# Patient Record
Sex: MALE | Race: WHITE | NOT HISPANIC OR LATINO | Employment: OTHER | URBAN - NONMETROPOLITAN AREA
[De-identification: names, ages, dates, MRNs, and addresses within clinical notes are randomized per-mention and may not be internally consistent; named-entity substitution may affect disease eponyms.]

---

## 2017-01-25 ENCOUNTER — OFFICE VISIT (OUTPATIENT)
Dept: CARDIOLOGY | Facility: CLINIC | Age: 62
End: 2017-01-25

## 2017-01-25 VITALS
WEIGHT: 211.2 LBS | HEART RATE: 67 BPM | DIASTOLIC BLOOD PRESSURE: 76 MMHG | HEIGHT: 69 IN | BODY MASS INDEX: 31.28 KG/M2 | SYSTOLIC BLOOD PRESSURE: 115 MMHG | OXYGEN SATURATION: 97 %

## 2017-01-25 DIAGNOSIS — Z85.819 HISTORY OF THROAT CANCER: ICD-10-CM

## 2017-01-25 DIAGNOSIS — Z00.00 HEALTHCARE MAINTENANCE: ICD-10-CM

## 2017-01-25 DIAGNOSIS — I25.10 CORONARY ARTERY DISEASE INVOLVING NATIVE CORONARY ARTERY OF NATIVE HEART WITHOUT ANGINA PECTORIS: ICD-10-CM

## 2017-01-25 DIAGNOSIS — E78.5 DYSLIPIDEMIA: Chronic | ICD-10-CM

## 2017-01-25 DIAGNOSIS — F17.200 CURRENT EVERY DAY SMOKER: Chronic | ICD-10-CM

## 2017-01-25 DIAGNOSIS — I25.5 ISCHEMIC CARDIOMYOPATHY: Primary | Chronic | ICD-10-CM

## 2017-01-25 DIAGNOSIS — I10 ESSENTIAL HYPERTENSION: Chronic | ICD-10-CM

## 2017-01-25 DIAGNOSIS — R49.9 CHANGE IN VOICE: ICD-10-CM

## 2017-01-25 PROCEDURE — 99213 OFFICE O/P EST LOW 20 MIN: CPT | Performed by: NURSE PRACTITIONER

## 2017-01-25 NOTE — PROGRESS NOTES
Subjective   Darnell Abarca is a 61 y.o. male     Chief Complaint   Patient presents with   • Follow-up     presents as a follow up       HPI    Problem List:    1. CAD  1.1 CABG @ [facility] distant past  1.2 C 6/2015 - patient grafts with disease in the native vessels with medical management recommendation; EF 40%  2. Ischemic Cardiomyopathy   2.1 Echo 8/1/2014 - EF 50-55%; DD I; mild LVH; mild TR; trace NM; PA 30-35  3. CHF; class II-III symptoms   4. Dyslipidemia   5. Orthostatic hypotension  6. COPD  7. History of throat CA    Patient is a 61-year-old male who presents today for a follow-up with his girlfriend at his side.  He denies any chest pain, pressure, palpitations, fluttering, dizziness, presyncope, syncope, orthopnea, PND or edema. He says he only gets short of breath with activity at times.  He has not had a follow-up on his throat CA in some time.  He still smokes 1 1/2 PPD.      Current Outpatient Prescriptions   Medication Sig Dispense Refill   • albuterol (VENTOLIN HFA) 108 (90 BASE) MCG/ACT inhaler Inhale 1 puff every 4 (four) hours as needed (shortness of breath). 1 inhaler 6   • atorvastatin (LIPITOR) 80 MG tablet Take 1 tablet by mouth every night. 90 tablet 3   • clopidogrel (PLAVIX) 75 MG tablet Take 1 tablet by mouth daily. 90 tablet 3   • gabapentin (NEURONTIN) 100 MG capsule Take 1 capsule by mouth every night.     • lisinopril (PRINIVIL,ZESTRIL) 10 MG tablet Take 1 tablet by mouth daily. 90 tablet 3   • nitroglycerin (NITROSTAT) 0.4 MG SL tablet Place  under the tongue.     • omeprazole (PRILOSEC) 20 MG capsule Take 1 capsule by mouth daily.     • oxyCODONE-acetaminophen (PERCOCET)  MG per tablet Take  by mouth.       No current facility-administered medications for this visit.        ALLERGIES    Aspirin; Codeine; Ethanolamine; Nsaids; Other; Salicylates; Theophylline; and Triprolidine-pseudoephedrine    Past Medical History   Diagnosis Date   • CAD (coronary artery disease)    • COPD  (chronic obstructive pulmonary disease)    • Current every day smoker      1.5-2 PPD   • Dizziness    • Dyslipidemia    • Edema    • History of throat cancer    • Hyperlipidemia    • Hypertension    • Ischemic cardiomyopathy    • Lightheaded    • Myocardial infarction    • Orthostatic hypotension    • SOB (shortness of breath)    • Stroke        Social History     Social History   • Marital status:      Spouse name: N/A   • Number of children: N/A   • Years of education: N/A     Occupational History   • Not on file.     Social History Main Topics   • Smoking status: Current Every Day Smoker     Packs/day: 2.00   • Smokeless tobacco: Not on file      Comment: 1.5-2 PPD   • Alcohol use Yes      Comment: occasionally   • Drug use: No   • Sexual activity: Not on file     Other Topics Concern   • Not on file     Social History Narrative       Family History   Problem Relation Age of Onset   • Diabetes Mother    • Hypertension Mother    • Hypertension Father    • Hyperlipidemia Sister      Familial hypercholesterolemia   • Hypertension Sister    • Hyperlipidemia Brother      Familial hypercholesterolemia   • Heart attack Brother      Acute MI   • Stroke Brother      CVA   • Hypertension Brother    • Heart disease Brother      pacemaker placement   • Other Brother      Pacemaker placement       Review of Systems   Constitutional: Positive for fatigue.   HENT: Positive for voice change (2003 or 2004 throat CA surgery voice has never fully recovered. ). Negative for rhinorrhea and sneezing.    Eyes: Positive for visual disturbance (wears glasses ).   Respiratory: Positive for cough (recent cold; used white lightening to resolve it ) and shortness of breath (not too bad ). Negative for chest tightness.    Cardiovascular: Negative for chest pain and leg swelling (neurontin helped the swelling).   Gastrointestinal: Positive for nausea and vomiting.   Endocrine: Negative.    Genitourinary: Negative for difficulty  "urinating.   Musculoskeletal: Positive for arthralgias, back pain and neck pain.        Leg cramps some times   Skin: Negative.    Allergic/Immunologic: Negative.    Neurological: Negative for dizziness, syncope and light-headedness.   Hematological: Bruises/bleeds easily.   Psychiatric/Behavioral: The patient is nervous/anxious.        Objective   Visit Vitals   • /76 (BP Location: Left arm, Patient Position: Sitting)   • Pulse 67   • Ht 69\" (175.3 cm)   • Wt 211 lb 3.2 oz (95.8 kg)   • SpO2 97%   • BMI 31.19 kg/m2     Lab Results (most recent)     None        Physical Exam   Constitutional: He is oriented to person, place, and time. Vital signs are normal. He appears well-developed and well-nourished. He is active and cooperative.   HENT:   Head: Normocephalic.   Right Ear: Decreased hearing is noted.   Left Ear: Decreased hearing is noted.   Mouth/Throat: Abnormal dentition (missing teeth ).   Hoarse voice; post nasal drip.    Eyes: Lids are normal.   Wears glasses    Neck: Normal carotid pulses, no hepatojugular reflux and no JVD present. Carotid bruit is not present.   Cardiovascular: Normal rate, regular rhythm and normal heart sounds.    Pulses:       Radial pulses are 2+ on the right side, and 2+ on the left side.        Dorsalis pedis pulses are 2+ on the right side, and 2+ on the left side.        Posterior tibial pulses are 2+ on the right side, and 2+ on the left side.   No edema BLE.    Pulmonary/Chest: Effort normal. He has wheezes (expiratory ) in the right upper field, the right middle field, the right lower field, the left upper field, the left middle field and the left lower field.   Abdominal: Normal appearance.   Neurological: He is alert and oriented to person, place, and time.   Skin: Skin is warm, dry and intact. There is pallor (to yellowish color).   Psychiatric: He has a normal mood and affect. His speech is normal and behavior is normal. Judgment and thought content normal. Cognition " and memory are normal.       Procedure   Procedures         Assessment/Plan      Diagnosis Plan   1. Ischemic cardiomyopathy  TSH   2. Essential hypertension  Comprehensive Metabolic Panel    TSH   3. Dyslipidemia  Lipid Panel    TSH   4. Current every day smoker     5. Coronary artery disease involving native coronary artery of native heart without angina pectoris  TSH    CBC & Differential   6. History of throat cancer  Ambulatory Referral to ENT (Otolaryngology)   7. Change in voice  Ambulatory Referral to ENT (Otolaryngology)   8. Healthcare maintenance  Comprehensive Metabolic Panel    Lipid Panel    TSH    CBC & Differential       Return in about 3 months (around 4/25/2017).  Patient is stable from a cardiac standpoint.  He will continue his medication regimen.  He will be referred Dr. Narvaez.  He will get lipids, CMP, TSH and CBC.  He will follow-up in 3 mos or sooner if any changes.  He was encouraged on smoking cessation.

## 2017-01-25 NOTE — PATIENT INSTRUCTIONS
Heart Failure  Heart failure is a condition in which the heart has trouble pumping blood. This means your heart does not pump blood efficiently for your body to work well. In some cases of heart failure, fluid may back up into your lungs or you may have swelling (edema) in your lower legs. Heart failure is usually a long-term (chronic) condition. It is important for you to take good care of yourself and follow your health care provider's treatment plan.  CAUSES   Some health conditions can cause heart failure. Those health conditions include:  · High blood pressure (hypertension). Hypertension causes the heart muscle to work harder than normal. When pressure in the blood vessels is high, the heart needs to pump (contract) with more force in order to circulate blood throughout the body. High blood pressure eventually causes the heart to become stiff and weak.  · Coronary artery disease (CAD). CAD is the buildup of cholesterol and fat (plaque) in the arteries of the heart. The blockage in the arteries deprives the heart muscle of oxygen and blood. This can cause chest pain and may lead to a heart attack. High blood pressure can also contribute to CAD.  · Heart attack (myocardial infarction). A heart attack occurs when one or more arteries in the heart become blocked. The loss of oxygen damages the muscle tissue of the heart. When this happens, part of the heart muscle dies. The injured tissue does not contract as well and weakens the heart's ability to pump blood.  · Abnormal heart valves. When the heart valves do not open and close properly, it can cause heart failure. This makes the heart muscle pump harder to keep the blood flowing.  · Heart muscle disease (cardiomyopathy or myocarditis). Heart muscle disease is damage to the heart muscle from a variety of causes. These can include drug or alcohol abuse, infections, or unknown reasons. These can increase the risk of heart failure.  · Lung disease. Lung disease  makes the heart work harder because the lungs do not work properly. This can cause a strain on the heart, leading it to fail.  · Diabetes. Diabetes increases the risk of heart failure. High blood sugar contributes to high fat (lipid) levels in the blood. Diabetes can also cause slow damage to tiny blood vessels that carry important nutrients to the heart muscle. When the heart does not get enough oxygen and food, it can cause the heart to become weak and stiff. This leads to a heart that does not contract efficiently.  · Other conditions can contribute to heart failure. These include abnormal heart rhythms, thyroid problems, and low blood counts (anemia).  Certain unhealthy behaviors can increase the risk of heart failure, including:  · Being overweight.  · Smoking or chewing tobacco.  · Eating foods high in fat and cholesterol.  · Abusing illicit drugs or alcohol.  · Lacking physical activity.  SYMPTOMS   Heart failure symptoms may vary and can be hard to detect. Symptoms may include:  · Shortness of breath with activity, such as climbing stairs.  · Persistent cough.  · Swelling of the feet, ankles, legs, or abdomen.  · Unexplained weight gain.  · Difficulty breathing when lying flat (orthopnea).  · Waking from sleep because of the need to sit up and get more air.  · Rapid heartbeat.  · Fatigue and loss of energy.  · Feeling light-headed, dizzy, or close to fainting.  · Loss of appetite.  · Nausea.  · Increased urination during the night (nocturia).  DIAGNOSIS   A diagnosis of heart failure is based on your history, symptoms, physical examination, and diagnostic tests. Diagnostic tests for heart failure may include:  · Echocardiography.  · Electrocardiography.  · Chest X-ray.  · Blood tests.  · Exercise stress test.  · Cardiac angiography.  · Radionuclide scans.  TREATMENT   Treatment is aimed at managing the symptoms of heart failure. Medicines, behavioral changes, or surgical intervention may be necessary to  treat heart failure.  · Medicines to help treat heart failure may include:    Angiotensin-converting enzyme (ACE) inhibitors. This type of medicine blocks the effects of a blood protein called angiotensin-converting enzyme. ACE inhibitors relax (dilate) the blood vessels and help lower blood pressure.    Angiotensin receptor blockers (ARBs). This type of medicine blocks the actions of a blood protein called angiotensin. Angiotensin receptor blockers dilate the blood vessels and help lower blood pressure.    Water pills (diuretics). Diuretics cause the kidneys to remove salt and water from the blood. The extra fluid is removed through urination. This loss of extra fluid lowers the volume of blood the heart pumps.    Beta blockers. These prevent the heart from beating too fast and improve heart muscle strength.    Digitalis. This increases the force of the heartbeat.  · Healthy behavior changes include:    Obtaining and maintaining a healthy weight.    Stopping smoking or chewing tobacco.    Eating heart-healthy foods.    Limiting or avoiding alcohol.    Stopping illicit drug use.    Physical activity as directed by your health care provider.  · Surgical treatment for heart failure may include:    A procedure to open blocked arteries, repair damaged heart valves, or remove damaged heart muscle tissue.    A pacemaker to improve heart muscle function and control certain abnormal heart rhythms.    An internal cardioverter defibrillator to treat certain serious abnormal heart rhythms.    A left ventricular assist device (LVAD) to assist the pumping ability of the heart.  HOME CARE INSTRUCTIONS   · Take medicines only as directed by your health care provider. Medicines are important in reducing the workload of your heart, slowing the progression of heart failure, and improving your symptoms.    Do not stop taking your medicine unless directed by your health care provider.    Do not skip any dose of medicine.    Refill your  prescriptions before you run out of medicine. Your medicines are needed every day.  · Engage in moderate physical activity if directed by your health care provider. Moderate physical activity can benefit some people. The elderly and people with severe heart failure should consult with a health care provider for physical activity recommendations.  · Eat heart-healthy foods. Food choices should be free of trans fat and low in saturated fat, cholesterol, and salt (sodium). Healthy choices include fresh or frozen fruits and vegetables, fish, lean meats, legumes, fat-free or low-fat dairy products, and whole grain or high fiber foods. Talk to a dietitian to learn more about heart-healthy foods.  · Limit sodium if directed by your health care provider. Sodium restriction may reduce symptoms of heart failure in some people. Talk to a dietitian to learn more about heart-healthy seasonings.  · Use healthy cooking methods. Healthy cooking methods include roasting, grilling, broiling, baking, poaching, steaming, or stir-frying. Talk to a dietitian to learn more about healthy cooking methods.  · Limit fluids if directed by your health care provider. Fluid restriction may reduce symptoms of heart failure in some people.  · Weigh yourself every day. Daily weights are important in the early recognition of excess fluid. You should weigh yourself every morning after you urinate and before you eat breakfast. Wear the same amount of clothing each time you weigh yourself. Record your daily weight. Provide your health care provider with your weight record.  · Monitor and record your blood pressure if directed by your health care provider.  · Check your pulse if directed by your health care provider.  · Lose weight if directed by your health care provider. Weight loss may reduce symptoms of heart failure in some people.  · Stop smoking or chewing tobacco. Nicotine makes your heart work harder by causing your blood vessels to constrict.  Do not use nicotine gum or patches before talking to your health care provider.  · Keep all follow-up visits as directed by your health care provider. This is important.  · Limit alcohol intake to no more than 1 drink per day for nonpregnant women and 2 drinks per day for men. One drink equals 12 ounces of beer, 5 ounces of wine, or 1½ ounces of hard liquor. Drinking more than that is harmful to your heart. Tell your health care provider if you drink alcohol several times a week. Talk with your health care provider about whether alcohol is safe for you. If your heart has already been damaged by alcohol or you have severe heart failure, drinking alcohol should be stopped completely.  · Stop illicit drug use.  · Stay up-to-date with immunizations. It is especially important to prevent respiratory infections through current pneumococcal and influenza immunizations.  · Manage other health conditions such as hypertension, diabetes, thyroid disease, or abnormal heart rhythms as directed by your health care provider.  · Learn to manage stress.  · Plan rest periods when fatigued.  · Learn strategies to manage high temperatures. If the weather is extremely hot:    Avoid vigorous physical activity.    Use air conditioning or fans or seek a cooler location.    Avoid caffeine and alcohol.    Wear loose-fitting, lightweight, and light-colored clothing.  · Learn strategies to manage cold temperatures. If the weather is extremely cold:    Avoid vigorous physical activity.    Layer clothes.    Wear mittens or gloves, a hat, and a scarf when going outside.    Avoid alcohol.  · Obtain ongoing education and support as needed.  · Participate in or seek rehabilitation as needed to maintain or improve independence and quality of life.  SEEK MEDICAL CARE IF:   · You have a rapid weight gain.  · You have increasing shortness of breath that is unusual for you.  · You are unable to participate in your usual physical activities.  · You tire  easily.  · You cough more than normal, especially with physical activity.  · You have any or more swelling in areas such as your hands, feet, ankles, or abdomen.  · You are unable to sleep because it is hard to breathe.  · You feel like your heart is beating fast (palpitations).  · You become dizzy or light-headed upon standing up.  SEEK IMMEDIATE MEDICAL CARE IF:   · You have difficulty breathing.  · There is a change in mental status such as decreased alertness or difficulty with concentration.  · You have a pain or discomfort in your chest.  · You have an episode of fainting (syncope).  MAKE SURE YOU:   · Understand these instructions.  · Will watch your condition.  · Will get help right away if you are not doing well or get worse.     This information is not intended to replace advice given to you by your health care provider. Make sure you discuss any questions you have with your health care provider.     Document Released: 12/18/2006 Document Revised: 05/03/2016 Document Reviewed: 01/17/2014  [employer] Interactive Patient Education ©2016 [employer]  Coronary Artery Disease, Male  Coronary artery disease (CAD) is a process in which the blood vessels of the heart (coronary arteries) become narrow or blocked. The narrowing or blockage can lead to decreased blood flow to the heart muscle (angina). Prolonged reduced blood flow can cause a heart attack (myocardial infarction, MI). Because CAD is the leading cause of death in men, it is important to understand what causes this condition and how it is treated.  CAUSES  Atherosclerosis is the cause of CAD. This is the buildup of fat and cholesterol (plaque) on the inside of the arteries. Over time, the plaque may narrow or block the artery, and this will lessen blood flow to the heart. Plaque can also become weak and break off within a coronary artery to form a clot and cause a sudden blockage.  RISK FACTORS  Many risk factors increase your chances of getting CAD,  including:  · High cholesterol levels.  · High blood pressure (hypertension).  · Tobacco use.  · Diabetes.  · Age. Men over age 45 are at a greater risk of CAD.  · Gender. Men often develop CAD earlier in life than women.  · Family history of CAD.  · Obesity.  · Lack of exercise.  · A diet high in saturated fats.  SYMPTOMS   Many people do not experience any symptoms during the early stages of CAD. As the condition progresses, symptoms may include:   · Chest pain.  ¨ The pain can be described as a crushing or squeezing in the chest, or a tightness, pressure, fullness, or heaviness in the chest.  ¨ The pain can last more than a few minutes or can stop and recur.   · Pain in the arms, neck, jaw, or back.  · Unexplained heartburn or indigestion.  · Shortness of breath.  · Nausea.  · Sudden cold sweats.   Less common symptoms of CAD in men can include:  · Fatigue.  · Unexplained feelings of nervousness or anxiety.  · Weakness.  · Diarrhea.  · Sudden light-headedness.  DIAGNOSIS   Tests to diagnose CAD may include:  · ECG (electrocardiogram).  · Exercise stress test. This looks for signs of blockage when the heart is being exercised.  · Pharmacologic stress test. This test looks for signs of blockage when the heart is being stressed with a medicine.  · Blood tests.  · Coronary angiogram. This is a procedure to look at the coronary arteries to see if there is any blockage.  TREATMENT  The treatment of CAD may include the following:  · Healthy behavioral changes to reduce or control risk factors.  · Medicine.  · Coronary stenting. A stent helps to keep an artery open.  · Coronary angioplasty. This procedure widens a narrowed or blocked artery.  · Coronary artery bypass surgery. This will allow your blood to pass the blockage (bypass) to reach your heart.  HOME CARE INSTRUCTIONS  · Take medicines only as directed by your health care provider.  · Do not take the following medicines unless your health care provider  approves:    Nonsteroidal anti-inflammatory drugs (NSAIDs), such as ibuprofen, naproxen, or celecoxib.    Vitamin supplements that contain vitamin A, vitamin E, or both.  · Manage other health conditions such as hypertension and diabetes as directed by your health care provider.  · Follow a heart-healthy diet. A dietitian can help to educate you about healthy food options and changes.  · Use healthy cooking methods such as roasting, grilling, broiling, baking, poaching, steaming, or stir-frying. Talk to a dietitian to learn more about healthy cooking methods.  · Follow an exercise program approved by your health care provider.  · Maintain a healthy weight. Lose weight as approved by your health care provider.  · Plan rest periods when fatigued.  · Learn to manage stress.  · Do not use any tobacco products, including cigarettes, chewing tobacco, or electronic cigarettes. If you need help quitting, ask your health care provider.  · If you drink alcohol, and your health care provider approves, limit your alcohol intake to no more than 1 drink per day. One drink equals 12 ounces of beer, 5 ounces of wine, or 1½ ounces of hard liquor.  · Stop illegal drug use.  · Your health care provider may ask you to monitor your blood pressure. A blood pressure reading consists of a higher number over a lower number, such as 110 over 72, which is written as 110/72. Ideally, your blood pressure should be:    Below 140/90 if you have no other medical conditions.    Below 130/80 if you have diabetes or kidney disease.  · Keep all follow-up visits as directed by your health care provider. This is important.  SEEK IMMEDIATE MEDICAL CARE IF:  · You have pain in your chest, neck, arm, jaw, stomach, or back that lasts more than a few minutes, is recurring, or is unrelieved by taking medicine under your tongue (sublingual nitroglycerin).  · You have profuse sweating without cause.  · You have unexplained:    Heartburn or indigestion.    " Shortness of breath or difficulty breathing.    Nausea or vomiting.    Fatigue.    Feelings of nervousness or anxiety.    Weakness.    Diarrhea.  · You have sudden light-headedness or dizziness.  · You faint.  These symptoms may represent a serious problem that is an emergency. Do not wait to see if the symptoms will go away. Get medical help right away. Call your local emergency services (911 in the [place] Do not drive yourself to the hospital.     This information is not intended to replace advice given to you by your health care provider. Make sure you discuss any questions you have with your health care provider.     Document Released: 07/15/2015 Document Reviewed: 07/15/2015  [employer] Interactive Patient Education ©2016 [employer]  You Can Quit Smoking  If you are ready to quit smoking or are thinking about it, congratulations! You have chosen to help yourself be healthier and live longer! There are lots of different ways to quit smoking. Nicotine gum, nicotine patches, a nicotine inhaler, or nicotine nasal spray can help with physical craving. Hypnosis, support groups, and medicines help break the habit of smoking.  TIPS TO GET OFF AND STAY OFF CIGARETTES  · Learn to predict your moods. Do not let a bad situation be your excuse to have a cigarette. Some situations in your life might tempt you to have a cigarette.  · Ask friends and co-workers not to smoke around you.  · Make your home smoke-free.  · Never have \"just one\" cigarette. It leads to wanting another and another. Remind yourself of your decision to quit.  · On a card, make a list of your reasons for not smoking. Read it at least the same number of times a day as you have a cigarette. Tell yourself everyday, \"I do not want to smoke. I choose not to smoke.\"  · Ask someone at home or work to help you with your plan to quit smoking.  · Have something planned after you eat or have a cup of coffee. Take a walk or get other exercise to perk you up. This " "will help to keep you from overeating.  · Try a relaxation exercise to calm you down and decrease your stress. Remember, you may be tense and nervous the first two weeks after you quit. This will pass.  · Find new activities to keep your hands busy. Play with a pen, coin, or rubber band. Doodle or draw things on paper.  · Brush your teeth right after eating. This will help cut down the craving for the taste of tobacco after meals. You can try mouthwash too.  · Try gum, breath mints, or diet candy to keep something in your mouth.  IF YOU SMOKE AND WANT TO QUIT:  · Do not stock up on cigarettes. Never buy a carton. Wait until one pack is finished before you buy another.  · Never carry cigarettes with you at work or at home.  · Keep cigarettes as far away from you as possible. Leave them with someone else.  · Never carry matches or a lighter with you.  · Ask yourself, \"Do I need this cigarette or is this just a reflex?\"  · Bet with someone that you can quit. Put cigarette money in a [employer] bank every morning. If you smoke, you give up the money. If you do not smoke, by the end of the week, you keep the money.  · Keep trying. It takes 21 days to change a habit!  · Talk to your doctor about using medicines to help you quit. These include nicotine replacement gum, lozenges, or skin patches.     This information is not intended to replace advice given to you by your health care provider. Make sure you discuss any questions you have with your health care provider.     Document Released: 10/14/2010 Document Revised: 03/11/2013 Document Reviewed: 10/14/2010  [employer] Interactive Patient Education ©2016 [employer]    "

## 2017-01-25 NOTE — MR AVS SNAPSHOT
Darnell Abarca   1/25/2017 2:15 PM   Office Visit    Dept Phone:  [phone]   Encounter #:  [id number]    Provider:  EARLINE Armenta   Department:  [facility] CARDIOLOGY                Your Full Care Plan              Your Updated Medication List          This list is accurate as of: 1/25/17  3:08 PM.  Always use your most recent med list.                albuterol 108 (90 BASE) MCG/ACT inhaler   Commonly known as:  VENTOLIN HFA   Inhale 1 puff every 4 (four) hours as needed (shortness of breath).       atorvastatin 80 MG tablet   Commonly known as:  LIPITOR   Take 1 tablet by mouth every night.       clopidogrel 75 MG tablet   Commonly known as:  PLAVIX   Take 1 tablet by mouth daily.       gabapentin 100 MG capsule   Commonly known as:  NEURONTIN       lisinopril 10 MG tablet   Commonly known as:  PRINIVIL,ZESTRIL   Take 1 tablet by mouth daily.       NITROSTAT 0.4 MG SL tablet   Generic drug:  nitroglycerin       PERCOCET  MG per tablet   Generic drug:  oxyCODONE-acetaminophen       PRILOSEC 20 MG capsule   Generic drug:  omeprazole               We Performed the Following     Ambulatory Referral to ENT (Otolaryngology)       You Were Diagnosed With        Codes Comments    Ischemic cardiomyopathy    -  Primary ICD-10-CM: I25.5  ICD-9-CM: 414.8     Essential hypertension     ICD-10-CM: I10  ICD-9-CM: 401.9     Dyslipidemia     ICD-10-CM: E78.5  ICD-9-CM: 272.4     Current every day smoker     ICD-10-CM: F17.200  ICD-9-CM: 305.1     Coronary artery disease involving native coronary artery of native heart without angina pectoris     ICD-10-CM: I25.10  ICD-9-CM: 414.01     History of throat cancer     ICD-10-CM: Z85.819  ICD-9-CM: V10.02     Change in voice     ICD-10-CM: R49.9  ICD-9-CM: 784.49     Healthcare maintenance     ICD-10-CM: Z00.00  ICD-9-CM: V70.0       Instructions    Heart Failure  Heart failure is a condition in which the heart has trouble  pumping blood. This means your heart does not pump blood efficiently for your body to work well. In some cases of heart failure, fluid may back up into your lungs or you may have swelling (edema) in your lower legs. Heart failure is usually a long-term (chronic) condition. It is important for you to take good care of yourself and follow your health care provider's treatment plan.  CAUSES   Some health conditions can cause heart failure. Those health conditions include:  · High blood pressure (hypertension). Hypertension causes the heart muscle to work harder than normal. When pressure in the blood vessels is high, the heart needs to pump (contract) with more force in order to circulate blood throughout the body. High blood pressure eventually causes the heart to become stiff and weak.  · Coronary artery disease (CAD). CAD is the buildup of cholesterol and fat (plaque) in the arteries of the heart. The blockage in the arteries deprives the heart muscle of oxygen and blood. This can cause chest pain and may lead to a heart attack. High blood pressure can also contribute to CAD.  · Heart attack (myocardial infarction). A heart attack occurs when one or more arteries in the heart become blocked. The loss of oxygen damages the muscle tissue of the heart. When this happens, part of the heart muscle dies. The injured tissue does not contract as well and weakens the heart's ability to pump blood.  · Abnormal heart valves. When the heart valves do not open and close properly, it can cause heart failure. This makes the heart muscle pump harder to keep the blood flowing.  · Heart muscle disease (cardiomyopathy or myocarditis). Heart muscle disease is damage to the heart muscle from a variety of causes. These can include drug or alcohol abuse, infections, or unknown reasons. These can increase the risk of heart failure.  · Lung disease. Lung disease makes the heart work harder because the lungs do not work properly. This can  cause a strain on the heart, leading it to fail.  · Diabetes. Diabetes increases the risk of heart failure. High blood sugar contributes to high fat (lipid) levels in the blood. Diabetes can also cause slow damage to tiny blood vessels that carry important nutrients to the heart muscle. When the heart does not get enough oxygen and food, it can cause the heart to become weak and stiff. This leads to a heart that does not contract efficiently.  · Other conditions can contribute to heart failure. These include abnormal heart rhythms, thyroid problems, and low blood counts (anemia).  Certain unhealthy behaviors can increase the risk of heart failure, including:  · Being overweight.  · Smoking or chewing tobacco.  · Eating foods high in fat and cholesterol.  · Abusing illicit drugs or alcohol.  · Lacking physical activity.  SYMPTOMS   Heart failure symptoms may vary and can be hard to detect. Symptoms may include:  · Shortness of breath with activity, such as climbing stairs.  · Persistent cough.  · Swelling of the feet, ankles, legs, or abdomen.  · Unexplained weight gain.  · Difficulty breathing when lying flat (orthopnea).  · Waking from sleep because of the need to sit up and get more air.  · Rapid heartbeat.  · Fatigue and loss of energy.  · Feeling light-headed, dizzy, or close to fainting.  · Loss of appetite.  · Nausea.  · Increased urination during the night (nocturia).  DIAGNOSIS   A diagnosis of heart failure is based on your history, symptoms, physical examination, and diagnostic tests. Diagnostic tests for heart failure may include:  · Echocardiography.  · Electrocardiography.  · Chest X-ray.  · Blood tests.  · Exercise stress test.  · Cardiac angiography.  · Radionuclide scans.  TREATMENT   Treatment is aimed at managing the symptoms of heart failure. Medicines, behavioral changes, or surgical intervention may be necessary to treat heart failure.  · Medicines to help treat heart failure may include:     Angiotensin-converting enzyme (ACE) inhibitors. This type of medicine blocks the effects of a blood protein called angiotensin-converting enzyme. ACE inhibitors relax (dilate) the blood vessels and help lower blood pressure.    Angiotensin receptor blockers (ARBs). This type of medicine blocks the actions of a blood protein called angiotensin. Angiotensin receptor blockers dilate the blood vessels and help lower blood pressure.    Water pills (diuretics). Diuretics cause the kidneys to remove salt and water from the blood. The extra fluid is removed through urination. This loss of extra fluid lowers the volume of blood the heart pumps.    Beta blockers. These prevent the heart from beating too fast and improve heart muscle strength.    Digitalis. This increases the force of the heartbeat.  · Healthy behavior changes include:    Obtaining and maintaining a healthy weight.    Stopping smoking or chewing tobacco.    Eating heart-healthy foods.    Limiting or avoiding alcohol.    Stopping illicit drug use.    Physical activity as directed by your health care provider.  · Surgical treatment for heart failure may include:    A procedure to open blocked arteries, repair damaged heart valves, or remove damaged heart muscle tissue.    A pacemaker to improve heart muscle function and control certain abnormal heart rhythms.    An internal cardioverter defibrillator to treat certain serious abnormal heart rhythms.    A left ventricular assist device (LVAD) to assist the pumping ability of the heart.  HOME CARE INSTRUCTIONS   · Take medicines only as directed by your health care provider. Medicines are important in reducing the workload of your heart, slowing the progression of heart failure, and improving your symptoms.    Do not stop taking your medicine unless directed by your health care provider.    Do not skip any dose of medicine.    Refill your prescriptions before you run out of medicine. Your medicines are needed  every day.  · Engage in moderate physical activity if directed by your health care provider. Moderate physical activity can benefit some people. The elderly and people with severe heart failure should consult with a health care provider for physical activity recommendations.  · Eat heart-healthy foods. Food choices should be free of trans fat and low in saturated fat, cholesterol, and salt (sodium). Healthy choices include fresh or frozen fruits and vegetables, fish, lean meats, legumes, fat-free or low-fat dairy products, and whole grain or high fiber foods. Talk to a dietitian to learn more about heart-healthy foods.  · Limit sodium if directed by your health care provider. Sodium restriction may reduce symptoms of heart failure in some people. Talk to a dietitian to learn more about heart-healthy seasonings.  · Use healthy cooking methods. Healthy cooking methods include roasting, grilling, broiling, baking, poaching, steaming, or stir-frying. Talk to a dietitian to learn more about healthy cooking methods.  · Limit fluids if directed by your health care provider. Fluid restriction may reduce symptoms of heart failure in some people.  · Weigh yourself every day. Daily weights are important in the early recognition of excess fluid. You should weigh yourself every morning after you urinate and before you eat breakfast. Wear the same amount of clothing each time you weigh yourself. Record your daily weight. Provide your health care provider with your weight record.  · Monitor and record your blood pressure if directed by your health care provider.  · Check your pulse if directed by your health care provider.  · Lose weight if directed by your health care provider. Weight loss may reduce symptoms of heart failure in some people.  · Stop smoking or chewing tobacco. Nicotine makes your heart work harder by causing your blood vessels to constrict. Do not use nicotine gum or patches before talking to your health care  provider.  · Keep all follow-up visits as directed by your health care provider. This is important.  · Limit alcohol intake to no more than 1 drink per day for nonpregnant women and 2 drinks per day for men. One drink equals 12 ounces of beer, 5 ounces of wine, or 1½ ounces of hard liquor. Drinking more than that is harmful to your heart. Tell your health care provider if you drink alcohol several times a week. Talk with your health care provider about whether alcohol is safe for you. If your heart has already been damaged by alcohol or you have severe heart failure, drinking alcohol should be stopped completely.  · Stop illicit drug use.  · Stay up-to-date with immunizations. It is especially important to prevent respiratory infections through current pneumococcal and influenza immunizations.  · Manage other health conditions such as hypertension, diabetes, thyroid disease, or abnormal heart rhythms as directed by your health care provider.  · Learn to manage stress.  · Plan rest periods when fatigued.  · Learn strategies to manage high temperatures. If the weather is extremely hot:    Avoid vigorous physical activity.    Use air conditioning or fans or seek a cooler location.    Avoid caffeine and alcohol.    Wear loose-fitting, lightweight, and light-colored clothing.  · Learn strategies to manage cold temperatures. If the weather is extremely cold:    Avoid vigorous physical activity.    Layer clothes.    Wear mittens or gloves, a hat, and a scarf when going outside.    Avoid alcohol.  · Obtain ongoing education and support as needed.  · Participate in or seek rehabilitation as needed to maintain or improve independence and quality of life.  SEEK MEDICAL CARE IF:   · You have a rapid weight gain.  · You have increasing shortness of breath that is unusual for you.  · You are unable to participate in your usual physical activities.  · You tire easily.  · You cough more than normal, especially with physical  activity.  · You have any or more swelling in areas such as your hands, feet, ankles, or abdomen.  · You are unable to sleep because it is hard to breathe.  · You feel like your heart is beating fast (palpitations).  · You become dizzy or light-headed upon standing up.  SEEK IMMEDIATE MEDICAL CARE IF:   · You have difficulty breathing.  · There is a change in mental status such as decreased alertness or difficulty with concentration.  · You have a pain or discomfort in your chest.  · You have an episode of fainting (syncope).  MAKE SURE YOU:   · Understand these instructions.  · Will watch your condition.  · Will get help right away if you are not doing well or get worse.     This information is not intended to replace advice given to you by your health care provider. Make sure you discuss any questions you have with your health care provider.     Document Released: 12/18/2006 Document Revised: 05/03/2016 Document Reviewed: 01/17/2014  [employer] Interactive Patient Education ©2016 [employer] Inc.  Coronary Artery Disease, Male  Coronary artery disease (CAD) is a process in which the blood vessels of the heart (coronary arteries) become narrow or blocked. The narrowing or blockage can lead to decreased blood flow to the heart muscle (angina). Prolonged reduced blood flow can cause a heart attack (myocardial infarction, MI). Because CAD is the leading cause of death in men, it is important to understand what causes this condition and how it is treated.  CAUSES  Atherosclerosis is the cause of CAD. This is the buildup of fat and cholesterol (plaque) on the inside of the arteries. Over time, the plaque may narrow or block the artery, and this will lessen blood flow to the heart. Plaque can also become weak and break off within a coronary artery to form a clot and cause a sudden blockage.  RISK FACTORS  Many risk factors increase your chances of getting CAD, including:  · High cholesterol levels.  · High blood pressure  (hypertension).  · Tobacco use.  · Diabetes.  · Age. Men over age 45 are at a greater risk of CAD.  · Gender. Men often develop CAD earlier in life than women.  · Family history of CAD.  · Obesity.  · Lack of exercise.  · A diet high in saturated fats.  SYMPTOMS   Many people do not experience any symptoms during the early stages of CAD. As the condition progresses, symptoms may include:   · Chest pain.  ¨ The pain can be described as a crushing or squeezing in the chest, or a tightness, pressure, fullness, or heaviness in the chest.  ¨ The pain can last more than a few minutes or can stop and recur.   · Pain in the arms, neck, jaw, or back.  · Unexplained heartburn or indigestion.  · Shortness of breath.  · Nausea.  · Sudden cold sweats.   Less common symptoms of CAD in men can include:  · Fatigue.  · Unexplained feelings of nervousness or anxiety.  · Weakness.  · Diarrhea.  · Sudden light-headedness.  DIAGNOSIS   Tests to diagnose CAD may include:  · ECG (electrocardiogram).  · Exercise stress test. This looks for signs of blockage when the heart is being exercised.  · Pharmacologic stress test. This test looks for signs of blockage when the heart is being stressed with a medicine.  · Blood tests.  · Coronary angiogram. This is a procedure to look at the coronary arteries to see if there is any blockage.  TREATMENT  The treatment of CAD may include the following:  · Healthy behavioral changes to reduce or control risk factors.  · Medicine.  · Coronary stenting. A stent helps to keep an artery open.  · Coronary angioplasty. This procedure widens a narrowed or blocked artery.  · Coronary artery bypass surgery. This will allow your blood to pass the blockage (bypass) to reach your heart.  HOME CARE INSTRUCTIONS  · Take medicines only as directed by your health care provider.  · Do not take the following medicines unless your health care provider approves:    Nonsteroidal anti-inflammatory drugs (NSAIDs), such as  ibuprofen, naproxen, or celecoxib.    Vitamin supplements that contain vitamin A, vitamin E, or both.  · Manage other health conditions such as hypertension and diabetes as directed by your health care provider.  · Follow a heart-healthy diet. A dietitian can help to educate you about healthy food options and changes.  · Use healthy cooking methods such as roasting, grilling, broiling, baking, poaching, steaming, or stir-frying. Talk to a dietitian to learn more about healthy cooking methods.  · Follow an exercise program approved by your health care provider.  · Maintain a healthy weight. Lose weight as approved by your health care provider.  · Plan rest periods when fatigued.  · Learn to manage stress.  · Do not use any tobacco products, including cigarettes, chewing tobacco, or electronic cigarettes. If you need help quitting, ask your health care provider.  · If you drink alcohol, and your health care provider approves, limit your alcohol intake to no more than 1 drink per day. One drink equals 12 ounces of beer, 5 ounces of wine, or 1½ ounces of hard liquor.  · Stop illegal drug use.  · Your health care provider may ask you to monitor your blood pressure. A blood pressure reading consists of a higher number over a lower number, such as 110 over 72, which is written as 110/72. Ideally, your blood pressure should be:    Below 140/90 if you have no other medical conditions.    Below 130/80 if you have diabetes or kidney disease.  · Keep all follow-up visits as directed by your health care provider. This is important.  SEEK IMMEDIATE MEDICAL CARE IF:  · You have pain in your chest, neck, arm, jaw, stomach, or back that lasts more than a few minutes, is recurring, or is unrelieved by taking medicine under your tongue (sublingual nitroglycerin).  · You have profuse sweating without cause.  · You have unexplained:    Heartburn or indigestion.    Shortness of breath or difficulty breathing.    Nausea or vomiting.    " Fatigue.    Feelings of nervousness or anxiety.    Weakness.    Diarrhea.  · You have sudden light-headedness or dizziness.  · You faint.  These symptoms may represent a serious problem that is an emergency. Do not wait to see if the symptoms will go away. Get medical help right away. Call your local emergency services (911 in the [place] Do not drive yourself to the hospital.     This information is not intended to replace advice given to you by your health care provider. Make sure you discuss any questions you have with your health care provider.     Document Released: 07/15/2015 Document Reviewed: 07/15/2015  [employer] Interactive Patient Education ©2016 Elsevier Inc.  You Can Quit Smoking  If you are ready to quit smoking or are thinking about it, congratulations! You have chosen to help yourself be healthier and live longer! There are lots of different ways to quit smoking. Nicotine gum, nicotine patches, a nicotine inhaler, or nicotine nasal spray can help with physical craving. Hypnosis, support groups, and medicines help break the habit of smoking.  TIPS TO GET OFF AND STAY OFF CIGARETTES  · Learn to predict your moods. Do not let a bad situation be your excuse to have a cigarette. Some situations in your life might tempt you to have a cigarette.  · Ask friends and co-workers not to smoke around you.  · Make your home smoke-free.  · Never have \"just one\" cigarette. It leads to wanting another and another. Remind yourself of your decision to quit.  · On a card, make a list of your reasons for not smoking. Read it at least the same number of times a day as you have a cigarette. Tell yourself everyday, \"I do not want to smoke. I choose not to smoke.\"  · Ask someone at home or work to help you with your plan to quit smoking.  · Have something planned after you eat or have a cup of coffee. Take a walk or get other exercise to perk you up. This will help to keep you from overeating.  · Try a relaxation exercise to " "calm you down and decrease your stress. Remember, you may be tense and nervous the first two weeks after you quit. This will pass.  · Find new activities to keep your hands busy. Play with a pen, coin, or rubber band. Doodle or draw things on paper.  · Brush your teeth right after eating. This will help cut down the craving for the taste of tobacco after meals. You can try mouthwash too.  · Try gum, breath mints, or diet candy to keep something in your mouth.  IF YOU SMOKE AND WANT TO QUIT:  · Do not stock up on cigarettes. Never buy a carton. Wait until one pack is finished before you buy another.  · Never carry cigarettes with you at work or at home.  · Keep cigarettes as far away from you as possible. Leave them with someone else.  · Never carry matches or a lighter with you.  · Ask yourself, \"Do I need this cigarette or is this just a reflex?\"  · Bet with someone that you can quit. Put cigarette money in a [employer] bank every morning. If you smoke, you give up the money. If you do not smoke, by the end of the week, you keep the money.  · Keep trying. It takes 21 days to change a habit!  · Talk to your doctor about using medicines to help you quit. These include nicotine replacement gum, lozenges, or skin patches.     This information is not intended to replace advice given to you by your health care provider. Make sure you discuss any questions you have with your health care provider.     Document Released: 10/14/2010 Document Revised: 03/11/2013 Document Reviewed: 10/14/2010  [employer] Interactive Patient Education ©2016 [employer]       Patient Instructions History      Upcoming Appointments     Visit Type Date Time Department    FOLLOW UP 1/25/2017  2:15 PM [facility] JOHN Martínez Signup     Our records indicate that you have declined [place] signup. If you would like to sign up for [facility], please email [email or web address] or call [phone] to obtain an activation " "code.             Other Info from Your Visit           Allergies     Aspirin Allergy     TABS    Codeine Allergy     Derivatives    Ethanolamine Allergy     Ethamolin SOLN    Nsaids Allergy     Other Allergy     Antihistamine TABS  Xanthine Derivatives    Salicylates Allergy     Theophylline      Triprolidine-pseudoephedrine        Reason for Visit     Follow-up presents as a follow up      Vital Signs     Blood Pressure Pulse Height Weight Oxygen Saturation Body Mass Index    115/76 (BP Location: Left arm, Patient Position: Sitting) 67 69\" (175.3 cm) 211 lb 3.2 oz (95.8 kg) 97% 31.19 kg/m2    Smoking Status                   Current Every Day Smoker           Problems and Diagnoses Noted     Current every day smoker    Dyslipidemia    High blood pressure    Ischemic cardiomyopathy    Coronary artery disease involving native coronary artery of native heart without angina pectoris        History of throat cancer        Change in voice        Routine medical exam            "

## 2017-04-20 ENCOUNTER — TELEPHONE (OUTPATIENT)
Dept: CARDIOLOGY | Facility: CLINIC | Age: 62
End: 2017-04-20

## 2017-04-20 DIAGNOSIS — R06.02 SHORTNESS OF BREATH: ICD-10-CM

## 2017-04-20 DIAGNOSIS — I42.9 CARDIOMYOPATHY (HCC): Primary | ICD-10-CM

## 2017-04-20 NOTE — TELEPHONE ENCOUNTER
Instructions reviewed with patient re: holding Lisinopril for 3 days and will recheck BMP in 1 week. Also scheduled for Echo.

## 2017-04-21 ENCOUNTER — TELEPHONE (OUTPATIENT)
Dept: CARDIOLOGY | Facility: CLINIC | Age: 62
End: 2017-04-21

## 2017-04-21 NOTE — TELEPHONE ENCOUNTER
Female Neighbor calling for patient wanting clarification on call yest. About labs. She is aware he is to hold Lisinopril x3 days then have BMP in 1 week. PH,LPN

## 2017-04-21 NOTE — TELEPHONE ENCOUNTER
----- Message from Angeli Otoole sent at 4/21/2017  8:29 AM EDT -----  Darnell's wife is returning a call. She said a nurse called them yesterday about getting labs done and they need clarification on that. Her number is [phone].

## 2017-04-25 ENCOUNTER — OFFICE VISIT (OUTPATIENT)
Dept: CARDIOLOGY | Facility: CLINIC | Age: 62
End: 2017-04-25

## 2017-04-25 VITALS
OXYGEN SATURATION: 97 % | WEIGHT: 207.6 LBS | SYSTOLIC BLOOD PRESSURE: 126 MMHG | DIASTOLIC BLOOD PRESSURE: 76 MMHG | HEART RATE: 52 BPM | BODY MASS INDEX: 30.75 KG/M2 | HEIGHT: 69 IN

## 2017-04-25 DIAGNOSIS — F17.200 CURRENT EVERY DAY SMOKER: Chronic | ICD-10-CM

## 2017-04-25 DIAGNOSIS — R42 DIZZINESS: ICD-10-CM

## 2017-04-25 DIAGNOSIS — I95.1 ORTHOSTATIC HYPOTENSION: Chronic | ICD-10-CM

## 2017-04-25 DIAGNOSIS — E78.5 DYSLIPIDEMIA: Chronic | ICD-10-CM

## 2017-04-25 DIAGNOSIS — J44.9 CHRONIC OBSTRUCTIVE PULMONARY DISEASE, UNSPECIFIED COPD TYPE (HCC): ICD-10-CM

## 2017-04-25 DIAGNOSIS — R00.1 BRADYCARDIA: ICD-10-CM

## 2017-04-25 DIAGNOSIS — Z00.00 HEALTHCARE MAINTENANCE: ICD-10-CM

## 2017-04-25 DIAGNOSIS — I10 ESSENTIAL HYPERTENSION: Chronic | ICD-10-CM

## 2017-04-25 DIAGNOSIS — I25.5 ISCHEMIC CARDIOMYOPATHY: Primary | Chronic | ICD-10-CM

## 2017-04-25 DIAGNOSIS — I25.810 CORONARY ARTERY DISEASE INVOLVING CORONARY BYPASS GRAFT OF NATIVE HEART WITHOUT ANGINA PECTORIS: ICD-10-CM

## 2017-04-25 PROCEDURE — 99214 OFFICE O/P EST MOD 30 MIN: CPT | Performed by: NURSE PRACTITIONER

## 2017-04-25 PROCEDURE — 93000 ELECTROCARDIOGRAM COMPLETE: CPT | Performed by: NURSE PRACTITIONER

## 2017-04-25 NOTE — PATIENT INSTRUCTIONS
"You Can Quit Smoking  If you are ready to quit smoking or are thinking about it, congratulations! You have chosen to help yourself be healthier and live longer! There are lots of different ways to quit smoking. Nicotine gum, nicotine patches, a nicotine inhaler, or nicotine nasal spray can help with physical craving. Hypnosis, support groups, and medicines help break the habit of smoking.  TIPS TO GET OFF AND STAY OFF CIGARETTES  · Learn to predict your moods. Do not let a bad situation be your excuse to have a cigarette. Some situations in your life might tempt you to have a cigarette.  · Ask friends and co-workers not to smoke around you.  · Make your home smoke-free.  · Never have \"just one\" cigarette. It leads to wanting another and another. Remind yourself of your decision to quit.  · On a card, make a list of your reasons for not smoking. Read it at least the same number of times a day as you have a cigarette. Tell yourself everyday, \"I do not want to smoke. I choose not to smoke.\"  · Ask someone at home or work to help you with your plan to quit smoking.  · Have something planned after you eat or have a cup of coffee. Take a walk or get other exercise to perk you up. This will help to keep you from overeating.  · Try a relaxation exercise to calm you down and decrease your stress. Remember, you may be tense and nervous the first two weeks after you quit. This will pass.  · Find new activities to keep your hands busy. Play with a pen, coin, or rubber band. Doodle or draw things on paper.  · Brush your teeth right after eating. This will help cut down the craving for the taste of tobacco after meals. You can try mouthwash too.  · Try gum, breath mints, or diet candy to keep something in your mouth.  IF YOU SMOKE AND WANT TO QUIT:  · Do not stock up on cigarettes. Never buy a carton. Wait until one pack is finished before you buy another.  · Never carry cigarettes with you at work or at home.  · Keep cigarettes " "as far away from you as possible. Leave them with someone else.  · Never carry matches or a lighter with you.  · Ask yourself, \"Do I need this cigarette or is this just a reflex?\"  · Bet with someone that you can quit. Put cigarette money in a [employer] bank every morning. If you smoke, you give up the money. If you do not smoke, by the end of the week, you keep the money.  · Keep trying. It takes 21 days to change a habit!  · Talk to your doctor about using medicines to help you quit. These include nicotine replacement gum, lozenges, or skin patches.     This information is not intended to replace advice given to you by your health care provider. Make sure you discuss any questions you have with your health care provider.     Document Released: 10/14/2010 Document Revised: 03/11/2013 Document Reviewed: 05/03/2016  Elsevier Interactive Patient Education ©2016 [employer]    "

## 2017-04-25 NOTE — PROGRESS NOTES
Subjective   Darnell Abarca is a 62 y.o. male     Chief Complaint   Patient presents with   • Follow-up     presnts as a follow up       HPI    Problem List:    1. CAD  1.1 CABG @ [facility] distant past  1.2 C 6/2015 - patient grafts with disease in the native vessels with medical management recommendation; EF 40%  2. Ischemic Cardiomyopathy   2.1 Echo 8/1/2014 - EF 50-55%; DD I; mild LVH; mild TR; trace UT; PA 30-35  3. CHF; class II-III symptoms   4. Dyslipidemia   5. Orthostatic hypotension  6. COPD  7. History of throat CA    Patient is a 62-year-old male who presents today for a follow-up with his girlfriend at his side.  He denies any chest pain, pressure, palpitations or fluttering.  He will get dizzy at times when he moves his head or looks up, but denies any presyncope or syncope.  He denies any orthopnea, PND or edema.  He will get short of breath when he does more than normal.  He says he still smokes 1 1/2 PPD.  Kamlesh called to schedule his echo on their way out the door so she will call to set it up when she gets home.  We went over labs.  He did see ENT and they advised no signs of CA per patient.     Current Outpatient Prescriptions   Medication Sig Dispense Refill   • albuterol (VENTOLIN HFA) 108 (90 BASE) MCG/ACT inhaler Inhale 1 puff every 4 (four) hours as needed (shortness of breath). 1 inhaler 6   • atorvastatin (LIPITOR) 80 MG tablet Take 1 tablet by mouth every night. 90 tablet 3   • clopidogrel (PLAVIX) 75 MG tablet Take 1 tablet by mouth daily. 90 tablet 3   • gabapentin (NEURONTIN) 100 MG capsule Take 1 capsule by mouth every night.     • lisinopril (PRINIVIL,ZESTRIL) 10 MG tablet Take 1 tablet by mouth daily. 90 tablet 3   • nitroglycerin (NITROSTAT) 0.4 MG SL tablet Place  under the tongue.     • omeprazole (PRILOSEC) 20 MG capsule Take 1 capsule by mouth daily.     • oxyCODONE-acetaminophen (PERCOCET)  MG per tablet Take  by mouth.       No current facility-administered medications for  this visit.        ALLERGIES    Aspirin; Codeine; Ethanolamine; Nsaids; Other; Salicylates; Theophylline; and Triprolidine-pseudoephedrine    Past Medical History:   Diagnosis Date   • CAD (coronary artery disease)    • COPD (chronic obstructive pulmonary disease)    • Current every day smoker     1.5-2 PPD   • Dizziness    • Dyslipidemia    • Edema    • History of throat cancer    • Hyperlipidemia    • Hypertension    • Ischemic cardiomyopathy    • Lightheaded    • Myocardial infarction    • Orthostatic hypotension    • SOB (shortness of breath)    • Stroke        Social History     Social History   • Marital status:      Spouse name: N/A   • Number of children: N/A   • Years of education: N/A     Occupational History   • Not on file.     Social History Main Topics   • Smoking status: Current Every Day Smoker     Packs/day: 2.00   • Smokeless tobacco: Not on file      Comment: 1.5-2 PPD   • Alcohol use Yes      Comment: occasionally   • Drug use: No   • Sexual activity: Not on file     Other Topics Concern   • Not on file     Social History Narrative       Family History   Problem Relation Age of Onset   • Diabetes Mother    • Hypertension Mother    • Hypertension Father    • Hyperlipidemia Sister      Familial hypercholesterolemia   • Hypertension Sister    • Hyperlipidemia Brother      Familial hypercholesterolemia   • Heart attack Brother      Acute MI   • Stroke Brother      CVA   • Hypertension Brother    • Heart disease Brother      pacemaker placement   • Other Brother      Pacemaker placement       Review of Systems   Constitutional: Negative for diaphoresis and fatigue.   HENT: Positive for hearing loss, rhinorrhea, sneezing and voice change.    Eyes: Positive for visual disturbance (wears glasses ).   Respiratory: Positive for shortness of breath (sometimes when do more than normal ). Negative for chest tightness.    Cardiovascular: Negative for chest pain, palpitations and leg swelling.  "  Gastrointestinal: Negative for nausea and vomiting.   Endocrine: Negative.    Genitourinary: Negative for difficulty urinating.   Musculoskeletal: Positive for arthralgias and back pain (low back ). Negative for neck pain.   Skin: Negative.    Allergic/Immunologic: Positive for environmental allergies.   Neurological: Positive for dizziness (when he bends over or when he looks up). Negative for syncope and light-headedness.   Hematological: Bruises/bleeds easily.   Psychiatric/Behavioral: The patient is nervous/anxious.        Objective   /76 (BP Location: Left arm, Patient Position: Sitting)  Pulse 52  Ht 69\" (175.3 cm)  Wt 207 lb 9.6 oz (94.2 kg)  SpO2 97%  BMI 30.66 kg/m2  Lab Results (most recent)     None        Physical Exam   Constitutional: He is oriented to person, place, and time. Vital signs are normal. He appears well-developed and well-nourished. He is active and cooperative.   HENT:   Head: Normocephalic.   Right Ear: Decreased hearing is noted.   Left Ear: Decreased hearing is noted.   Mouth/Throat: Abnormal dentition (edentulous ).   Hoarseness    Eyes: Lids are normal.   Wears glasses    Neck: Normal carotid pulses, no hepatojugular reflux and no JVD present. Carotid bruit is not present.   Cardiovascular: Regular rhythm and normal heart sounds.  Bradycardia present.    Pulses:       Radial pulses are 2+ on the right side, and 2+ on the left side.        Dorsalis pedis pulses are 2+ on the right side, and 2+ on the left side.        Posterior tibial pulses are 2+ on the right side, and 2+ on the left side.   No edema BLE.    Pulmonary/Chest: He has decreased breath sounds in the right lower field and the left lower field.   Abdominal: Normal appearance.   Neurological: He is alert and oriented to person, place, and time.   Skin: Skin is warm, dry and intact.   Psychiatric: He has a normal mood and affect. His speech is normal and behavior is normal. Judgment and thought content normal. " Cognition and memory are normal.       Procedure     ECG 12 Lead  Date/Time: 4/25/2017 1:43 PM  Performed by: NATIVIDAD TREADWELL  Authorized by: NATIVIDAD TREADWELL   Comparison: compared with previous ECG from 4/19/2015  Similar to previous ECG  Rhythm: sinus bradycardia  Rate: bradycardic  BPM: 49  ST segment flattening noted on lead: non specific changes   QRS axis: left  Clinical impression: non-specific ECG                 Assessment/Plan      Diagnosis Plan   1. Ischemic cardiomyopathy  ECG 12 Lead   2. Orthostatic hypotension     3. Essential hypertension  Basic Metabolic Panel    ECG 12 Lead    Basic Metabolic Panel   4. Chronic obstructive pulmonary disease, unspecified COPD type     5. Dyslipidemia     6. Current every day smoker     7. Healthcare maintenance  Basic Metabolic Panel    Basic Metabolic Panel   8. Dizziness  Cardiac Event Monitor    US Carotid Bilateral    Cardiac Event Monitor    US Carotid Bilateral   9. Bradycardia  Cardiac Event Monitor    Cardiac Event Monitor   10. Coronary artery disease involving coronary bypass graft of native heart without angina pectoris  ECG 12 Lead       Return in about 6 weeks (around 6/6/2017).       Patient will get echo, which was already ordered and carotid U/S.  He will wear an event monitor for 2 weeks due to bradycardia.  He will get a repeat BMP.  He will continue his medication regimen.  He will follow-up in 6 weeks or sooner if any changes. He was encouraged on smoking cessation.

## 2017-05-04 ENCOUNTER — TELEPHONE (OUTPATIENT)
Dept: CARDIOLOGY | Facility: CLINIC | Age: 62
End: 2017-05-04

## 2017-05-04 DIAGNOSIS — I10 ESSENTIAL HYPERTENSION: Primary | ICD-10-CM

## 2017-05-08 ENCOUNTER — HOSPITAL ENCOUNTER (OUTPATIENT)
Dept: CARDIOLOGY | Facility: HOSPITAL | Age: 62
Discharge: HOME | End: 2017-05-08

## 2017-05-08 ENCOUNTER — HOSPITAL ENCOUNTER (OUTPATIENT)
Dept: CARDIOLOGY | Facility: HOSPITAL | Age: 62
Discharge: HOME | End: 2017-05-08
Admitting: NURSE PRACTITIONER

## 2017-05-08 ENCOUNTER — OUTSIDE FACILITY SERVICE (OUTPATIENT)
Dept: CARDIOLOGY | Facility: CLINIC | Age: 62
End: 2017-05-08

## 2017-05-08 DIAGNOSIS — I42.9 CARDIOMYOPATHY (HCC): ICD-10-CM

## 2017-05-08 DIAGNOSIS — R06.02 SHORTNESS OF BREATH: ICD-10-CM

## 2017-05-08 PROCEDURE — 93880 EXTRACRANIAL BILAT STUDY: CPT | Performed by: INTERNAL MEDICINE

## 2017-05-08 PROCEDURE — 93306 TTE W/DOPPLER COMPLETE: CPT | Performed by: INTERNAL MEDICINE

## 2017-05-08 PROCEDURE — 93306 TTE W/DOPPLER COMPLETE: CPT

## 2017-05-08 PROCEDURE — 93880 EXTRACRANIAL BILAT STUDY: CPT

## 2017-05-14 ENCOUNTER — OUTSIDE FACILITY SERVICE (OUTPATIENT)
Dept: CARDIOLOGY | Facility: CLINIC | Age: 62
End: 2017-05-14

## 2017-05-14 PROCEDURE — 93228 REMOTE 30 DAY ECG REV/REPORT: CPT | Performed by: INTERNAL MEDICINE

## 2017-05-15 ENCOUNTER — DOCUMENTATION (OUTPATIENT)
Dept: CARDIOLOGY | Facility: CLINIC | Age: 62
End: 2017-05-15

## 2017-05-17 ENCOUNTER — TELEPHONE (OUTPATIENT)
Dept: CARDIOLOGY | Facility: CLINIC | Age: 62
End: 2017-05-17

## 2017-05-17 DIAGNOSIS — I10 ESSENTIAL HYPERTENSION: Primary | ICD-10-CM

## 2017-05-17 RX ORDER — AMLODIPINE BESYLATE 5 MG/1
5 TABLET ORAL DAILY
Qty: 30 TABLET | Refills: 6 | Status: SHIPPED | OUTPATIENT
Start: 2017-05-17 | End: 2017-06-06 | Stop reason: ALTCHOICE

## 2017-06-06 ENCOUNTER — OFFICE VISIT (OUTPATIENT)
Dept: CARDIOLOGY | Facility: CLINIC | Age: 62
End: 2017-06-06

## 2017-06-06 VITALS
HEIGHT: 69 IN | BODY MASS INDEX: 30.75 KG/M2 | WEIGHT: 207.6 LBS | SYSTOLIC BLOOD PRESSURE: 126 MMHG | HEART RATE: 50 BPM | DIASTOLIC BLOOD PRESSURE: 77 MMHG | OXYGEN SATURATION: 96 %

## 2017-06-06 DIAGNOSIS — I25.810 CORONARY ARTERY DISEASE INVOLVING CORONARY BYPASS GRAFT OF NATIVE HEART WITHOUT ANGINA PECTORIS: Primary | ICD-10-CM

## 2017-06-06 DIAGNOSIS — F17.200 CURRENT EVERY DAY SMOKER: Chronic | ICD-10-CM

## 2017-06-06 DIAGNOSIS — E78.5 HYPERLIPIDEMIA, UNSPECIFIED HYPERLIPIDEMIA TYPE: ICD-10-CM

## 2017-06-06 DIAGNOSIS — I10 ESSENTIAL HYPERTENSION: Chronic | ICD-10-CM

## 2017-06-06 DIAGNOSIS — I77.9 BILATERAL CAROTID ARTERY DISEASE (HCC): ICD-10-CM

## 2017-06-06 PROCEDURE — 99213 OFFICE O/P EST LOW 20 MIN: CPT | Performed by: NURSE PRACTITIONER

## 2017-06-06 RX ORDER — ATORVASTATIN CALCIUM 80 MG/1
80 TABLET, FILM COATED ORAL NIGHTLY
Qty: 30 TABLET | Refills: 11 | Status: SHIPPED | OUTPATIENT
Start: 2017-06-06 | End: 2018-03-06 | Stop reason: SDUPTHER

## 2017-06-06 NOTE — PROGRESS NOTES
Subjective   Darnell Abarca is a 62 y.o. male     Chief Complaint   Patient presents with   • Follow-up     presents as a follow up on testing       HPI    Problem List:    1. CAD  1.1 CABG @ [facility] distant past  1.2 C 6/2015 - patient grafts with disease in the native vessels with medical management recommendation; EF 40%  2. Ischemic Cardiomyopathy   2.1 Echo 8/1/2014 - EF 50-55%; DD I; mild LVH; mild TR; trace MO; PA 30-35  3. CHF; class II-III symptoms   4. Dyslipidemia   5. Orthostatic hypotension  6. COPD  7. History of throat CA  8. Event Monitor 5/1-5/14/17 - SB - NSR with PVC  9. Carotid Artery Disease   9.1 Carotid Artery US 5/8/17 - AMALIA 16-49%; 30th 50%; = or < 50% left common carotid artery; antegrade flow both vertebral arteries     Patient is a 62-year-old male who presents today for a follow-up.  He denies any chest pain, pressure, palpitations, fluttering, dizziness, presyncope, syncope, orthopnea, PND or edema. He says sometimes he will get short of breath, but nothing consistent.  He still smokes 1 1/2 - 2 PPD.      We went over carotid US and event monitor.     Current Outpatient Prescriptions   Medication Sig Dispense Refill   • albuterol (VENTOLIN HFA) 108 (90 BASE) MCG/ACT inhaler Inhale 1 puff every 4 (four) hours as needed (shortness of breath). 1 inhaler 6   • atorvastatin (LIPITOR) 80 MG tablet Take 1 tablet by mouth Every Night. 30 tablet 11   • clopidogrel (PLAVIX) 75 MG tablet Take 1 tablet by mouth daily. 90 tablet 3   • gabapentin (NEURONTIN) 100 MG capsule Take 1 capsule by mouth every night.     • nitroglycerin (NITROSTAT) 0.4 MG SL tablet Place  under the tongue.     • omeprazole (PRILOSEC) 20 MG capsule Take 1 capsule by mouth daily.     • oxyCODONE-acetaminophen (PERCOCET)  MG per tablet Take  by mouth.       No current facility-administered medications for this visit.        ALLERGIES    Aspirin; Codeine; Ethanolamine; Nsaids; Other; Salicylates; Theophylline; and  Triprolidine-pseudoephedrine    Past Medical History:   Diagnosis Date   • CAD (coronary artery disease)    • COPD (chronic obstructive pulmonary disease)    • Current every day smoker     1.5-2 PPD   • Dizziness    • Dyslipidemia    • Edema    • History of throat cancer    • Hyperlipidemia    • Hypertension    • Ischemic cardiomyopathy    • Lightheaded    • Myocardial infarction    • Orthostatic hypotension    • SOB (shortness of breath)    • Stroke        Social History     Social History   • Marital status:      Spouse name: N/A   • Number of children: N/A   • Years of education: N/A     Occupational History   • Not on file.     Social History Main Topics   • Smoking status: Current Every Day Smoker     Packs/day: 2.00   • Smokeless tobacco: Not on file      Comment: 1.5-2 PPD   • Alcohol use Yes      Comment: occasionally   • Drug use: No   • Sexual activity: Not on file     Other Topics Concern   • Not on file     Social History Narrative       Family History   Problem Relation Age of Onset   • Diabetes Mother    • Hypertension Mother    • Hypertension Father    • Hyperlipidemia Sister      Familial hypercholesterolemia   • Hypertension Sister    • Hyperlipidemia Brother      Familial hypercholesterolemia   • Heart attack Brother      Acute MI   • Stroke Brother      CVA   • Hypertension Brother    • Heart disease Brother      pacemaker placement   • Other Brother      Pacemaker placement       Review of Systems   Constitutional: Negative for diaphoresis and fatigue.   HENT: Positive for rhinorrhea, sneezing and voice change.    Eyes: Positive for visual disturbance (wears glasses ).   Respiratory: Positive for shortness of breath (sometimes ). Negative for chest tightness.    Cardiovascular: Negative for chest pain, palpitations and leg swelling.   Gastrointestinal: Negative for nausea and vomiting.   Endocrine: Negative.    Genitourinary: Negative for difficulty urinating.   Musculoskeletal: Positive  "for arthralgias, back pain and neck pain.   Skin: Negative.    Allergic/Immunologic: Positive for environmental allergies.   Neurological: Positive for headaches (after eating sweets). Negative for dizziness, syncope and light-headedness.   Hematological: Bruises/bleeds easily.   Psychiatric/Behavioral: The patient is nervous/anxious.        Objective   /77 (BP Location: Left arm, Patient Position: Sitting)  Pulse 50  Ht 69\" (175.3 cm)  Wt 207 lb 9.6 oz (94.2 kg)  SpO2 96%  BMI 30.66 kg/m2  Lab Results (most recent)     None        Physical Exam   Constitutional: He is oriented to person, place, and time. Vital signs are normal. He appears well-developed and well-nourished. He is active and cooperative.   HENT:   Head: Normocephalic.   Right Ear: Decreased hearing is noted.   Left Ear: Decreased hearing is noted.   Mouth/Throat: Abnormal dentition (edentulous ).   hoarseness   Eyes: Lids are normal.   Wears glasses    Neck: Normal carotid pulses, no hepatojugular reflux and no JVD present. Carotid bruit is not present.   Cardiovascular: Regular rhythm and normal heart sounds.  Bradycardia present.    Pulses:       Radial pulses are 2+ on the right side, and 2+ on the left side.        Dorsalis pedis pulses are 2+ on the right side, and 2+ on the left side.        Posterior tibial pulses are 2+ on the right side, and 2+ on the left side.   No edema BLE.    Pulmonary/Chest: Effort normal and breath sounds normal.   Abdominal: Normal appearance.   Neurological: He is alert and oriented to person, place, and time.   Skin: Skin is warm, dry and intact.   Psychiatric: He has a normal mood and affect. His speech is normal and behavior is normal. Judgment and thought content normal. Cognition and memory are normal.       Procedure   Procedures         Assessment/Plan      Diagnosis Plan   1. Coronary artery disease involving coronary bypass graft of native heart without angina pectoris     2. Essential " hypertension     3. Hyperlipidemia, unspecified hyperlipidemia type  atorvastatin (LIPITOR) 80 MG tablet   4. Bilateral carotid artery disease     5. Current every day smoker         Return in about 6 months (around 12/6/2017).         CAD - Doing well, Plavix and statin.  Hypertension - managed now without medication.  Dyslipidemia - on lipitor.  Pablo SUSANNA - doing well.  Smoker - encouraged on smoking cessation.     Patient is doing well from a cardiac standpoint.  He will continue his medication regimen.  He will follow-up in 6 mos or sooner if any changes.

## 2018-03-06 ENCOUNTER — OFFICE VISIT (OUTPATIENT)
Dept: CARDIOLOGY | Facility: CLINIC | Age: 63
End: 2018-03-06

## 2018-03-06 VITALS
WEIGHT: 206.8 LBS | SYSTOLIC BLOOD PRESSURE: 108 MMHG | DIASTOLIC BLOOD PRESSURE: 68 MMHG | BODY MASS INDEX: 30.63 KG/M2 | HEART RATE: 62 BPM | HEIGHT: 69 IN | OXYGEN SATURATION: 95 %

## 2018-03-06 DIAGNOSIS — Z00.00 HEALTHCARE MAINTENANCE: ICD-10-CM

## 2018-03-06 DIAGNOSIS — E78.5 HYPERLIPIDEMIA, UNSPECIFIED HYPERLIPIDEMIA TYPE: ICD-10-CM

## 2018-03-06 DIAGNOSIS — J44.9 CHRONIC OBSTRUCTIVE PULMONARY DISEASE, UNSPECIFIED COPD TYPE (HCC): ICD-10-CM

## 2018-03-06 DIAGNOSIS — I25.810 CORONARY ARTERY DISEASE INVOLVING CORONARY BYPASS GRAFT OF NATIVE HEART WITHOUT ANGINA PECTORIS: Primary | ICD-10-CM

## 2018-03-06 DIAGNOSIS — H91.91 HEARING LOSS OF RIGHT EAR, UNSPECIFIED HEARING LOSS TYPE: ICD-10-CM

## 2018-03-06 DIAGNOSIS — I77.9 BILATERAL CAROTID ARTERY DISEASE (HCC): ICD-10-CM

## 2018-03-06 DIAGNOSIS — R06.02 SHORTNESS OF BREATH: ICD-10-CM

## 2018-03-06 DIAGNOSIS — I10 ESSENTIAL HYPERTENSION: Chronic | ICD-10-CM

## 2018-03-06 DIAGNOSIS — E78.5 DYSLIPIDEMIA: Chronic | ICD-10-CM

## 2018-03-06 DIAGNOSIS — I95.1 ORTHOSTATIC HYPOTENSION: Chronic | ICD-10-CM

## 2018-03-06 DIAGNOSIS — L23.7 POISON IVY: ICD-10-CM

## 2018-03-06 DIAGNOSIS — I25.5 ISCHEMIC CARDIOMYOPATHY: Chronic | ICD-10-CM

## 2018-03-06 DIAGNOSIS — H92.01 RIGHT EAR PAIN: ICD-10-CM

## 2018-03-06 PROCEDURE — 99214 OFFICE O/P EST MOD 30 MIN: CPT | Performed by: NURSE PRACTITIONER

## 2018-03-06 PROCEDURE — 93000 ELECTROCARDIOGRAM COMPLETE: CPT | Performed by: NURSE PRACTITIONER

## 2018-03-06 RX ORDER — METHYLPREDNISOLONE 4 MG/1
TABLET ORAL
Qty: 21 EACH | Refills: 0 | Status: SHIPPED | OUTPATIENT
Start: 2018-03-06 | End: 2018-06-19

## 2018-03-06 RX ORDER — ALBUTEROL SULFATE 90 UG/1
1 AEROSOL, METERED RESPIRATORY (INHALATION) EVERY 4 HOURS PRN
Qty: 1 INHALER | Refills: 6 | Status: SHIPPED | OUTPATIENT
Start: 2018-03-06 | End: 2018-09-19 | Stop reason: SINTOL

## 2018-03-06 RX ORDER — CLOPIDOGREL BISULFATE 75 MG/1
75 TABLET ORAL DAILY
Qty: 30 TABLET | Refills: 11 | Status: SHIPPED | OUTPATIENT
Start: 2018-03-06 | End: 2019-03-19 | Stop reason: SDUPTHER

## 2018-03-06 RX ORDER — ATORVASTATIN CALCIUM 80 MG/1
80 TABLET, FILM COATED ORAL NIGHTLY
Qty: 30 TABLET | Refills: 11 | Status: SHIPPED | OUTPATIENT
Start: 2018-03-06 | End: 2019-03-19 | Stop reason: SDUPTHER

## 2018-03-06 NOTE — PATIENT INSTRUCTIONS
Obesity, Adult  Obesity is the condition of having too much total body fat. Being overweight or obese means that your weight is greater than what is considered healthy for your body size. Obesity is determined by a measurement called BMI. BMI is an estimate of body fat and is calculated from height and weight. For adults, a BMI of 30 or higher is considered obese.  Obesity can eventually lead to other health concerns and major illnesses, including:  · Stroke.  · Coronary artery disease (CAD).  · Type 2 diabetes.  · Some types of cancer, including cancers of the colon, breast, uterus, and gallbladder.  · Osteoarthritis.  · High blood pressure (hypertension).  · High cholesterol.  · Sleep apnea.  · Gallbladder stones.  · Infertility problems.  What are the causes?  The main cause of obesity is taking in (consuming) more calories than your body uses for energy. Other factors that contribute to this condition may include:  · Being born with genes that make you more likely to become obese.  · Having a medical condition that causes obesity. These conditions include:  ¨ Hypothyroidism.  ¨ Polycystic ovarian syndrome (PCOS).  ¨ Binge-eating disorder.  ¨ Cushing syndrome.  · Taking certain medicines, such as steroids, antidepressants, and seizure medicines.  · Not being physically active (sedentary lifestyle).  · Living where there are limited places to exercise safely or buy healthy foods.  · Not getting enough sleep.  What increases the risk?  The following factors may increase your risk of this condition:  · Having a family history of obesity.  · Being a woman of -American descent.  · Being a man of  descent.  What are the signs or symptoms?  Having excessive body fat is the main symptom of this condition.  How is this diagnosed?  This condition may be diagnosed based on:  · Your symptoms.  · Your medical history.  · A physical exam. Your health care provider may measure:  ¨ Your BMI. If you are an adult  with a BMI between 25 and less than 30, you are considered overweight. If you are an adult with a BMI of 30 or higher, you are considered obese.  ¨ The distances around your hips and your waist (circumferences). These may be compared to each other to help diagnose your condition.  ¨ Your skinfold thickness. Your health care provider may gently pinch a fold of your skin and measure it.  How is this treated?  Treatment for this condition often includes changing your lifestyle. Treatment may include some or all of the following:  · Dietary changes. Work with your health care provider and a dietitian to set a weight-loss goal that is healthy and reasonable for you. Dietary changes may include eating:  ¨ Smaller portions. A portion size is the amount of a particular food that is healthy for you to eat at one time. This varies from person to person.  ¨ Low-calorie or low-fat options.  ¨ More whole grains, fruits, and vegetables.  · Regular physical activity. This may include aerobic activity (cardio) and strength training.  · Medicine to help you lose weight. Your health care provider may prescribe medicine if you are unable to lose 1 pound a week after 6 weeks of eating more healthily and doing more physical activity.  · Surgery. Surgical options may include gastric banding and gastric bypass. Surgery may be done if:  ¨ Other treatments have not helped to improve your condition.  ¨ You have a BMI of 40 or higher.  ¨ You have life-threatening health problems related to obesity.  Follow these instructions at home:     Eating and drinking     · Follow recommendations from your health care provider about what you eat and drink. Your health care provider may advise you to:  ¨ Limit fast foods, sweets, and processed snack foods.  ¨ Choose low-fat options, such as low-fat milk instead of whole milk.  ¨ Eat 5 or more servings of fruits or vegetables every day.  ¨ Eat at home more often. This gives you more control over what you  eat.  ¨ Choose healthy foods when you eat out.  ¨ Learn what a healthy portion size is.  ¨ Keep low-fat snacks on hand.  ¨ Avoid sugary drinks, such as soda, fruit juice, iced tea sweetened with sugar, and flavored milk.  ¨ Eat a healthy breakfast.  · Drink enough water to keep your urine clear or pale yellow.  · Do not go without eating for long periods of time (do not fast) or follow a fad diet. Fasting and fad diets can be unhealthy and even dangerous.  Physical Activity   · Exercise regularly, as told by your health care provider. Ask your health care provider what types of exercise are safe for you and how often you should exercise.  · Warm up and stretch before being active.  · Cool down and stretch after being active.  · Rest between periods of activity.  Lifestyle   · Limit the time that you spend in front of your TV, computer, or video game system.  · Find ways to reward yourself that do not involve food.  · Limit alcohol intake to no more than 1 drink a day for nonpregnant women and 2 drinks a day for men. One drink equals 12 oz of beer, 5 oz of wine, or 1½ oz of hard liquor.  General instructions   · Keep a weight loss journal to keep track of the food you eat and how much you exercise you get.  · Take over-the-counter and prescription medicines only as told by your health care provider.  · Take vitamins and supplements only as told by your health care provider.  · Consider joining a support group. Your health care provider may be able to recommend a support group.  · Keep all follow-up visits as told by your health care provider. This is important.  Contact a health care provider if:  · You are unable to meet your weight loss goal after 6 weeks of dietary and lifestyle changes.  This information is not intended to replace advice given to you by your health care provider. Make sure you discuss any questions you have with your health care provider.  Document Released: 01/25/2006 Document Revised:  05/22/2017 Document Reviewed: 10/05/2016  [employer] Interactive Patient Education © 2017 [employer]  MyPlate from [employer]  The general, healthful diet is based on the 2010 Dietary Guidelines for Americans. The amount of food you need to eat from each food group depends on your age, sex, and level of physical activity and can be individualized by a dietitian. Go to ChooseMyPlate.gov for more information.  What do I need to know about the MyPlate plan?  · Enjoy your food, but eat less.  · Avoid oversized portions.  ¨ ½ of your plate should include fruits and vegetables.  ¨ ¼ of your plate should be grains.  ¨ ¼ of your plate should be protein.  Grains   · Make at least half of your grains whole grains.  · For a 2,000 calorie daily food plan, eat 6 oz every day.  · 1 oz is about 1 slice bread, 1 cup cereal, or ½ cup cooked rice, cereal, or pasta.  Vegetables   · Make half your plate fruits and vegetables.  · For a 2,000 calorie daily food plan, eat 2½ cups every day.  · 1 cup is about 1 cup raw or cooked vegetables or vegetable juice or 2 cups raw leafy greens.  Fruits   · Make half your plate fruits and vegetables.  · For a 2,000 calorie daily food plan, eat 2 cups every day.  · 1 cup is about 1 cup fruit or 100% fruit juice or ½ cup dried fruit.  Protein   · For a 2,000 calorie daily food plan, eat 5½ oz every day.  · 1 oz is about 1 oz meat, poultry, or fish, ¼ cup cooked beans, 1 egg, 1 Tbsp peanut butter, or ½ oz nuts or seeds.  Dairy   · Switch to fat-free or low-fat (1%) milk.  · For a 2,000 calorie daily food plan, eat 3 cups every day.  · 1 cup is about 1 cup milk or yogurt or soy milk (soy beverage), 1½ oz natural cheese, or 2 oz processed cheese.  Fats, Oils, and Empty Calories   · Only small amounts of oils are recommended.  · Empty calories are calories from solid fats or added sugars.  · Compare sodium in foods like soup, bread, and frozen meals. Choose the foods with lower numbers.  · Drink water instead  of sugary drinks.  What foods can I eat?  Grains   Whole grains such as whole wheat, quinoa, millet, and bulgur. Bread, rolls, and pasta made from whole grains. Brown or wild rice. Hot or cold cereals made from whole grains and without added sugar.  Vegetables   All fresh vegetables, especially fresh red, dark green, or orange vegetables. Peas and beans. Low-sodium frozen or canned vegetables prepared without added salt. Low-sodium vegetable juices.  Fruits   All fresh, frozen, and dried fruits. Canned fruit packed in water or fruit juice without added sugar. Fruit juices without added sugar.  Meats and Other Protein Sources   Boiled, baked, or grilled lean meat trimmed of fat. Skinless poultry. Fresh seafood and shellfish. Canned seafood packed in water. Unsalted nuts and unsalted nut butters. Tofu. Dried beans and pea. Eggs.  Dairy   Low-fat or fat-free milk, yogurt, and cheeses.  Sweets and Desserts   Frozen desserts made from low-fat milk.  Fats and Oils   Olive, peanut, and canola oils and margarine. Salad dressing and mayonnaise made from these oils.  Other   Soups and casseroles made from allowed ingredients and without added fat or salt.  The items listed above may not be a complete list of recommended foods or beverages. Contact your dietitian for more options.   What foods are not recommended?  Grains   Sweetened, low-fiber cereals. Packaged baked goods. Snack crackers and chips. Cheese crackers, butter crackers, and biscuits. Frozen waffles, sweet breads, doughnuts, pastries, packaged baking mixes, pancakes, cakes, and cookies.  Vegetables   Regular canned or frozen vegetables or vegetables prepared with salt. Canned tomatoes. Canned tomato sauce. Fried vegetables. Vegetables in cream sauce or cheese sauce.  Fruits   Fruits packed in syrup or made with added sugar.  Meats and Other Protein Sources   Marbled or fatty meats such as ribs. Poultry with skin. Fried meats, poultry, eggs, or fish. Sausages, hot  dogs, and deli meats such as pastrami, bologna, or salami.  Dairy   Whole milk, cream, cheeses made from whole milk, sour cream. Ice cream or yogurt made from whole milk or with added sugar.  Beverages   For adults, no more than one alcoholic drink per day. Regular soft drinks or other sugary beverages. Juice drinks.  Sweets and Desserts   Sugary or fatty desserts, candy, and other sweets.  Fats and Oils   Solid shortening or partially hydrogenated oils. Solid margarine. Margarine that contains trans fats. Butter.  The items listed above may not be a complete list of foods and beverages to avoid. Contact your dietitian for more information.   This information is not intended to replace advice given to you by your health care provider. Make sure you discuss any questions you have with your health care provider.  Document Released: 01/06/2009 Document Revised: 05/25/2017 Document Reviewed: 11/26/2014  [employer] Interactive Patient Education © 2017 [employer].  For more information:    Quit Now [place]  1-800-QUIT-NOW  https://[place].quitlogix.org/en-[place]/  Steps to Quit Smoking  Smoking tobacco can be harmful to your health and can affect almost every organ in your body. Smoking puts you, and those around you, at risk for developing many serious chronic diseases. Quitting smoking is difficult, but it is one of the best things that you can do for your health. It is never too late to quit.  What are the benefits of quitting smoking?  When you quit smoking, you lower your risk of developing serious diseases and conditions, such as:  · Lung cancer or lung disease, such as COPD.  · Heart disease.  · Stroke.  · Heart attack.  · Infertility.  · Osteoporosis and bone fractures.  Additionally, symptoms such as coughing, wheezing, and shortness of breath may get better when you quit. You may also find that you get sick less often because your body is stronger at fighting off colds and infections. If you are pregnant, quitting  smoking can help to reduce your chances of having a baby of low birth weight.  How do I get ready to quit?  When you decide to quit smoking, create a plan to make sure that you are successful. Before you quit:  · Pick a date to quit. Set a date within the next two weeks to give you time to prepare.  · Write down the reasons why you are quitting. Keep this list in places where you will see it often, such as on your bathroom mirror or in your car or wallet.  · Identify the people, places, things, and activities that make you want to smoke (triggers) and avoid them. Make sure to take these actions:  ¨ Throw away all cigarettes at home, at work, and in your car.  ¨ Throw away smoking accessories, such as ashtrays and lighters.  ¨ Clean your car and make sure to empty the ashtray.  ¨ Clean your home, including curtains and carpets.  · Tell your family, friends, and coworkers that you are quitting. Support from your loved ones can make quitting easier.  · Talk with your health care provider about your options for quitting smoking.  · Find out what treatment options are covered by your health insurance.  What strategies can I use to quit smoking?  Talk with your healthcare provider about different strategies to quit smoking. Some strategies include:  · Quitting smoking altogether instead of gradually lessening how much you smoke over a period of time. Research shows that quitting “cold turkey” is more successful than gradually quitting.  · Attending in-person counseling to help you build problem-solving skills. You are more likely to have success in quitting if you attend several counseling sessions. Even short sessions of 10 minutes can be effective.  · Finding resources and support systems that can help you to quit smoking and remain smoke-free after you quit. These resources are most helpful when you use them often. They can include:  ¨ Online chats with a counselor.  ¨ Telephone quitlines.  ¨ Printed self-help  materials.  ¨ Support groups or group counseling.  ¨ Text messaging programs.  ¨ Mobile phone applications.  · Taking medicines to help you quit smoking. (If you are pregnant or breastfeeding, talk with your health care provider first.) Some medicines contain nicotine and some do not. Both types of medicines help with cravings, but the medicines that include nicotine help to relieve withdrawal symptoms. Your health care provider may recommend:  ¨ Nicotine patches, gum, or lozenges.  ¨ Nicotine inhalers or sprays.  ¨ Non-nicotine medicine that is taken by mouth.  Talk with your health care provider about combining strategies, such as taking medicines while you are also receiving in-person counseling. Using these two strategies together makes you more likely to succeed in quitting than if you used either strategy on its own.  If you are pregnant or breastfeeding, talk with your health care provider about finding counseling or other support strategies to quit smoking. Do not take medicine to help you quit smoking unless told to do so by your health care provider.  What things can I do to make it easier to quit?  Quitting smoking might feel overwhelming at first, but there is a lot that you can do to make it easier. Take these important actions:  · Reach out to your family and friends and ask that they support and encourage you during this time. Call telephone quitlines, reach out to support groups, or work with a counselor for support.  · Ask people who smoke to avoid smoking around you.  · Avoid places that trigger you to smoke, such as bars, parties, or smoke-break areas at work.  · Spend time around people who do not smoke.  · Lessen stress in your life, because stress can be a smoking trigger for some people. To lessen stress, try:  ¨ Exercising regularly.  ¨ Deep-breathing exercises.  ¨ Yoga.  ¨ Meditating.  ¨ Performing a body scan. This involves closing your eyes, scanning your body from head to toe, and  noticing which parts of your body are particularly tense. Purposefully relax the muscles in those areas.  · Download or purchase mobile phone or tablet apps (applications) that can help you stick to your quit plan by providing reminders, tips, and encouragement. There are many free apps, such as QuitGuide from the CDC (Centers for Disease Control and Prevention). You can find other support for quitting smoking (smoking cessation) through smokefree.gov and other websites.  How will I feel when I quit smoking?  Within the first 24 hours of quitting smoking, you may start to feel some withdrawal symptoms. These symptoms are usually most noticeable 2-3 days after quitting, but they usually do not last beyond 2-3 weeks. Changes or symptoms that you might experience include:  · Mood swings.  · Restlessness, anxiety, or irritation.  · Difficulty concentrating.  · Dizziness.  · Strong cravings for sugary foods in addition to nicotine.  · Mild weight gain.  · Constipation.  · Nausea.  · Coughing or a sore throat.  · Changes in how your medicines work in your body.  · A depressed mood.  · Difficulty sleeping (insomnia).  After the first 2-3 weeks of quitting, you may start to notice more positive results, such as:  · Improved sense of smell and taste.  · Decreased coughing and sore throat.  · Slower heart rate.  · Lower blood pressure.  · Clearer skin.  · The ability to breathe more easily.  · Fewer sick days.  Quitting smoking is very challenging for most people. Do not get discouraged if you are not successful the first time. Some people need to make many attempts to quit before they achieve long-term success. Do your best to stick to your quit plan, and talk with your health care provider if you have any questions or concerns.  This information is not intended to replace advice given to you by your health care provider. Make sure you discuss any questions you have with your health care provider.  Document Released:  12/12/2002 Document Revised: 08/15/2017 Document Reviewed: 05/03/2016  [employer] Interactive Patient Education © 2017 [employer]    Coronary Artery Disease, Male  Coronary artery disease (CAD) is a condition in which the arteries that lead to the heart (coronary arteries) become narrow or blocked. The narrowing or blockage can lead to decreased blood flow to the heart. Prolonged reduced blood flow can cause a heart attack (myocardial infarction or MI). This condition may also be called coronary heart disease.  Because CAD is the leading cause of death in men, it is important to understand what causes this condition and how it is treated.  What are the causes?  CAD is most often caused by atherosclerosis. This is the buildup of fat and cholesterol (plaque) on the inside of the arteries. Over time, the plaque may narrow or block the artery, reducing blood flow to the heart. Plaque can also become weak and break off within a coronary artery and cause a sudden blockage. Other less common causes of CAD include:  · An embolism or blood clot in a coronary artery.  · A tearing of the artery (spontaneous coronary artery dissection).  · An aneurysm.  · Inflammation (vasculitis) in the artery wall.  What increases the risk?  The following factors may make you more likely to develop this condition:  · Age. Men over age 45 are at a greater risk of CAD.  · Family history of CAD.  · Gender. Men often develop CAD earlier in life than women.  · High blood pressure (hypertension).  · Diabetes.  · High cholesterol levels.  · Tobacco use.  · Excessive alcohol use.  · Lack of exercise.  · A diet high in saturated and trans fats, such as fried food and processed meat.  Other possible risk factors include:  · High stress levels.  · Depression.  · Obesity.  · Sleep apnea.  What are the signs or symptoms?  Many people do not have any symptoms during the early stages of CAD. As the condition progresses, symptoms may include:  · Chest pain  (angina). The pain can:  ¨ Feel like a crushing or squeezing, or a tightness, pressure, fullness, or heaviness in the chest.  ¨ Last more than a few minutes or can stop and recur. The pain tends to get worse with exercise or stress and to fade with rest.  · Pain in the arms, neck, jaw, or back.  · Unexplained heartburn or indigestion.  · Shortness of breath.  · Nausea or vomiting.  · Sudden light-headedness.  · Sudden cold sweats.  · Fluttering or fast heartbeat (palpitations).  How is this diagnosed?  This condition is diagnosed based on:  · Your family and medical history.  · A physical exam.  · Tests, including:  ¨ A test to check the electrical signals in your heart (electrocardiogram).  ¨ Exercise stress test. This looks for signs of blockage when the heart is stressed with exercise, such as running on a treadmill.  ¨ Pharmacologic stress test. This test looks for signs of blockage when the heart is being stressed with a medicine.  ¨ Blood tests.  ¨ Coronary angiogram. This is a procedure to look at the coronary arteries to see if there is any blockage. During this test, a dye is injected into your arteries so they appear on an X-ray.  ¨ A test that uses sound waves to take a picture of your heart (echocardiogram).  ¨ Chest X-ray.  How is this treated?  This condition may be treated by:  · Healthy lifestyle changes to reduce risk factors.  · Medicines such as:  ¨ Antiplatelet medicines and blood-thinning medicines, such as aspirin. These help to prevent blood clots.  ¨ Nitroglycerin.  ¨ Blood pressure medicines.  ¨ Cholesterol-lowering medicine.  · Coronary angioplasty and stenting. During this procedure, a thin, flexible tube is inserted through a blood vessel and into a blocked artery. A balloon or similar device on the end of the tube is inflated to open up the artery. In some cases, a small, mesh tube (stent) is inserted into the artery to keep it open.  · Coronary artery bypass surgery. During this  surgery, veins or arteries from other parts of the body are used to create a bypass around the blockage and allow blood to reach your heart.  Follow these instructions at home:  Medicines   · Take over-the-counter and prescription medicines only as told by your health care provider.  · Do not take the following medicines unless your health care provider approves:  ¨ NSAIDs, such as ibuprofen, naproxen, or celecoxib.  ¨ Vitamin supplements that contain vitamin A, vitamin E, or both.  Lifestyle   · Follow an exercise program approved by your health care provider. Aim for 150 minutes of moderate exercise or 75 minutes of vigorous exercise each week.  · Maintain a healthy weight or lose weight as approved by your health care provider.  · Rest when you are tired.  · Learn to manage stress or try to limit your stress. Ask your health care provider for suggestions if you need help.  · Get screened for depression and seek treatment, if needed.  · Do not use any products that contain nicotine or tobacco, such as cigarettes and e-cigarettes. If you need help quitting, ask your health care provider.  · Do not use illegal drugs.  Eating and drinking   · Follow a heart-healthy diet. A dietitian can help educate you about healthy food options and changes. In general, eat plenty of fruits and vegetables, lean meats, and whole grains.  · Avoid foods high in:  ¨ Sugar.  ¨ Salt (sodium).  ¨ Saturated fat, such as processed or fatty meat.  ¨ Trans fat, such as fried foods.  · Use healthy cooking methods such as roasting, grilling, broiling, baking, poaching, steaming, or stir-frying.  · If you drink alcohol, and your health care provider approves, limit your alcohol intake to no more than 2 drinks per day. One drink equals 12 ounces of beer, 5 ounces of wine, or 1½ ounces of hard liquor.  General instructions   · Manage any other health conditions, such as hypertension and diabetes. These conditions affect your heart.  · Your health  "care provider may ask you to monitor your blood pressure. Ideally, your blood pressure should be below 130/80.  · Keep all follow-up visits as told by your health care provider. This is important.  Get help right away if:  · You have pain in your chest, neck, arm, jaw, stomach, or back that:  ¨ Lasts more than a few minutes.  ¨ Is recurring.  ¨ Is not relieved by taking medicine under your tongue (sublingualnitroglycerin).  · You have too much (profuse) sweating without cause.  · You have unexplained:  ¨ Heartburn or indigestion.  ¨ Shortness of breath or difficulty breathing.  ¨ Fluttering or fast heartbeat (palpitations).  ¨ Nausea or vomiting.  ¨ Fatigue.  ¨ Feelings of nervousness or anxiety.  ¨ Weakness.  ¨ Diarrhea.  · You have sudden light-headedness or dizziness.  · You faint.  · You feel like hurting yourself or think about taking your own life.  These symptoms may represent a serious problem that is an emergency. Do not wait to see if the symptoms will go away. Get medical help right away. Call your local emergency services (911 in the [place] Do not drive yourself to the hospital.  Summary  · Coronary artery disease (CAD) is a process in which the arteries that lead to the heart (coronary arteries) become narrow or blocked. The narrowing or blockage can lead to a heart attack.  · Many people do not have any symptoms during the early stages of CAD. This is called \"silent CAD.\"  · CAD can be treated with lifestyle changes, medicines, surgery, or a combination of these treatments.  This information is not intended to replace advice given to you by your health care provider. Make sure you discuss any questions you have with your health care provider.  Document Released: 07/15/2015 Document Revised: 12/08/2017 Document Reviewed: 12/08/2017  "[employer] Interactive Patient Education © 2017 [employer]    "

## 2018-03-06 NOTE — PROGRESS NOTES
Subjective   Darnell Abarca is a 62 y.o. male     Chief Complaint   Patient presents with   • Coronary Artery Disease     presents as a follow up       HPI    Problem List:    1. CAD  1.1 CABG @ [facility] distant past  1.2 [facility] 6/2015 - patient grafts with disease in the native vessels with medical management recommendation; EF 40%  2. Ischemic Cardiomyopathy   2.1 Echo 8/1/2014 - EF 50-55%; DD I; mild LVH; mild TR; trace [place]; PA 30-35  2.2 Echo 4/20/17-mild LVH, EF 45-50%, diastolic dysfunction 1, mild MR, mild TR, PA 30-35, dilation of aortic root  3. CHF; class II-III symptoms   4. Dyslipidemia   5. Orthostatic hypotension  6. COPD  7. History of throat CA  8. Event Monitor 5/1-5/14/17 - SB - NSR with PVC  9. Carotid Artery Disease   9.1 Carotid Artery US 5/8/17 - AMALIA 16-49%; 30th 50%; = or < 50% left common carotid artery; antegrade flow both vertebral arteries     Patient is a 62-year-old male who presents today for follow-up with family side.  He says he had some chest pain whenever he took antibiotic percent as he stopped anabolic and went away.  He thinks he had some kind of allergic reaction to it.  He had no other episodes.  He denies any palpitations or fluttering.  He says he will get dizzy if he gets up too quick but that's been getting better since he's been eating bananas he said.  He denies any presyncope, syncope, orthopnea or PND.  He only in the left knee due to arthritis.  He says that he will get short of breath and fatigued a little more here lately.  He said he used to be at work for 4 hours with no problems and now he can only do about 2-1/2 hours with a work.  Patient is still smoking 1-1/2 packs a day.    Current Outpatient Prescriptions   Medication Sig Dispense Refill   • albuterol (VENTOLIN HFA) 108 (90 Base) MCG/ACT inhaler Inhale 1 puff Every 4 (Four) Hours As Needed (shortness of breath). 1 inhaler 6   • atorvastatin (LIPITOR) 80 MG tablet Take 1 tablet by mouth Every Night. 30 tablet 11   •  clopidogrel (PLAVIX) 75 MG tablet Take 1 tablet by mouth Daily. 30 tablet 11   • gabapentin (NEURONTIN) 100 MG capsule Take 1 capsule by mouth every night.     • nitroglycerin (NITROSTAT) 0.4 MG SL tablet Place  under the tongue.     • omeprazole (PRILOSEC) 20 MG capsule Take 1 capsule by mouth daily.     • oxyCODONE-acetaminophen (PERCOCET)  MG per tablet Take  by mouth.     • Fluticasone Furoate-Vilanterol 100-25 MCG/INH aerosol powder  Inhale 1 puff Daily. 28 each 6   • MethylPREDNISolone (MEDROL, SILVESTRE,) 4 MG tablet Take as directed on package instructions. 21 each 0     No current facility-administered medications for this visit.        ALLERGIES    Aspirin; Codeine; Ethanolamine; Nsaids; Other; Salicylates; Theophylline; and Triprolidine-pseudoephedrine    Past Medical History:   Diagnosis Date   • CAD (coronary artery disease)    • COPD (chronic obstructive pulmonary disease)    • Current every day smoker     1.5-2 PPD   • Dizziness    • Dyslipidemia    • Edema    • History of throat cancer    • Hyperlipidemia    • Hypertension    • Ischemic cardiomyopathy    • Lightheaded    • Myocardial infarction    • Orthostatic hypotension    • SOB (shortness of breath)    • Stroke        Social History     Social History   • Marital status:      Spouse name: N/A   • Number of children: N/A   • Years of education: N/A     Occupational History   • Not on file.     Social History Main Topics   • Smoking status: Current Every Day Smoker     Packs/day: 2.00   • Smokeless tobacco: Never Used      Comment: 1.5-2 PPD   • Alcohol use Yes      Comment: occasionally   • Drug use: No   • Sexual activity: Not on file     Other Topics Concern   • Not on file     Social History Narrative       Family History   Problem Relation Age of Onset   • Diabetes Mother    • Hypertension Mother    • Hypertension Father    • Hyperlipidemia Sister      Familial hypercholesterolemia   • Hypertension Sister    • Hyperlipidemia Brother   "    Familial hypercholesterolemia   • Heart attack Brother      Acute MI   • Stroke Brother      CVA   • Hypertension Brother    • Heart disease Brother      pacemaker placement   • Other Brother      Pacemaker placement       Review of Systems   Constitutional: Positive for fatigue. Negative for diaphoresis.   HENT: Positive for rhinorrhea, sinus pressure, sneezing and voice change.    Eyes: Positive for visual disturbance ( wears glasses).   Respiratory: Positive for shortness of breath (use to be able to work 4 hr day now can only work 2 1/2 days ). Negative for chest tightness.    Cardiovascular: Positive for chest pain (when took an antibiotic as soon as he stopped it went away. ) and leg swelling (left knee due to arthritis). Negative for palpitations.   Gastrointestinal: Negative for constipation, diarrhea, nausea and vomiting.   Endocrine: Negative.    Genitourinary: Negative for difficulty urinating.   Musculoskeletal: Positive for arthralgias and back pain (low back ). Negative for myalgias and neck pain.   Skin: Negative.    Allergic/Immunologic: Positive for environmental allergies.   Neurological: Positive for dizziness (if he gets up too quick, but getting better ). Negative for syncope, light-headedness and headaches.   Hematological: Bruises/bleeds easily (bruises easily ).   Psychiatric/Behavioral: The patient is nervous/anxious.        Objective   /68 (BP Location: Left arm, Patient Position: Sitting)  Pulse 62  Ht 175.3 cm (69\")  Wt 93.8 kg (206 lb 12.8 oz)  SpO2 95%  BMI 30.54 kg/m2  Vitals:    03/06/18 1339   BP: 108/68   BP Location: Left arm   Patient Position: Sitting   Pulse: 62   SpO2: 95%   Weight: 93.8 kg (206 lb 12.8 oz)   Height: 175.3 cm (69\")      Lab Results (most recent)     None        Physical Exam   Constitutional: He is oriented to person, place, and time. Vital signs are normal. He appears well-developed and well-nourished. He is active and cooperative.   HENT: "   Head: Normocephalic.   Right Ear: Decreased hearing is noted.   Left Ear: Decreased hearing is noted.   Mouth/Throat: Abnormal dentition (edentulous ).   Eyes: Lids are normal.   Wears glasses    Neck: Normal carotid pulses, no hepatojugular reflux and no JVD present. Carotid bruit is not present.   Cardiovascular: Normal rate, regular rhythm and normal heart sounds.    Pulses:       Radial pulses are 2+ on the right side, and 2+ on the left side.        Dorsalis pedis pulses are 2+ on the right side, and 2+ on the left side.        Posterior tibial pulses are 2+ on the right side, and 2+ on the left side.   No edema BLE.    Pulmonary/Chest: Effort normal. He has decreased breath sounds in the right lower field and the left lower field. He has wheezes (expiration ) in the right upper field, the right middle field, the left upper field and the left middle field.   Abdominal: Normal appearance and bowel sounds are normal.   Musculoskeletal:        Left knee: He exhibits decreased range of motion and swelling. Tenderness found.   Neurological: He is alert and oriented to person, place, and time.   Skin: Skin is warm and dry. Rash noted. Rash is urticarial (both arms and hands).   Psychiatric: He has a normal mood and affect. His speech is normal and behavior is normal. Judgment and thought content normal. Cognition and memory are normal.       Procedure     ECG 12 Lead  Date/Time: 3/6/2018 3:59 PM  Performed by: NATIVIDAD TREADWELL  Authorized by: NATIVIDAD TREADWELL   Comparison: compared with previous ECG from 4/25/2017  Rhythm: sinus rhythm  Rate: bradycardic  BPM: 57  ST Depression: V6, V5, aVL, II and aVF  QRS axis: normal  Clinical impression: non-specific ECG                 Assessment/Plan      Diagnosis Plan   1. Coronary artery disease involving coronary bypass graft of native heart without angina pectoris  clopidogrel (PLAVIX) 75 MG tablet    Lipid Panel    Lipid Panel   2. Ischemic cardiomyopathy     3.  Orthostatic hypotension     4. Dyslipidemia  Lipid Panel    Lipid Panel   5. Essential hypertension  Comprehensive Metabolic Panel    TSH    Comprehensive Metabolic Panel    TSH   6. Bilateral carotid artery disease  Lipid Panel    Lipid Panel   7. Hyperlipidemia, unspecified hyperlipidemia type  atorvastatin (LIPITOR) 80 MG tablet   8. Poison ivy  MethylPREDNISolone (MEDROL, SILVESTRE,) 4 MG tablet   9. Shortness of breath  albuterol (VENTOLIN HFA) 108 (90 Base) MCG/ACT inhaler   10. Chronic obstructive pulmonary disease, unspecified COPD type  Fluticasone Furoate-Vilanterol 100-25 MCG/INH aerosol powder    11. Healthcare maintenance  Comprehensive Metabolic Panel    Lipid Panel    TSH    PSA Diagnostic    Comprehensive Metabolic Panel    Lipid Panel    TSH    PSA Diagnostic   12. Hearing loss of right ear, unspecified hearing loss type  Ambulatory Referral to ENT (Otolaryngology)   13. Right ear pain  Ambulatory Referral to ENT (Otolaryngology)       Return in about 3 months (around 6/6/2018).    CAD-patient is on Plavix and statin.  Ischemic cardiomyopathy-patient is not on any medications for this at this time.  Orthostatic hypotension-doing well.  Dyslipidemia-patient is on Lipitor monitor by PCP.  Hypertension-patient doing well.  Bilateral carotid artery disease-patient is on aspirin and statin.  Shortness of breath-patient is using inhalers.  I will also send in a daily inhaler for him.  Hearing loss of right ear/right ear pain-patient will be referred to ENT.  Smoking-patient encouraged on smoking cessation.  Patient will continue his medication regimen.  Patient will get lipids, CMP and PSA and TSH.  Patient will follow-up in 3 months or sooner if any changes.    Discussed with patient having a stress test due to increase in fatigue and shortness of breath however he feels like he's doing well and does not want one at this time.       I advised the patient of the risks in continuing to use tobacco, and I  provided this patient with smoking cessation educational materials.    During this visit, I spent < 3 minutes counseling the patient regarding smoking cessation.    Patient's BMI is above normal parameters. Follow-up plan includes:  educational material.      Electronically signed by:

## 2018-06-19 ENCOUNTER — OFFICE VISIT (OUTPATIENT)
Dept: CARDIOLOGY | Facility: CLINIC | Age: 63
End: 2018-06-19

## 2018-06-19 VITALS
BODY MASS INDEX: 29.33 KG/M2 | HEART RATE: 58 BPM | HEIGHT: 69 IN | WEIGHT: 198 LBS | OXYGEN SATURATION: 98 % | SYSTOLIC BLOOD PRESSURE: 121 MMHG | DIASTOLIC BLOOD PRESSURE: 74 MMHG

## 2018-06-19 DIAGNOSIS — I25.10 CHRONIC CORONARY ARTERY DISEASE: Primary | Chronic | ICD-10-CM

## 2018-06-19 DIAGNOSIS — I25.5 ISCHEMIC CARDIOMYOPATHY: Chronic | ICD-10-CM

## 2018-06-19 DIAGNOSIS — E78.5 DYSLIPIDEMIA: Chronic | ICD-10-CM

## 2018-06-19 DIAGNOSIS — F17.200 CURRENT EVERY DAY SMOKER: Chronic | ICD-10-CM

## 2018-06-19 DIAGNOSIS — I10 ESSENTIAL HYPERTENSION: Chronic | ICD-10-CM

## 2018-06-19 DIAGNOSIS — J06.9 UPPER RESPIRATORY TRACT INFECTION, UNSPECIFIED TYPE: ICD-10-CM

## 2018-06-19 DIAGNOSIS — R06.02 SHORTNESS OF BREATH: ICD-10-CM

## 2018-06-19 PROCEDURE — 99214 OFFICE O/P EST MOD 30 MIN: CPT | Performed by: NURSE PRACTITIONER

## 2018-06-19 RX ORDER — AMOXICILLIN AND CLAVULANATE POTASSIUM 875; 125 MG/1; MG/1
1 TABLET, FILM COATED ORAL EVERY 12 HOURS SCHEDULED
Qty: 20 TABLET | Refills: 0 | Status: SHIPPED | OUTPATIENT
Start: 2018-06-19 | End: 2018-09-19

## 2018-06-19 NOTE — PROGRESS NOTES
Subjective   Darnell Abarca is a 63 y.o. male     Chief Complaint   Patient presents with   • Coronary Artery Disease     Presents for 3 month follow up.    • Cardiomyopathy   • Shortness of Breath   • Hypertension       HPI    Problem List:    1. CAD  1.1 CABG @ [facility] distant past  1.2 [facility] 6/2015 - patient grafts with disease in the native vessels with medical management recommendation; EF 40%  2. Ischemic Cardiomyopathy   2.1 Echo 8/1/2014 - EF 50-55%; DD I; mild LVH; mild TR; trace [place]; PA 30-35  2.2 Echo 4/20/17-mild LVH, EF 45-50%, diastolic dysfunction 1, mild MR, mild TR, PA 30-35, dilation of aortic root  3. CHF; class II-III symptoms   4. Dyslipidemia   5. Orthostatic hypotension  6. COPD  7. History of throat CA  8. Event Monitor 5/1-5/14/17 - SB - NSR with PVC  9. Carotid Artery Disease   9.1 Carotid Artery US 5/8/17 - AMALIA 16-49%; 30th 50%; = or < 50% left common carotid artery; antegrade flow both vertebral arteries     Patient is a 63-year-old male who presents today for a follow-up with his family and his side.  He denies any chest pain, pressure, palpitations, fluttering, dizziness, presyncope, syncope, orthopnea, PND or edema.  He says he will get lightheaded if he stands for long period of time.  He did miss the appointment with the ENT as he did not get the appointment letter until after the appointment.  They're to call and reschedule.  Patient does have shortness of breath with minimal exertion is been sick over 2 weeks still he has a productive cough he says the steroids made him jittery systolic taken this.  He also has sinus pressure and pain.  I will try to start him on Augmentin and see how he does.  He still smokes on occasion.    Current Outpatient Prescriptions   Medication Sig Dispense Refill   • albuterol (VENTOLIN HFA) 108 (90 Base) MCG/ACT inhaler Inhale 1 puff Every 4 (Four) Hours As Needed (shortness of breath). 1 inhaler 6   • atorvastatin (LIPITOR) 80 MG tablet Take 1 tablet by mouth  Every Night. 30 tablet 11   • clopidogrel (PLAVIX) 75 MG tablet Take 1 tablet by mouth Daily. 30 tablet 11   • Fluticasone Furoate-Vilanterol 100-25 MCG/INH aerosol powder  Inhale 1 puff Daily. 28 each 6   • gabapentin (NEURONTIN) 100 MG capsule Take 1 capsule by mouth every night.     • nitroglycerin (NITROSTAT) 0.4 MG SL tablet Place  under the tongue.     • omeprazole (PRILOSEC) 20 MG capsule Take 1 capsule by mouth daily.     • oxyCODONE-acetaminophen (PERCOCET)  MG per tablet Take 1 tablet by mouth Every 8 (Eight) Hours As Needed.     • amoxicillin-clavulanate (AUGMENTIN) 875-125 MG per tablet Take 1 tablet by mouth Every 12 (Twelve) Hours. 20 tablet 0     No current facility-administered medications for this visit.        ALLERGIES    Aspirin; Codeine; Ethanolamine; Nsaids; Other; Salicylates; Theophylline; and Triprolidine-pseudoephedrine    Past Medical History:   Diagnosis Date   • CAD (coronary artery disease)    • COPD (chronic obstructive pulmonary disease)    • Current every day smoker     1.5-2 PPD   • Dizziness    • Dyslipidemia    • Edema    • History of throat cancer    • Hyperlipidemia    • Hypertension    • Ischemic cardiomyopathy    • Lightheaded    • Myocardial infarction    • Orthostatic hypotension    • SOB (shortness of breath)    • Stroke        Social History     Social History   • Marital status:      Spouse name: N/A   • Number of children: N/A   • Years of education: N/A     Occupational History   • Not on file.     Social History Main Topics   • Smoking status: Current Every Day Smoker     Packs/day: 2.00     Types: Cigarettes   • Smokeless tobacco: Never Used   • Alcohol use Yes      Comment: occasionally   • Drug use: No   • Sexual activity: Defer     Other Topics Concern   • Not on file     Social History Narrative   • No narrative on file       Family History   Problem Relation Age of Onset   • Diabetes Mother    • Hypertension Mother    • Hypertension Father    •  "Hyperlipidemia Sister         Familial hypercholesterolemia   • Hypertension Sister    • Hyperlipidemia Brother         Familial hypercholesterolemia   • Heart attack Brother         Acute MI   • Stroke Brother         CVA   • Hypertension Brother    • Heart disease Brother         pacemaker placement   • Other Brother         Pacemaker placement       Review of Systems   Constitutional: Positive for fatigue and unexpected weight change (Has lost 12 lbs in last 3 months. No appetite.). Negative for chills, diaphoresis and fever.   HENT: Positive for congestion, hearing loss (Patient is [place]), sinus pressure and sneezing. Negative for rhinorrhea.    Eyes: Positive for visual disturbance (Wears glasses).   Respiratory: Positive for cough (Productive purulent sputum), chest tightness, shortness of breath (With minimal exertion; been sick for over 2 weeks ) and wheezing.    Cardiovascular: Negative for chest pain, palpitations and leg swelling.   Gastrointestinal: Positive for abdominal pain (For last 2 weeks). Negative for blood in stool, constipation, diarrhea, nausea and vomiting.   Endocrine: Negative.    Genitourinary: Positive for hematuria (Chronic.). Negative for difficulty urinating.   Musculoskeletal: Positive for arthralgias (Chronic), back pain (Chronic) and myalgias (To BLE). Negative for neck pain.   Skin: Negative.    Allergic/Immunologic: Positive for environmental allergies.   Neurological: Positive for weakness (Generalized) and light-headedness (Still reports light-headedness if stands in one place for too long.). Negative for dizziness, syncope and headaches.   Hematological: Bruises/bleeds easily (Both).   Psychiatric/Behavioral: Positive for sleep disturbance (Wakes sometimes throughout the night. Denies waking at night SOA.).       Objective   /74 (BP Location: Left arm, Patient Position: Sitting)   Pulse 58   Ht 175.3 cm (69\")   Wt 89.8 kg (198 lb)   SpO2 98%   BMI 29.24 kg/m²   Vitals: " "   06/19/18 1337   BP: 121/74   BP Location: Left arm   Patient Position: Sitting   Pulse: 58   SpO2: 98%   Weight: 89.8 kg (198 lb)   Height: 175.3 cm (69\")      Lab Results (most recent)     None        Physical Exam   Constitutional: He is oriented to person, place, and time. Vital signs are normal. He appears well-developed and well-nourished. He is active and cooperative.   HENT:   Head: Normocephalic.   Right Ear: Decreased hearing is noted.   Left Ear: Decreased hearing is noted.   Mouth/Throat: Abnormal dentition (edentulous ).   Eyes: Lids are normal.   Wears glasses    Neck: Normal carotid pulses, no hepatojugular reflux and no JVD present. Carotid bruit is not present.   Cardiovascular: Regular rhythm and normal heart sounds.  Bradycardia present.    Pulses:       Radial pulses are 2+ on the right side, and 2+ on the left side.        Dorsalis pedis pulses are 2+ on the right side, and 2+ on the left side.        Posterior tibial pulses are 2+ on the right side, and 2+ on the left side.   No edema BLE.    Pulmonary/Chest: Effort normal. He has wheezes (anterior ) in the right upper field and the left upper field.   Abdominal: Normal appearance and bowel sounds are normal.   Neurological: He is alert and oriented to person, place, and time.   Skin: Skin is warm, dry and intact.   Psychiatric: He has a normal mood and affect. His speech is normal and behavior is normal. Judgment and thought content normal. Cognition and memory are normal.       Procedure   Procedures         Assessment/Plan      Diagnosis Plan   1. Chronic coronary artery disease     2. Essential hypertension     3. Ischemic cardiomyopathy     4. Dyslipidemia     5. Current every day smoker     6. Shortness of breath         Return in about 3 months (around 9/19/2018).    CAD-patient is on Plavix and statin.  Hypertension-patient doing well.  Ischemic cardiomyopathy-patient is stable.  Dyslipidemia-patient is on Lipitor and monitor by PCP.  " Smoking-patient encouraged on cessation.  Shortness of breath-stable.  Patient will continue his medication regimen.  He will follow up in 3 months or sooner if any changes.  URI-patient sent to an antibiotic.  If patient continues to have upper respiratory symptoms he is to follow-up with his PCP.       I advised Darnell of the risks of continuing to use tobacco, and I provided him with tobacco cessation educational materials in the After Visit Summary.     During this visit, I spent less than 3 minutes counseling the patient regarding tobacco cessation.    Patient's Body mass index is 29.24 kg/m². BMI is above normal parameters. Recommendations include: educational material.      Electronically signed by:

## 2018-06-19 NOTE — PATIENT INSTRUCTIONS
Steps to Quit Smoking  Smoking tobacco can be bad for your health. It can also affect almost every organ in your body. Smoking puts you and people around you at risk for many serious long-lasting (chronic) diseases. Quitting smoking is hard, but it is one of the best things that you can do for your health. It is never too late to quit.  What are the benefits of quitting smoking?  When you quit smoking, you lower your risk for getting serious diseases and conditions. They can include:  · Lung cancer or lung disease.  · Heart disease.  · Stroke.  · Heart attack.  · Not being able to have children (infertility).  · Weak bones (osteoporosis) and broken bones (fractures).    If you have coughing, wheezing, and shortness of breath, those symptoms may get better when you quit. You may also get sick less often. If you are pregnant, quitting smoking can help to lower your chances of having a baby of low birth weight.  What can I do to help me quit smoking?  Talk with your doctor about what can help you quit smoking. Some things you can do (strategies) include:  · Quitting smoking totally, instead of slowly cutting back how much you smoke over a period of time.  · Going to in-person counseling. You are more likely to quit if you go to many counseling sessions.  · Using resources and support systems, such as:  ? Online chats with a counselor.  ? Phone quitlines.  ? Printed self-help materials.  ? Support groups or group counseling.  ? Text messaging programs.  ? Mobile phone apps or applications.  · Taking medicines. Some of these medicines may have nicotine in them. If you are pregnant or breastfeeding, do not take any medicines to quit smoking unless your doctor says it is okay. Talk with your doctor about counseling or other things that can help you.    Talk with your doctor about using more than one strategy at the same time, such as taking medicines while you are also going to in-person counseling. This can help make  quitting easier.  What things can I do to make it easier to quit?  Quitting smoking might feel very hard at first, but there is a lot that you can do to make it easier. Take these steps:  · Talk to your family and friends. Ask them to support and encourage you.  · Call phone quitlines, reach out to support groups, or work with a counselor.  · Ask people who smoke to not smoke around you.  · Avoid places that make you want (trigger) to smoke, such as:  ? Bars.  ? Parties.  ? Smoke-break areas at work.  · Spend time with people who do not smoke.  · Lower the stress in your life. Stress can make you want to smoke. Try these things to help your stress:  ? Getting regular exercise.  ? Deep-breathing exercises.  ? Yoga.  ? Meditating.  ? Doing a body scan. To do this, close your eyes, focus on one area of your body at a time from head to toe, and notice which parts of your body are tense. Try to relax the muscles in those areas.  · Download or buy apps on your mobile phone or tablet that can help you stick to your quit plan. There are many free apps, such as QuitGuide from the CDC (Centers for Disease Control and Prevention). You can find more support from smokefree.gov and other websites.    This information is not intended to replace advice given to you by your health care provider. Make sure you discuss any questions you have with your health care provider.  Document Released: 10/14/2010 Document Revised: 08/15/2017 Document Reviewed: 05/03/2016  [employer] Interactive Patient Education © 2018 [employer]    MyPlate from [employer]  The general, healthful diet is based on the 2010 Dietary Guidelines for Americans. The amount of food you need to eat from each food group depends on your age, sex, and level of physical activity and can be individualized by a dietitian. Go to ChooseMyPlate.gov for more information.  What do I need to know about the MyPlate plan?  · Enjoy your food, but eat less.  · Avoid oversized portions.  ? ½  of your plate should include fruits and vegetables.  ? ¼ of your plate should be grains.  ? ¼ of your plate should be protein.  Grains  · Make at least half of your grains whole grains.  · For a 2,000 calorie daily food plan, eat 6 oz every day.  · 1 oz is about 1 slice bread, 1 cup cereal, or ½ cup cooked rice, cereal, or pasta.  Vegetables  · Make half your plate fruits and vegetables.  · For a 2,000 calorie daily food plan, eat 2½ cups every day.  · 1 cup is about 1 cup raw or cooked vegetables or vegetable juice or 2 cups raw leafy greens.  Fruits  · Make half your plate fruits and vegetables.  · For a 2,000 calorie daily food plan, eat 2 cups every day.  · 1 cup is about 1 cup fruit or 100% fruit juice or ½ cup dried fruit.  Protein  · For a 2,000 calorie daily food plan, eat 5½ oz every day.  · 1 oz is about 1 oz meat, poultry, or fish, ¼ cup cooked beans, 1 egg, 1 Tbsp peanut butter, or ½ oz nuts or seeds.  Dairy  · Switch to fat-free or low-fat (1%) milk.  · For a 2,000 calorie daily food plan, eat 3 cups every day.  · 1 cup is about 1 cup milk or yogurt or soy milk (soy beverage), 1½ oz natural cheese, or 2 oz processed cheese.  Fats, Oils, and Empty Calories  · Only small amounts of oils are recommended.  · Empty calories are calories from solid fats or added sugars.  · Compare sodium in foods like soup, bread, and frozen meals. Choose the foods with lower numbers.  · Drink water instead of sugary drinks.  What foods can I eat?  Grains  Whole grains such as whole wheat, quinoa, millet, and bulgur. Bread, rolls, and pasta made from whole grains. Brown or wild rice. Hot or cold cereals made from whole grains and without added sugar.  Vegetables  All fresh vegetables, especially fresh red, dark green, or orange vegetables. Peas and beans. Low-sodium frozen or canned vegetables prepared without added salt. Low-sodium vegetable juices.  Fruits  All fresh, frozen, and dried fruits. Canned fruit packed in water  or fruit juice without added sugar. Fruit juices without added sugar.  Meats and Other Protein Sources  Boiled, baked, or grilled lean meat trimmed of fat. Skinless poultry. Fresh seafood and shellfish. Canned seafood packed in water. Unsalted nuts and unsalted nut butters. Tofu. Dried beans and pea. Eggs.  Dairy  Low-fat or fat-free milk, yogurt, and cheeses.  Sweets and Desserts  Frozen desserts made from low-fat milk.  Fats and Oils  Olive, peanut, and canola oils and margarine. Salad dressing and mayonnaise made from these oils.  Other  Soups and casseroles made from allowed ingredients and without added fat or salt.  The items listed above may not be a complete list of recommended foods or beverages. Contact your dietitian for more options.  What foods are not recommended?  Grains  Sweetened, low-fiber cereals. Packaged baked goods. Snack crackers and chips. Cheese crackers, butter crackers, and biscuits. Frozen waffles, sweet breads, doughnuts, pastries, packaged baking mixes, pancakes, cakes, and cookies.  Vegetables  Regular canned or frozen vegetables or vegetables prepared with salt. Canned tomatoes. Canned tomato sauce. Fried vegetables. Vegetables in cream sauce or cheese sauce.  Fruits  Fruits packed in syrup or made with added sugar.  Meats and Other Protein Sources  Marbled or fatty meats such as ribs. Poultry with skin. Fried meats, poultry, eggs, or fish. Sausages, hot dogs, and deli meats such as pastrami, bologna, or salami.  Dairy  Whole milk, cream, cheeses made from whole milk, sour cream. Ice cream or yogurt made from whole milk or with added sugar.  Beverages  For adults, no more than one alcoholic drink per day. Regular soft drinks or other sugary beverages. Juice drinks.  Sweets and Desserts  Sugary or fatty desserts, candy, and other sweets.  Fats and Oils  Solid shortening or partially hydrogenated oils. Solid margarine. Margarine that contains trans fats. Butter.  The items listed above  may not be a complete list of foods and beverages to avoid. Contact your dietitian for more information.  This information is not intended to replace advice given to you by your health care provider. Make sure you discuss any questions you have with your health care provider.  Document Released: 01/06/2009 Document Revised: 05/25/2017 Document Reviewed: 11/26/2014  [employer] Interactive Patient Education © 2018 [employer]

## 2018-09-19 ENCOUNTER — OFFICE VISIT (OUTPATIENT)
Dept: CARDIOLOGY | Facility: CLINIC | Age: 63
End: 2018-09-19

## 2018-09-19 ENCOUNTER — LAB (OUTPATIENT)
Dept: LAB | Facility: HOSPITAL | Age: 63
End: 2018-09-19

## 2018-09-19 VITALS
SYSTOLIC BLOOD PRESSURE: 81 MMHG | HEART RATE: 61 BPM | BODY MASS INDEX: 28.97 KG/M2 | WEIGHT: 195.6 LBS | OXYGEN SATURATION: 99 % | HEIGHT: 69 IN | DIASTOLIC BLOOD PRESSURE: 58 MMHG

## 2018-09-19 DIAGNOSIS — I95.1 ORTHOSTATIC HYPOTENSION: ICD-10-CM

## 2018-09-19 DIAGNOSIS — R06.02 SHORTNESS OF BREATH: ICD-10-CM

## 2018-09-19 DIAGNOSIS — R07.2 PRECORDIAL PAIN: Primary | ICD-10-CM

## 2018-09-19 DIAGNOSIS — I25.5 ISCHEMIC CARDIOMYOPATHY: ICD-10-CM

## 2018-09-19 DIAGNOSIS — E78.5 DYSLIPIDEMIA: ICD-10-CM

## 2018-09-19 DIAGNOSIS — R42 DIZZINESS: ICD-10-CM

## 2018-09-19 DIAGNOSIS — F17.200 CURRENT EVERY DAY SMOKER: Chronic | ICD-10-CM

## 2018-09-19 DIAGNOSIS — I25.709 CORONARY ARTERY DISEASE INVOLVING CORONARY BYPASS GRAFT OF NATIVE HEART WITH ANGINA PECTORIS (HCC): ICD-10-CM

## 2018-09-19 DIAGNOSIS — E61.2 MAGNESIUM DEFICIENCY: ICD-10-CM

## 2018-09-19 DIAGNOSIS — E87.6 HYPOKALEMIA: ICD-10-CM

## 2018-09-19 DIAGNOSIS — I10 ESSENTIAL HYPERTENSION: ICD-10-CM

## 2018-09-19 DIAGNOSIS — Z00.00 HEALTHCARE MAINTENANCE: ICD-10-CM

## 2018-09-19 PROCEDURE — 99214 OFFICE O/P EST MOD 30 MIN: CPT | Performed by: NURSE PRACTITIONER

## 2018-09-19 PROCEDURE — 80048 BASIC METABOLIC PNL TOTAL CA: CPT | Performed by: NURSE PRACTITIONER

## 2018-09-19 PROCEDURE — 36415 COLL VENOUS BLD VENIPUNCTURE: CPT

## 2018-09-19 PROCEDURE — 83735 ASSAY OF MAGNESIUM: CPT | Performed by: NURSE PRACTITIONER

## 2018-09-19 RX ORDER — POTASSIUM CHLORIDE 20 MEQ/1
20 TABLET, EXTENDED RELEASE ORAL 2 TIMES DAILY
Qty: 60 TABLET | Refills: 3 | Status: SHIPPED | OUTPATIENT
Start: 2018-09-19 | End: 2019-12-16 | Stop reason: SDUPTHER

## 2018-09-19 RX ORDER — POTASSIUM CHLORIDE 20 MEQ/1
20 TABLET, EXTENDED RELEASE ORAL 2 TIMES DAILY
COMMUNITY
End: 2018-09-19 | Stop reason: SDUPTHER

## 2018-09-19 RX ORDER — FLUDROCORTISONE ACETATE 0.1 MG/1
0.1 TABLET ORAL DAILY
Qty: 12 TABLET | Refills: 6 | Status: SHIPPED | OUTPATIENT
Start: 2018-09-19 | End: 2018-09-27 | Stop reason: SDUPTHER

## 2018-09-19 NOTE — PROGRESS NOTES
Subjective   Darnell Abarca is a 63 y.o. male     Chief Complaint   Patient presents with   • Follow-up     Presents for follow up.    • Coronary Artery Disease   • Chest Pain     Was recently in ER with CP.        HPI    Problem List:    1. CAD  1.1 CABG @ [facility] distant past  1.2 [facility] 6/2015 - patient grafts with disease in the native vessels with medical management recommendation; EF 40%  2. Ischemic Cardiomyopathy   2.1 Echo 8/1/2014 - EF 50-55%; DD I; mild LVH; mild TR; trace WY; PA 30-35  2.2 Echo 4/20/17-mild LVH, EF 45-50%, diastolic dysfunction 1, mild MR, mild TR, PA 30-35, dilation of aortic root  3. CHF; class II-III symptoms   4. Dyslipidemia   5. Orthostatic hypotension  6. COPD  7. History of throat CA  8. Event Monitor 5/1-5/14/17 - SB - NSR with PVC  9. Carotid Artery Disease   9.1 Carotid Artery US 5/8/17 - AMALIA 16-49%; 30th 50%; = or < 50% left common carotid artery; antegrade flow both vertebral arteries   10. Smoking Habituation     Patient is a 63-year-old male who presents today for a follow-up with family member at his side.  Patient has been to the ER 3 times.  He had one episode of chest pain about a week ago and was given a nitroglycerin and he says it resolved.  At first he denied and then said he did but didn't give me any other information regarding a.  He denies any palpitations, fluttering, syncope, orthopnea, PND or edema.  Patient says he has been having cramping in his legs however his potassium and magnesium were both low so he was started on his medications.  He is been having these episodes were he's had bilateral lower extreme weakness, nausea, shortness of breath, fatigue and dizziness.  He says he just feel like he's going to pass out so he basically just days laying down all the time he said.  He says he is short of breath with very minimal activity.  He says all of his stents he's ever had an open-heart he says he's never had the same symptoms.  Patient still smokes a half a pack  a day.    Current Outpatient Prescriptions   Medication Sig Dispense Refill   • atorvastatin (LIPITOR) 80 MG tablet Take 1 tablet by mouth Every Night. 30 tablet 11   • clopidogrel (PLAVIX) 75 MG tablet Take 1 tablet by mouth Daily. 30 tablet 11   • gabapentin (NEURONTIN) 100 MG capsule Take 1 capsule by mouth 3 (Three) Times a Day.     • magnesium oxide (MAGOX) 400 (241.3 Mg) MG tablet tablet Take 1 tablet by mouth 2 (Two) Times a Day. 60 each 3   • nitroglycerin (NITROSTAT) 0.4 MG SL tablet Place  under the tongue.     • omeprazole (PRILOSEC) 20 MG capsule Take 1 capsule by mouth daily.     • oxyCODONE-acetaminophen (PERCOCET)  MG per tablet Take 1 tablet by mouth Every 8 (Eight) Hours As Needed.     • potassium chloride (K-DUR,KLOR-CON) 20 MEQ CR tablet Take 1 tablet by mouth 2 (Two) Times a Day. 60 tablet 3   • fludrocortisone 0.1 MG tablet Take 1 tablet by mouth Daily. 1 tab by mouth on Monday,Wednesday, and Friday 12 tablet 6     No current facility-administered medications for this visit.        ALLERGIES    Aspirin; Codeine; Ethanolamine; Nsaids; Other; Salicylates; Theophylline; and Triprolidine-pseudoephedrine    Past Medical History:   Diagnosis Date   • CAD (coronary artery disease)    • COPD (chronic obstructive pulmonary disease) (CMS/[facility])    • Current every day smoker     1.5-2 PPD   • Dizziness    • Dyslipidemia    • Edema    • History of throat cancer    • Hyperlipidemia    • Hypertension    • Ischemic cardiomyopathy    • Lightheaded    • Myocardial infarction    • Orthostatic hypotension    • SOB (shortness of breath)    • Stroke (CMS/[facility])        Social History     Social History   • Marital status:      Spouse name: N/A   • Number of children: N/A   • Years of education: N/A     Occupational History   • Not on file.     Social History Main Topics   • Smoking status: Current Every Day Smoker     Packs/day: 2.00     Types: Cigarettes   • Smokeless tobacco: Never Used   • Alcohol use  "Yes      Comment: occasionally   • Drug use: No   • Sexual activity: Defer     Other Topics Concern   • Not on file     Social History Narrative   • No narrative on file       Family History   Problem Relation Age of Onset   • Diabetes Mother    • Hypertension Mother    • Hypertension Father    • Hyperlipidemia Sister         Familial hypercholesterolemia   • Hypertension Sister    • Hyperlipidemia Brother         Familial hypercholesterolemia   • Heart attack Brother         Acute MI   • Stroke Brother         CVA   • Hypertension Brother    • Heart disease Brother         pacemaker placement   • Other Brother         Pacemaker placement       Review of Systems   Constitutional: Positive for diaphoresis (\"when my legs start tingling and i start cramping all over\") and fatigue. Negative for chills and fever.   HENT: Negative for congestion, rhinorrhea, sneezing and sore throat.    Eyes: Positive for visual disturbance (Wears glasses).   Respiratory: Positive for cough (Productive cough) and shortness of breath (With minimal exertion). Negative for chest tightness.    Cardiovascular: Positive for chest pain (Was seen in ER 1 week ago for CP. Was evaluated and released. None since.  ). Negative for palpitations and leg swelling.   Gastrointestinal: Positive for diarrhea (Had diarrhea for 4 days prior to Potassium and Magnesium being low). Negative for abdominal pain, blood in stool, constipation, nausea and vomiting.   Endocrine: Positive for heat intolerance (Get SOA). Negative for cold intolerance.   Genitourinary: Negative for hematuria.   Musculoskeletal: Positive for arthralgias, back pain, myalgias (To BLE: Potassium and Magnesium levels were low in the ER) and neck pain.   Skin: Negative.    Allergic/Immunologic: Negative for environmental allergies.   Neurological: Positive for dizziness, weakness (BLE ), light-headedness (Vertigo with position change and \"in hot weather\") and numbness (and tingling in feet ). " "Negative for syncope and headaches.   Hematological: Bruises/bleeds easily (Both).   Psychiatric/Behavioral: Negative for sleep disturbance (Denies waking at night SOA).       Objective   BP (!) 81/58 (BP Location: Left arm, Patient Position: Standing)   Pulse 61   Ht 175.3 cm (69\")   Wt 88.7 kg (195 lb 9.6 oz)   SpO2 99%   BMI 28.89 kg/m²   Vitals:    09/19/18 1356 09/19/18 1437 09/19/18 1438 09/19/18 1439   BP: 128/82 110/70 121/80 (!) 81/58   BP Location: Left arm Left arm Left arm Left arm   Patient Position: Sitting Lying Sitting Standing   Pulse: 60 56 57 61   SpO2: 99%      Weight: 88.7 kg (195 lb 9.6 oz)      Height: 175.3 cm (69\")         Lab Results (most recent)     None        Physical Exam   Constitutional: He is oriented to person, place, and time. Vital signs are normal. He appears well-developed and well-nourished. He is active and cooperative.   HENT:   Head: Normocephalic.   Right Ear: Decreased hearing is noted.   Left Ear: Decreased hearing is noted.   Mouth/Throat: Abnormal dentition (edentulous ).   Eyes: Lids are normal.   Wears glasses    Neck: Normal carotid pulses, no hepatojugular reflux and no JVD present. Carotid bruit is not present.   Cardiovascular: Normal rate, regular rhythm and normal heart sounds.    Pulses:       Radial pulses are 2+ on the right side, and 2+ on the left side.        Dorsalis pedis pulses are 2+ on the right side, and 2+ on the left side.        Posterior tibial pulses are 2+ on the right side, and 2+ on the left side.   No edema BLE.   Pulmonary/Chest: Effort normal and breath sounds normal.   Abdominal: Normal appearance and bowel sounds are normal.   Neurological: He is alert and oriented to person, place, and time.   Skin: Skin is warm, dry and intact.   Psychiatric: He has a normal mood and affect. His speech is normal and behavior is normal. Judgment and thought content normal. Cognition and memory are normal.       Procedure   Procedures   "       Assessment/Plan      Diagnosis Plan   1. Precordial pain  Adult Transthoracic Echo Complete W/ Cont if Necessary Per Protocol    Adult Transthoracic Echo Complete W/ Cont if Necessary Per Protocol   2. Essential hypertension  Basic Metabolic Panel    Stress Test With Myocardial Perfusion One Day    Adult Transthoracic Echo Complete W/ Cont if Necessary Per Protocol    Stress Test With Myocardial Perfusion One Day    Adult Transthoracic Echo Complete W/ Cont if Necessary Per Protocol   3. Ischemic cardiomyopathy  Stress Test With Myocardial Perfusion One Day    Adult Transthoracic Echo Complete W/ Cont if Necessary Per Protocol    Stress Test With Myocardial Perfusion One Day    Adult Transthoracic Echo Complete W/ Cont if Necessary Per Protocol   4. Orthostatic hypotension  Stress Test With Myocardial Perfusion One Day    Adult Transthoracic Echo Complete W/ Cont if Necessary Per Protocol    fludrocortisone 0.1 MG tablet    Stress Test With Myocardial Perfusion One Day    Adult Transthoracic Echo Complete W/ Cont if Necessary Per Protocol   5. Shortness of breath  Stress Test With Myocardial Perfusion One Day    Adult Transthoracic Echo Complete W/ Cont if Necessary Per Protocol    Stress Test With Myocardial Perfusion One Day    Adult Transthoracic Echo Complete W/ Cont if Necessary Per Protocol   6. Dyslipidemia  Stress Test With Myocardial Perfusion One Day    Adult Transthoracic Echo Complete W/ Cont if Necessary Per Protocol    Stress Test With Myocardial Perfusion One Day    Adult Transthoracic Echo Complete W/ Cont if Necessary Per Protocol   7. Coronary artery disease involving coronary bypass graft of native heart with angina pectoris (CMS/HCC)  Stress Test With Myocardial Perfusion One Day    Adult Transthoracic Echo Complete W/ Cont if Necessary Per Protocol    Stress Test With Myocardial Perfusion One Day    Adult Transthoracic Echo Complete W/ Cont if Necessary Per Protocol   8. Hypokalemia   potassium chloride (K-DUR,KLOR-CON) 20 MEQ CR tablet    Stress Test With Myocardial Perfusion One Day    Adult Transthoracic Echo Complete W/ Cont if Necessary Per Protocol    Stress Test With Myocardial Perfusion One Day    Adult Transthoracic Echo Complete W/ Cont if Necessary Per Protocol   9. Magnesium deficiency  magnesium oxide (MAGOX) 400 (241.3 Mg) MG tablet tablet    Magnesium    Stress Test With Myocardial Perfusion One Day    Adult Transthoracic Echo Complete W/ Cont if Necessary Per Protocol    Stress Test With Myocardial Perfusion One Day    Adult Transthoracic Echo Complete W/ Cont if Necessary Per Protocol   10. Healthcare maintenance  Basic Metabolic Panel    Magnesium    Stress Test With Myocardial Perfusion One Day    Adult Transthoracic Echo Complete W/ Cont if Necessary Per Protocol    Stress Test With Myocardial Perfusion One Day    Adult Transthoracic Echo Complete W/ Cont if Necessary Per Protocol   11. Current every day smoker     12. Dizziness         Return in about 9 weeks (around 11/21/2018).  Chest pain/hypertension/ischemic cardiopathy/shortness of breath/dyslipidemia/CAD-patient will have ischemia workup, stress and echo.  He will use nitroglycerin when necessary for chest pain no resolution he will go to the ER.  Patient will get a BMP and magnesium today.  I did refill his medications for now we will make adjustments accordingly.  Orthostatic hypotension-Patient was very orthostatic today almost a 40 mmHg drop.  I will start him on Florinef 0.1 milligram Monday Wednesday and Friday.  We can make adjustments to this as needed.  Patient was provided information on ways to avoid orthostasis as well including wearing compression socks and eating sodium.  He will continue his medication regimen for now.  He will follow-up in 9 weeks or sooner if any changes.         I advised Darnell of the risks of continuing to use tobacco, and I provided him with tobacco cessation educational materials  in the After Visit Summary.     During this visit, I spent less than 3 minutes counseling the patient regarding tobacco cessation.    Patient's Body mass index is 28.89 kg/m². BMI is above normal parameters. Recommendations include: educational material.      Electronically signed by:

## 2018-09-19 NOTE — PATIENT INSTRUCTIONS
Steps to Quit Smoking  Smoking tobacco can be bad for your health. It can also affect almost every organ in your body. Smoking puts you and people around you at risk for many serious long-lasting (chronic) diseases. Quitting smoking is hard, but it is one of the best things that you can do for your health. It is never too late to quit.  What are the benefits of quitting smoking?  When you quit smoking, you lower your risk for getting serious diseases and conditions. They can include:  · Lung cancer or lung disease.  · Heart disease.  · Stroke.  · Heart attack.  · Not being able to have children (infertility).  · Weak bones (osteoporosis) and broken bones (fractures).    If you have coughing, wheezing, and shortness of breath, those symptoms may get better when you quit. You may also get sick less often. If you are pregnant, quitting smoking can help to lower your chances of having a baby of low birth weight.  What can I do to help me quit smoking?  Talk with your doctor about what can help you quit smoking. Some things you can do (strategies) include:  · Quitting smoking totally, instead of slowly cutting back how much you smoke over a period of time.  · Going to in-person counseling. You are more likely to quit if you go to many counseling sessions.  · Using resources and support systems, such as:  ? Online chats with a counselor.  ? Phone quitlines.  ? Printed self-help materials.  ? Support groups or group counseling.  ? Text messaging programs.  ? Mobile phone apps or applications.  · Taking medicines. Some of these medicines may have nicotine in them. If you are pregnant or breastfeeding, do not take any medicines to quit smoking unless your doctor says it is okay. Talk with your doctor about counseling or other things that can help you.    Talk with your doctor about using more than one strategy at the same time, such as taking medicines while you are also going to in-person counseling. This can help make  quitting easier.  What things can I do to make it easier to quit?  Quitting smoking might feel very hard at first, but there is a lot that you can do to make it easier. Take these steps:  · Talk to your family and friends. Ask them to support and encourage you.  · Call phone quitlines, reach out to support groups, or work with a counselor.  · Ask people who smoke to not smoke around you.  · Avoid places that make you want (trigger) to smoke, such as:  ? Bars.  ? Parties.  ? Smoke-break areas at work.  · Spend time with people who do not smoke.  · Lower the stress in your life. Stress can make you want to smoke. Try these things to help your stress:  ? Getting regular exercise.  ? Deep-breathing exercises.  ? Yoga.  ? Meditating.  ? Doing a body scan. To do this, close your eyes, focus on one area of your body at a time from head to toe, and notice which parts of your body are tense. Try to relax the muscles in those areas.  · Download or buy apps on your mobile phone or tablet that can help you stick to your quit plan. There are many free apps, such as QuitGuide from the CDC (Centers for Disease Control and Prevention). You can find more support from smokefree.gov and other websites.    This information is not intended to replace advice given to you by your health care provider. Make sure you discuss any questions you have with your health care provider.  Document Released: 10/14/2010 Document Revised: 08/15/2017 Document Reviewed: 05/03/2016  [employer] Interactive Patient Education © 2018 [employer]  Fat and Cholesterol Restricted Diet  Getting too much fat and cholesterol in your diet may cause health problems. Following this diet helps keep your fat and cholesterol at normal levels. This can keep you from getting sick.  What types of fat should I choose?  · Choose monosaturated and polyunsaturated fats. These are found in foods such as olive oil, canola oil, flaxseeds, walnuts, almonds, and seeds.  · Eat more  "omega-3 fats. Good choices include salmon, mackerel, sardines, tuna, flaxseed oil, and ground flaxseeds.  · Limit saturated fats. These are in animal products such as meats, butter, and cream. They can also be in plant products such as palm oil, palm kernel oil, and coconut oil.  · Avoid foods with partially hydrogenated oils in them. These contain trans fats. Examples of foods that have trans fats are stick margarine, some tub margarines, cookies, crackers, and other baked goods.  What general guidelines do I need to follow?  · Check food labels. Look for the words \"trans fat\" and \"saturated fat.\"  · When preparing a meal:  ? Fill half of your plate with vegetables and green salads.  ? Fill one fourth of your plate with whole grains. Look for the word \"whole\" as the first word in the ingredient list.  ? Fill one fourth of your plate with lean protein foods.  · Eat more foods that have fiber, like apples, carrots, beans, peas, and barley.  · Eat more home-cooked foods. Eat less at restaurants and buffets.  · Limit or avoid alcohol.  · Limit foods high in starch and sugar.  · Limit fried foods.  · Cook foods without frying them. Baking, boiling, grilling, and broiling are all great options.  · Lose weight if you are overweight. Losing even a small amount of weight can help your overall health. It can also help prevent diseases such as diabetes and heart disease.  What foods can I eat?  Grains  Whole grains, such as whole wheat or whole grain breads, crackers, cereals, and pasta. Unsweetened oatmeal, bulgur, barley, quinoa, or brown rice. Corn or whole wheat flour tortillas.  Vegetables  Fresh or frozen vegetables (raw, steamed, roasted, or grilled). Green salads.  Fruits  All fresh, canned (in natural juice), or frozen fruits.  Meat and Other Protein Products  Ground beef (85% or leaner), grass-fed beef, or beef trimmed of fat. Skinless chicken or turkey. Ground chicken or turkey. Pork trimmed of fat. All fish and " seafood. Eggs. Dried beans, peas, or lentils. Unsalted nuts or seeds. Unsalted canned or dry beans.  Dairy  Low-fat dairy products, such as skim or 1% milk, 2% or reduced-fat cheeses, low-fat ricotta or cottage cheese, or plain low-fat yogurt.  Fats and Oils  Tub margarines without trans fats. Light or reduced-fat mayonnaise and salad dressings. Avocado. Olive, canola, sesame, or safflower oils. Natural peanut or almond butter (choose ones without added sugar and oil).  The items listed above may not be a complete list of recommended foods or beverages. Contact your dietitian for more options.  What foods are not recommended?  Grains  White bread. White pasta. White rice. Cornbread. Bagels, pastries, and croissants. Crackers that contain trans fat.  Vegetables  White potatoes. Corn. Creamed or fried vegetables. Vegetables in a cheese sauce.  Fruits  Dried fruits. Canned fruit in light or heavy syrup. Fruit juice.  Meat and Other Protein Products  Fatty cuts of meat. Ribs, chicken wings, ruelas, sausage, bologna, salami, chitterlings, fatback, hot dogs, bratwurst, and packaged luncheon meats. Liver and organ meats.  Dairy  Whole or 2% milk, cream, half-and-half, and cream cheese. Whole milk cheeses. Whole-fat or sweetened yogurt. Full-fat cheeses. Nondairy creamers and whipped toppings. Processed cheese, cheese spreads, or cheese curds.  Sweets and Desserts  Corn syrup, sugars, honey, and molasses. Candy. Jam and jelly. Syrup. Sweetened cereals. Cookies, pies, cakes, donuts, muffins, and ice cream.  Fats and Oils  Butter, stick margarine, lard, shortening, ghee, or ruelas fat. Coconut, palm kernel, or palm oils.  Beverages  Alcohol. Sweetened drinks (such as sodas, lemonade, and fruit drinks or punches).  The items listed above may not be a complete list of foods and beverages to avoid. Contact your dietitian for more information.  This information is not intended to replace advice given to you by your health care  provider. Make sure you discuss any questions you have with your health care provider.  Document Released: 06/18/2013 Document Revised: 08/24/2017 Document Reviewed: 03/19/2015  [employer] Interactive Patient Education © 2018 [employer]  BMI for Adults  Body mass index (BMI) is a number that is calculated from a person's weight and height. In most adults, the number is used to find how much of an adult's weight is made up of fat. BMI is not as accurate as a direct measure of body fat.  How is BMI calculated?  BMI is calculated by dividing weight in kilograms by height in meters squared. It can also be calculated by dividing weight in pounds by height in inches squared, then multiplying the resulting number by 703. Charts are available to help you find your BMI quickly and easily without doing this calculation.  How is BMI interpreted?  Health care professionals use BMI charts to identify whether an adult is underweight, at a normal weight, or overweight based on the following guidelines:  · Underweight: BMI less than 18.5.  · Normal weight: BMI between 18.5 and 24.9.  · Overweight: BMI between 25 and 29.9.  · Obese: BMI of 30 and above.    BMI is usually interpreted the same for males and females.  Weight includes both fat and muscle, so someone with a muscular build, such as an athlete, may have a BMI that is higher than 24.9. In cases like these, BMI may not accurately depict body fat. To determine if excess body fat is the cause of a BMI of 25 or higher, further assessments may need to be done by a health care provider.  Why is BMI a useful tool?  BMI is used to identify a possible weight problem that may be related to a medical problem or may increase the risk for medical problems. BMI can also be used to promote changes to reach a healthy weight.  This information is not intended to replace advice given to you by your health care provider. Make sure you discuss any questions you have with your health care  provider.  Document Released: 08/29/2005 Document Revised: 04/27/2017 Document Reviewed: 05/15/2015  [employer] Interactive Patient Education © 2018 [employer]    Orthostatic Hypotension  Orthostatic hypotension is a sudden drop in blood pressure that happens when you quickly change positions, such as when you get up from a seated or lying position. Blood pressure is a measurement of how strongly, or weakly, your blood is pressing against the walls of your arteries. Arteries are blood vessels that carry blood from your heart throughout your body. When blood pressure is too low, you may not get enough blood to your brain or to the rest of your organs. This can cause weakness, light-headedness, rapid heartbeat, and fainting. This can last for just a few seconds or for up to a few minutes.  Orthostatic hypotension is usually not a serious problem. However, if it happens frequently or gets worse, it may be a sign of something more serious.  What are the causes?  This condition may be caused by:  · Sudden changes in posture, such as standing up quickly after you have been sitting or lying down.  · Blood loss.  · Loss of body fluids (dehydration).  · Heart problems.  · Hormone (endocrine) problems.  · Pregnancy.  · Severe infection.  · Lack of certain nutrients.  · Severe allergic reactions (anaphylaxis).  · Certain medicines, such as blood pressure medicine or medicines that make the body lose excess fluids (diuretics). Sometimes, this condition can be caused by not taking medicine as directed, such as taking too much of a certain medicine.    What increases the risk?  Certain factors can make you more likely to develop orthostatic hypotension, including:  · Age. Risk increases as you get older.  · Conditions that affect the heart or the central nervous system.  · Taking certain medicines, such as blood pressure medicine or diuretics.  · Being pregnant.    What are the signs or symptoms?  Symptoms of this condition may  "include:  · Weakness.  · Light-headedness.  · Dizziness.  · Blurred vision.  · Fatigue.  · Rapid heartbeat.  · Fainting, in severe cases.    How is this diagnosed?  This condition is diagnosed based on:  · Your medical history.  · Your symptoms.  · Your blood pressure measurement. Your health care provider will check your blood pressure when you are:  ? Lying down.  ? Sitting.  ? Standing.    A blood pressure reading is recorded as two numbers, such as \"120 over 80\" (or 120/80). The first (\"top\") number is called the systolic pressure. It is a measure of the pressure in your arteries as your heart beats. The second (\"bottom\") number is called the diastolic pressure. It is a measure of the pressure in your arteries when your heart relaxes between beats. Blood pressure is measured in a unit called mm Hg. Healthy blood pressure for adults is 120/80. If your blood pressure is below 90/60, you may be diagnosed with hypotension.  Other information or tests that may be used to diagnose orthostatic hypotension include:  · Your other vital signs, such as your heart rate and temperature.  · Blood tests.  · Tilt table test. For this test, you will be safely secured to a table that moves you from a lying position to an upright position. Your heart rhythm and blood pressure will be monitored during the test.    How is this treated?  Treatment for this condition may include:  · Changing your diet. This may involve eating more salt (sodium) or drinking more water.  · Taking medicines to raise your blood pressure.  · Changing the dosage of certain medicines you are taking that might be lowering your blood pressure.  · Wearing compression stockings. These stockings help to prevent blood clots and reduce swelling in your legs.    In some cases, you may need to go to the hospital for:  · Fluid replacement. This means you will receive fluids through an IV tube.  · Blood replacement. This means you will receive donated blood through an " IV tube (transfusion).  · Treating an infection or heart problems, if this applies.  · Monitoring. You may need to be monitored while medicines that you are taking wear off.    Follow these instructions at home:  Eating and drinking    · Drink enough fluid to keep your urine clear or pale yellow.  · Eat a healthy diet and follow instructions from your health care provider about eating or drinking restrictions. A healthy diet includes:  ? Fresh fruits and vegetables.  ? Whole grains.  ? Lean meats.  ? Low-fat dairy products.  · Eat extra salt only as directed. Do not add extra salt to your diet unless your health care provider told you to do that.  · Eat frequent, small meals.  · Avoid standing up suddenly after eating.  Medicines  · Take over-the-counter and prescription medicines only as told by your health care provider.  ? Follow instructions from your health care provider about changing the dosage of your current medicines, if this applies.  ? Do not stop or adjust any of your medicines on your own.  General instructions  · Wear compression stockings as told by your health care provider.  · Get up slowly from lying down or sitting positions. This gives your blood pressure a chance to adjust.  · Avoid hot showers and excessive heat as directed by your health care provider.  · Return to your normal activities as told by your health care provider. Ask your health care provider what activities are safe for you.  · Do not use any products that contain nicotine or tobacco, such as cigarettes and e-cigarettes. If you need help quitting, ask your health care provider.  · Keep all follow-up visits as told by your health care provider. This is important.  Contact a health care provider if:  · You vomit.  · You have diarrhea.  · You have a fever for more than 2-3 days.  · You feel more thirsty than usual.  · You feel weak and tired.  Get help right away if:  · You have chest pain.  · You have a fast or irregular  heartbeat.  · You develop numbness in any part of your body.  · You cannot move your arms or your legs.  · You have trouble speaking.  · You become sweaty or feel lightheaded.  · You faint.  · You feel short of breath.  · You have trouble staying awake.  · You feel confused.  This information is not intended to replace advice given to you by your health care provider. Make sure you discuss any questions you have with your health care provider.  Document Released: 12/08/2003 Document Revised: 09/05/2017 Document Reviewed: 06/09/2017  [employer] Interactive Patient Education © 2018 [employer]

## 2018-09-20 LAB
ANION GAP SERPL CALCULATED.3IONS-SCNC: 5.7 MMOL/L (ref 3.6–11.2)
BUN BLD-MCNC: 12 MG/DL (ref 7–21)
BUN/CREAT SERPL: 6.8 (ref 7–25)
CALCIUM SPEC-SCNC: 8.6 MG/DL (ref 7.7–10)
CHLORIDE SERPL-SCNC: 105 MMOL/L (ref 99–112)
CO2 SERPL-SCNC: 23.3 MMOL/L (ref 24.3–31.9)
CREAT BLD-MCNC: 1.77 MG/DL (ref 0.43–1.29)
GFR SERPL CREATININE-BSD FRML MDRD: 39 ML/MIN/1.73
GLUCOSE BLD-MCNC: 94 MG/DL (ref 70–110)
MAGNESIUM SERPL-MCNC: 1.6 MG/DL (ref 1.7–2.6)
OSMOLALITY SERPL CALC.SUM OF ELEC: 267.7 MOSM/KG (ref 273–305)
POTASSIUM BLD-SCNC: 4.5 MMOL/L (ref 3.5–5.3)
SODIUM BLD-SCNC: 134 MMOL/L (ref 135–153)

## 2018-09-27 ENCOUNTER — TELEPHONE (OUTPATIENT)
Dept: CARDIOLOGY | Facility: CLINIC | Age: 63
End: 2018-09-27

## 2018-09-27 DIAGNOSIS — I95.1 ORTHOSTATIC HYPOTENSION: Chronic | ICD-10-CM

## 2018-09-27 RX ORDER — FLUDROCORTISONE ACETATE 0.1 MG/1
0.1 TABLET ORAL DAILY
Qty: 16 TABLET | Refills: 5 | Status: SHIPPED | OUTPATIENT
Start: 2018-09-27 | End: 2019-03-19 | Stop reason: SDUPTHER

## 2018-09-27 NOTE — TELEPHONE ENCOUNTER
CALLED STATING PATIENT WAS WANDERING IF HE COULD START TAKING THE FLORINEF EVERY DAY INSTEAD OF JUST M/W/F. STATES HE FEELS BETTER ON DAYS HE TAKES AND ON DAYS HE DOESN'T HE DON'T FEEL AS GOOD. HE DOES NOT CHECK HIS B/P OR H/R AT HOME. I DISCUSSED WITH HER THAT HE NEEDS TO BE CHECKING THEM AT HOME. PER NATIVIDAD TREADWELL, NP MAY START TAKING THE FLORINEF ON Saturday OR Sunday, BUT THAT'S ALL THE CHANGE IN THE DOSING SHE WANTS TO DO NOW. ILYA WILKINSON, VERBALIZED OK. PH,LPN

## 2018-10-29 ENCOUNTER — APPOINTMENT (OUTPATIENT)
Dept: CARDIOLOGY | Facility: HOSPITAL | Age: 63
End: 2018-10-29

## 2018-10-29 ENCOUNTER — HOSPITAL ENCOUNTER (OUTPATIENT)
Dept: CARDIOLOGY | Facility: HOSPITAL | Age: 63
End: 2018-10-29

## 2018-10-29 ENCOUNTER — TELEPHONE (OUTPATIENT)
Dept: CARDIOLOGY | Facility: CLINIC | Age: 63
End: 2018-10-29

## 2018-10-29 NOTE — TELEPHONE ENCOUNTER
After calling the patient he states he did not need to know lab results.  he was not able to come for his testing today and he is needing to reschedule his testing. I provided the patient with [facility] scheduling number and advised him to call and have his testing rescheduled.

## 2018-10-29 NOTE — TELEPHONE ENCOUNTER
----- Message from Lisa Guajardo MA sent at 10/29/2018  9:23 AM EDT -----  Call patient back at [phone] for lab results

## 2018-10-30 ENCOUNTER — TELEPHONE (OUTPATIENT)
Dept: CARDIOLOGY | Facility: CLINIC | Age: 63
End: 2018-10-30

## 2018-10-30 DIAGNOSIS — R79.89 ELEVATED SERUM CREATININE: Primary | ICD-10-CM

## 2018-10-30 NOTE — TELEPHONE ENCOUNTER
Per chart review, pt had labs done on 9/19/18:  Magnesium was a little low at 1.6   Several other labs are a little out of range.   Creatinine was elevated at 1.77  Osmolality calc was low at 267.7    Documentation shows:   Notes recorded by Jo Arias LPN on 10/22/2018 at 2:04 PM EDT  Still unable to reach patient at numbers listed in chart. They state that the person is not accepting phone calls at this time.  ------    Notes recorded by Jo Arias LPN on 10/11/2018 at 9:23 AM EDT  Have made numerous attempts to reach patient. Numbers listed for patient do not have voicemail set up. Will continue to attempt to reach patient.  ------    Notes recorded by Tess Vergara APRN on 9/20/2018 at 12:18 PM EDT  REFER PATIENT TO NEPHROLOGY, LAST YEAR CR WAS NORMAL AND HE IS NOT ON ANY MEDICATION THAT I CAN STOP TO CORRECT THIS.

## 2018-10-30 NOTE — TELEPHONE ENCOUNTER
----- Message from Lisa Guajardo MA sent at 10/30/2018 11:38 AM EDT -----  Patient called needing lab results. Call back is [phone]

## 2018-12-10 ENCOUNTER — HOSPITAL ENCOUNTER (OUTPATIENT)
Dept: CARDIOLOGY | Facility: HOSPITAL | Age: 63
Discharge: HOME | End: 2018-12-10

## 2018-12-10 VITALS — WEIGHT: 195.55 LBS | HEIGHT: 69 IN | BODY MASS INDEX: 28.96 KG/M2

## 2018-12-10 PROCEDURE — 78452 HT MUSCLE IMAGE SPECT MULT: CPT

## 2018-12-10 PROCEDURE — 93017 CV STRESS TEST TRACING ONLY: CPT

## 2018-12-10 PROCEDURE — A9500 TC99M SESTAMIBI: HCPCS | Performed by: INTERNAL MEDICINE

## 2018-12-10 PROCEDURE — 93306 TTE W/DOPPLER COMPLETE: CPT | Performed by: INTERNAL MEDICINE

## 2018-12-10 PROCEDURE — 0 TECHNETIUM SESTAMIBI: Performed by: INTERNAL MEDICINE

## 2018-12-10 PROCEDURE — 93018 CV STRESS TEST I&R ONLY: CPT | Performed by: INTERNAL MEDICINE

## 2018-12-10 PROCEDURE — 93306 TTE W/DOPPLER COMPLETE: CPT

## 2018-12-10 PROCEDURE — 78452 HT MUSCLE IMAGE SPECT MULT: CPT | Performed by: INTERNAL MEDICINE

## 2018-12-10 PROCEDURE — 25010000002 REGADENOSON 0.4 MG/5ML SOLUTION: Performed by: INTERNAL MEDICINE

## 2018-12-10 RX ADMIN — REGADENOSON 0.4 MG: 0.08 INJECTION, SOLUTION INTRAVENOUS at 10:23

## 2018-12-10 RX ADMIN — TECHNETIUM TC 99M SESTAMIBI 1 DOSE: 1 INJECTION INTRAVENOUS at 10:23

## 2018-12-11 LAB
BH CV NUCLEAR PRIOR STUDY: 3
BH CV STRESS COMMENTS STAGE 1: NORMAL
BH CV STRESS DOSE REGADENOSON STAGE 1: 0.4
BH CV STRESS DURATION MIN STAGE 1: 0
BH CV STRESS DURATION SEC STAGE 1: 10
BH CV STRESS PROTOCOL 1: NORMAL
BH CV STRESS RECOVERY BP: NORMAL MMHG
BH CV STRESS RECOVERY HR: 58 BPM
BH CV STRESS STAGE 1: 1
MAXIMAL PREDICTED HEART RATE: 155 BPM
PERCENT MAX PREDICTED HR: 38.06 %
STRESS BASELINE BP: NORMAL MMHG
STRESS BASELINE HR: 51 BPM
STRESS PERCENT HR: 45 %
STRESS POST PEAK BP: NORMAL MMHG
STRESS POST PEAK HR: 59 BPM
STRESS TARGET HR: 132 BPM

## 2018-12-12 ENCOUNTER — TELEPHONE (OUTPATIENT)
Dept: CARDIOLOGY | Facility: CLINIC | Age: 63
End: 2018-12-12

## 2018-12-12 LAB
BH CV ECHO MEAS - ACS: 2.2 CM
BH CV ECHO MEAS - AO MAX PG: 6.5 MMHG
BH CV ECHO MEAS - AO MEAN PG: 2.9 MMHG
BH CV ECHO MEAS - AO ROOT AREA (BSA CORRECTED): 1.8
BH CV ECHO MEAS - AO ROOT AREA: 11.2 CM^2
BH CV ECHO MEAS - AO ROOT DIAM: 3.8 CM
BH CV ECHO MEAS - AO V2 MAX: 127.6 CM/SEC
BH CV ECHO MEAS - AO V2 MEAN: 78.2 CM/SEC
BH CV ECHO MEAS - AO V2 VTI: 26.5 CM
BH CV ECHO MEAS - BSA(HAYCOCK): 2.1 M^2
BH CV ECHO MEAS - BSA: 2 M^2
BH CV ECHO MEAS - BZI_BMI: 28.8 KILOGRAMS/M^2
BH CV ECHO MEAS - BZI_METRIC_HEIGHT: 175.3 CM
BH CV ECHO MEAS - BZI_METRIC_WEIGHT: 88.5 KG
BH CV ECHO MEAS - EDV(CUBED): 227.9 ML
BH CV ECHO MEAS - EDV(TEICH): 187.5 ML
BH CV ECHO MEAS - EF(CUBED): 54.1 %
BH CV ECHO MEAS - EF(TEICH): 45.1 %
BH CV ECHO MEAS - ESV(CUBED): 104.5 ML
BH CV ECHO MEAS - ESV(TEICH): 102.9 ML
BH CV ECHO MEAS - FS: 22.9 %
BH CV ECHO MEAS - IVS/LVPW: 0.95
BH CV ECHO MEAS - IVSD: 1.1 CM
BH CV ECHO MEAS - LA DIMENSION: 4.7 CM
BH CV ECHO MEAS - LA/AO: 1.2
BH CV ECHO MEAS - LV IVRT: 0.14 SEC
BH CV ECHO MEAS - LV MASS(C)D: 290.2 GRAMS
BH CV ECHO MEAS - LV MASS(C)DI: 142 GRAMS/M^2
BH CV ECHO MEAS - LVIDD: 6.1 CM
BH CV ECHO MEAS - LVIDS: 4.7 CM
BH CV ECHO MEAS - LVPWD: 1.1 CM
BH CV ECHO MEAS - MITRAL HR: 83.4 BPM
BH CV ECHO MEAS - MITRAL R-R: 0.72 SEC
BH CV ECHO MEAS - MV A MAX VEL: 55.4 CM/SEC
BH CV ECHO MEAS - MV DEC SLOPE: 91.5 CM/SEC^2
BH CV ECHO MEAS - MV DEC TIME: 0.5 SEC
BH CV ECHO MEAS - MV E MAX VEL: 45.8 CM/SEC
BH CV ECHO MEAS - MV E/A: 0.83
BH CV ECHO MEAS - MV MAX PG: 2 MMHG
BH CV ECHO MEAS - MV MEAN PG: 0.55 MMHG
BH CV ECHO MEAS - MV V2 MAX: 70.7 CM/SEC
BH CV ECHO MEAS - MV V2 MEAN: 34.6 CM/SEC
BH CV ECHO MEAS - MV V2 VTI: 24.5 CM
BH CV ECHO MEAS - PA MAX PG: 4.6 MMHG
BH CV ECHO MEAS - PA MEAN PG: 2.4 MMHG
BH CV ECHO MEAS - PA V2 MAX: 107.2 CM/SEC
BH CV ECHO MEAS - PA V2 MEAN: 71.7 CM/SEC
BH CV ECHO MEAS - PA V2 VTI: 25.8 CM
BH CV ECHO MEAS - PULM. HR: 166.7 BPM
BH CV ECHO MEAS - PULM. R-R: 0.36 SEC
BH CV ECHO MEAS - RAP SYSTOLE: 10 MMHG
BH CV ECHO MEAS - RVDD: 2.6 CM
BH CV ECHO MEAS - RVSP: 27.8 MMHG
BH CV ECHO MEAS - SI(AO): 145.3 ML/M^2
BH CV ECHO MEAS - SI(CUBED): 60.4 ML/M^2
BH CV ECHO MEAS - SI(TEICH): 41.4 ML/M^2
BH CV ECHO MEAS - SV(AO): 296.9 ML
BH CV ECHO MEAS - SV(CUBED): 123.4 ML
BH CV ECHO MEAS - SV(TEICH): 84.6 ML
BH CV ECHO MEAS - TR MAX VEL: 209.7 CM/SEC
MAXIMAL PREDICTED HEART RATE: 155 BPM
STRESS TARGET HR: 132 BPM

## 2018-12-12 NOTE — TELEPHONE ENCOUNTER
Stress:  #1.  Apical and inferoapical ischemia.     #2.  Depressed poststress ejection fraction of 44% with no focal wall motion abnormalities.     #3.  No evidence of transient ischemic dilation or increased lung uptake.      Per Tess, have pt come in at 3:30 tomorrow.

## 2018-12-12 NOTE — TELEPHONE ENCOUNTER
Informed pt of his results and asked if he could come in tomorrow at 3:30, he stated that he would prefer next week. I informed him that Tess doesn't actually have any openings, she is making time to see pt to discuss his results. He stated he would be here tomorrow at 3:30 for appt.

## 2018-12-12 NOTE — TELEPHONE ENCOUNTER
----- Message from EARLINE Armenta sent at 12/12/2018  7:20 AM EST -----  We will get in either today or tomorrow.  We will discuss

## 2018-12-19 ENCOUNTER — OFFICE VISIT (OUTPATIENT)
Dept: CARDIOLOGY | Facility: CLINIC | Age: 63
End: 2018-12-19

## 2018-12-19 VITALS
SYSTOLIC BLOOD PRESSURE: 122 MMHG | BODY MASS INDEX: 30.51 KG/M2 | HEART RATE: 63 BPM | WEIGHT: 206 LBS | HEIGHT: 69 IN | DIASTOLIC BLOOD PRESSURE: 77 MMHG | OXYGEN SATURATION: 98 %

## 2018-12-19 DIAGNOSIS — R07.2 PRECORDIAL PAIN: ICD-10-CM

## 2018-12-19 DIAGNOSIS — F41.9 ANXIETY: ICD-10-CM

## 2018-12-19 DIAGNOSIS — I95.1 ORTHOSTATIC HYPOTENSION: Chronic | ICD-10-CM

## 2018-12-19 DIAGNOSIS — R94.39 ABNORMAL CARDIOVASCULAR STRESS TEST: ICD-10-CM

## 2018-12-19 DIAGNOSIS — I25.5 ISCHEMIC CARDIOMYOPATHY: Chronic | ICD-10-CM

## 2018-12-19 DIAGNOSIS — J06.9 UPPER RESPIRATORY TRACT INFECTION, UNSPECIFIED TYPE: ICD-10-CM

## 2018-12-19 DIAGNOSIS — I25.709 CORONARY ARTERY DISEASE INVOLVING CORONARY BYPASS GRAFT OF NATIVE HEART WITH ANGINA PECTORIS (HCC): Primary | ICD-10-CM

## 2018-12-19 DIAGNOSIS — E78.5 DYSLIPIDEMIA: Chronic | ICD-10-CM

## 2018-12-19 DIAGNOSIS — F17.200 CURRENT EVERY DAY SMOKER: Chronic | ICD-10-CM

## 2018-12-19 DIAGNOSIS — I10 ESSENTIAL HYPERTENSION: Chronic | ICD-10-CM

## 2018-12-19 PROCEDURE — 99214 OFFICE O/P EST MOD 30 MIN: CPT | Performed by: NURSE PRACTITIONER

## 2018-12-19 RX ORDER — BUSPIRONE HYDROCHLORIDE 5 MG/1
5 TABLET ORAL 3 TIMES DAILY PRN
Qty: 30 TABLET | Refills: 5 | Status: SHIPPED | OUTPATIENT
Start: 2018-12-19 | End: 2019-07-01

## 2018-12-19 RX ORDER — AMOXICILLIN AND CLAVULANATE POTASSIUM 875; 125 MG/1; MG/1
1 TABLET, FILM COATED ORAL 2 TIMES DAILY
Qty: 14 TABLET | Refills: 0 | Status: SHIPPED | OUTPATIENT
Start: 2018-12-19 | End: 2019-06-12

## 2018-12-19 NOTE — PATIENT INSTRUCTIONS
Steps to Quit Smoking  Smoking tobacco can be bad for your health. It can also affect almost every organ in your body. Smoking puts you and people around you at risk for many serious long-lasting (chronic) diseases. Quitting smoking is hard, but it is one of the best things that you can do for your health. It is never too late to quit.  What are the benefits of quitting smoking?  When you quit smoking, you lower your risk for getting serious diseases and conditions. They can include:  · Lung cancer or lung disease.  · Heart disease.  · Stroke.  · Heart attack.  · Not being able to have children (infertility).  · Weak bones (osteoporosis) and broken bones (fractures).    If you have coughing, wheezing, and shortness of breath, those symptoms may get better when you quit. You may also get sick less often. If you are pregnant, quitting smoking can help to lower your chances of having a baby of low birth weight.  What can I do to help me quit smoking?  Talk with your doctor about what can help you quit smoking. Some things you can do (strategies) include:  · Quitting smoking totally, instead of slowly cutting back how much you smoke over a period of time.  · Going to in-person counseling. You are more likely to quit if you go to many counseling sessions.  · Using resources and support systems, such as:  ? Online chats with a counselor.  ? Phone quitlines.  ? Printed self-help materials.  ? Support groups or group counseling.  ? Text messaging programs.  ? Mobile phone apps or applications.  · Taking medicines. Some of these medicines may have nicotine in them. If you are pregnant or breastfeeding, do not take any medicines to quit smoking unless your doctor says it is okay. Talk with your doctor about counseling or other things that can help you.    Talk with your doctor about using more than one strategy at the same time, such as taking medicines while you are also going to in-person counseling. This can help make  quitting easier.  What things can I do to make it easier to quit?  Quitting smoking might feel very hard at first, but there is a lot that you can do to make it easier. Take these steps:  · Talk to your family and friends. Ask them to support and encourage you.  · Call phone quitlines, reach out to support groups, or work with a counselor.  · Ask people who smoke to not smoke around you.  · Avoid places that make you want (trigger) to smoke, such as:  ? Bars.  ? Parties.  ? Smoke-break areas at work.  · Spend time with people who do not smoke.  · Lower the stress in your life. Stress can make you want to smoke. Try these things to help your stress:  ? Getting regular exercise.  ? Deep-breathing exercises.  ? Yoga.  ? Meditating.  ? Doing a body scan. To do this, close your eyes, focus on one area of your body at a time from head to toe, and notice which parts of your body are tense. Try to relax the muscles in those areas.  · Download or buy apps on your mobile phone or tablet that can help you stick to your quit plan. There are many free apps, such as QuitGuide from the CDC (Centers for Disease Control and Prevention). You can find more support from smokefree.gov and other websites.    This information is not intended to replace advice given to you by your health care provider. Make sure you discuss any questions you have with your health care provider.  Document Released: 10/14/2010 Document Revised: 08/15/2017 Document Reviewed: 05/03/2016  [employer] Interactive Patient Education © 2018 [employer]  Fat and Cholesterol Restricted Diet  Getting too much fat and cholesterol in your diet may cause health problems. Following this diet helps keep your fat and cholesterol at normal levels. This can keep you from getting sick.  What types of fat should I choose?  · Choose monosaturated and polyunsaturated fats. These are found in foods such as olive oil, canola oil, flaxseeds, walnuts, almonds, and seeds.  · Eat more  "omega-3 fats. Good choices include salmon, mackerel, sardines, tuna, flaxseed oil, and ground flaxseeds.  · Limit saturated fats. These are in animal products such as meats, butter, and cream. They can also be in plant products such as palm oil, palm kernel oil, and coconut oil.  · Avoid foods with partially hydrogenated oils in them. These contain trans fats. Examples of foods that have trans fats are stick margarine, some tub margarines, cookies, crackers, and other baked goods.  What general guidelines do I need to follow?  · Check food labels. Look for the words \"trans fat\" and \"saturated fat.\"  · When preparing a meal:  ? Fill half of your plate with vegetables and green salads.  ? Fill one fourth of your plate with whole grains. Look for the word \"whole\" as the first word in the ingredient list.  ? Fill one fourth of your plate with lean protein foods.  · Eat more foods that have fiber, like apples, carrots, beans, peas, and barley.  · Eat more home-cooked foods. Eat less at restaurants and buffets.  · Limit or avoid alcohol.  · Limit foods high in starch and sugar.  · Limit fried foods.  · Cook foods without frying them. Baking, boiling, grilling, and broiling are all great options.  · Lose weight if you are overweight. Losing even a small amount of weight can help your overall health. It can also help prevent diseases such as diabetes and heart disease.  What foods can I eat?  Grains  Whole grains, such as whole wheat or whole grain breads, crackers, cereals, and pasta. Unsweetened oatmeal, bulgur, barley, quinoa, or brown rice. Corn or whole wheat flour tortillas.  Vegetables  Fresh or frozen vegetables (raw, steamed, roasted, or grilled). Green salads.  Fruits  All fresh, canned (in natural juice), or frozen fruits.  Meat and Other Protein Products  Ground beef (85% or leaner), grass-fed beef, or beef trimmed of fat. Skinless chicken or turkey. Ground chicken or turkey. Pork trimmed of fat. All fish and " seafood. Eggs. Dried beans, peas, or lentils. Unsalted nuts or seeds. Unsalted canned or dry beans.  Dairy  Low-fat dairy products, such as skim or 1% milk, 2% or reduced-fat cheeses, low-fat ricotta or cottage cheese, or plain low-fat yogurt.  Fats and Oils  Tub margarines without trans fats. Light or reduced-fat mayonnaise and salad dressings. Avocado. Olive, canola, sesame, or safflower oils. Natural peanut or almond butter (choose ones without added sugar and oil).  The items listed above may not be a complete list of recommended foods or beverages. Contact your dietitian for more options.  What foods are not recommended?  Grains  White bread. White pasta. White rice. Cornbread. Bagels, pastries, and croissants. Crackers that contain trans fat.  Vegetables  White potatoes. Corn. Creamed or fried vegetables. Vegetables in a cheese sauce.  Fruits  Dried fruits. Canned fruit in light or heavy syrup. Fruit juice.  Meat and Other Protein Products  Fatty cuts of meat. Ribs, chicken wings, ruelas, sausage, bologna, salami, chitterlings, fatback, hot dogs, bratwurst, and packaged luncheon meats. Liver and organ meats.  Dairy  Whole or 2% milk, cream, half-and-half, and cream cheese. Whole milk cheeses. Whole-fat or sweetened yogurt. Full-fat cheeses. Nondairy creamers and whipped toppings. Processed cheese, cheese spreads, or cheese curds.  Sweets and Desserts  Corn syrup, sugars, honey, and molasses. Candy. Jam and jelly. Syrup. Sweetened cereals. Cookies, pies, cakes, donuts, muffins, and ice cream.  Fats and Oils  Butter, stick margarine, lard, shortening, ghee, or ruelas fat. Coconut, palm kernel, or palm oils.  Beverages  Alcohol. Sweetened drinks (such as sodas, lemonade, and fruit drinks or punches).  The items listed above may not be a complete list of foods and beverages to avoid. Contact your dietitian for more information.  This information is not intended to replace advice given to you by your health care  provider. Make sure you discuss any questions you have with your health care provider.  Document Released: 06/18/2013 Document Revised: 08/24/2017 Document Reviewed: 03/19/2015  [employer] Interactive Patient Education © 2018 [employer]  BMI for Adults  Body mass index (BMI) is a number that is calculated from a person's weight and height. In most adults, the number is used to find how much of an adult's weight is made up of fat. BMI is not as accurate as a direct measure of body fat.  How is BMI calculated?  BMI is calculated by dividing weight in kilograms by height in meters squared. It can also be calculated by dividing weight in pounds by height in inches squared, then multiplying the resulting number by 703. Charts are available to help you find your BMI quickly and easily without doing this calculation.  How is BMI interpreted?  Health care professionals use BMI charts to identify whether an adult is underweight, at a normal weight, or overweight based on the following guidelines:  · Underweight: BMI less than 18.5.  · Normal weight: BMI between 18.5 and 24.9.  · Overweight: BMI between 25 and 29.9.  · Obese: BMI of 30 and above.    BMI is usually interpreted the same for males and females.  Weight includes both fat and muscle, so someone with a muscular build, such as an athlete, may have a BMI that is higher than 24.9. In cases like these, BMI may not accurately depict body fat. To determine if excess body fat is the cause of a BMI of 25 or higher, further assessments may need to be done by a health care provider.  Why is BMI a useful tool?  BMI is used to identify a possible weight problem that may be related to a medical problem or may increase the risk for medical problems. BMI can also be used to promote changes to reach a healthy weight.  This information is not intended to replace advice given to you by your health care provider. Make sure you discuss any questions you have with your health care  provider.  Document Released: 08/29/2005 Document Revised: 04/27/2017 Document Reviewed: 05/15/2015  [employer] Interactive Patient Education © 2018 [employer]    Living With Anxiety  After being diagnosed with an anxiety disorder, you may be relieved to know why you have felt or behaved a certain way. It is natural to also feel overwhelmed about the treatment ahead and what it will mean for your life. With care and support, you can manage this condition and recover from it.  How to cope with anxiety  Dealing with stress  Stress is your body’s reaction to life changes and events, both good and bad. Stress can last just a few hours or it can be ongoing. Stress can play a major role in anxiety, so it is important to learn both how to cope with stress and how to think about it differently.  Talk with your health care provider or a counselor to learn more about stress reduction. He or she may suggest some stress reduction techniques, such as:  · Music therapy. This can include creating or listening to music that you enjoy and that inspires you.  · Mindfulness-based meditation. This involves being aware of your normal breaths, rather than trying to control your breathing. It can be done while sitting or walking.  · Centering prayer. This is a kind of meditation that involves focusing on a word, phrase, or sacred image that is meaningful to you and that brings you peace.  · Deep breathing. To do this, expand your stomach and inhale slowly through your nose. Hold your breath for 3-5 seconds. Then exhale slowly, allowing your stomach muscles to relax.  · Self-talk. This is a skill where you identify thought patterns that lead to anxiety reactions and correct those thoughts.  · Muscle relaxation. This involves tensing muscles then relaxing them.    Choose a stress reduction technique that fits your lifestyle and personality. Stress reduction techniques take time and practice. Set aside 5-15 minutes a day to do them.  Therapists can offer training in these techniques. The training may be covered by some insurance plans. Other things you can do to manage stress include:  · Keeping a stress diary. This can help you learn what triggers your stress and ways to control your response.  · Thinking about how you respond to certain situations. You may not be able to control everything, but you can control your reaction.  · Making time for activities that help you relax, and not feeling guilty about spending your time in this way.    Therapy combined with coping and stress-reduction skills provides the best chance for successful treatment.  Medicines  Medicines can help ease symptoms. Medicines for anxiety include:  · Anti-anxiety drugs.  · Antidepressants.  · Beta-blockers.    Medicines may be used as the main treatment for anxiety disorder, along with therapy, or if other treatments are not working. Medicines should be prescribed by a health care provider.  Relationships  Relationships can play a big part in helping you recover. Try to spend more time connecting with trusted friends and family members. Consider going to couples counseling, taking family education classes, or going to family therapy. Therapy can help you and others better understand the condition.  How to recognize changes in your condition  Everyone has a different response to treatment for anxiety. Recovery from anxiety happens when symptoms decrease and stop interfering with your daily activities at home or work. This may mean that you will start to:  · Have better concentration and focus.  · Sleep better.  · Be less irritable.  · Have more energy.  · Have improved memory.    It is important to recognize when your condition is getting worse. Contact your health care provider if your symptoms interfere with home or work and you do not feel like your condition is improving.  Where to find help and support:  You can get help and support from these sources:  · Self-help  groups.  · Online and community organizations.  · A trusted spiritual leader.  · Couples counseling.  · Family education classes.  · Family therapy.    Follow these instructions at home:  · Eat a healthy diet that includes plenty of vegetables, fruits, whole grains, low-fat dairy products, and lean protein. Do not eat a lot of foods that are high in solid fats, added sugars, or salt.  · Exercise. Most adults should do the following:  ? Exercise for at least 150 minutes each week. The exercise should increase your heart rate and make you sweat (moderate-intensity exercise).  ? Strengthening exercises at least twice a week.  · Cut down on caffeine, tobacco, alcohol, and other potentially harmful substances.  · Get the right amount and quality of sleep. Most adults need 7-9 hours of sleep each night.  · Make choices that simplify your life.  · Take over-the-counter and prescription medicines only as told by your health care provider.  · Avoid caffeine, alcohol, and certain over-the-counter cold medicines. These may make you feel worse. Ask your pharmacist which medicines to avoid.  · Keep all follow-up visits as told by your health care provider. This is important.  Questions to ask your health care provider  · Would I benefit from therapy?  · How often should I follow up with a health care provider?  · How long do I need to take medicine?  · Are there any long-term side effects of my medicine?  · Are there any alternatives to taking medicine?  Contact a health care provider if:  · You have a hard time staying focused or finishing daily tasks.  · You spend many hours a day feeling worried about everyday life.  · You become exhausted by worry.  · You start to have headaches, feel tense, or have nausea.  · You urinate more than normal.  · You have diarrhea.  Get help right away if:  · You have a racing heart and shortness of breath.  · You have thoughts of hurting yourself or others.  If you ever feel like you may hurt  yourself or others, or have thoughts about taking your own life, get help right away. You can go to your nearest emergency department or call:  · Your local emergency services (911 in the [place]  · A suicide crisis helpline, such as the National Suicide Prevention Lifeline at 1-602.221.8008. This is open 24-hours a day.    Summary  · Taking steps to deal with stress can help calm you.  · Medicines cannot cure anxiety disorders, but they can help ease symptoms.  · Family, friends, and partners can play a big part in helping you recover from an anxiety disorder.  This information is not intended to replace advice given to you by your health care provider. Make sure you discuss any questions you have with your health care provider.  Document Released: 12/12/2017 Document Revised: 12/12/2017 Document Reviewed: 12/12/2017  [employer] Interactive Patient Education © 2018 [employer]

## 2018-12-19 NOTE — PROGRESS NOTES
Subjective   Darnell Abarca is a 63 y.o. male     Chief Complaint   Patient presents with   • Follow-up     patient here today for follow-up on stress test   • Hypertension   • Hyperlipidemia       HPI    Problem List:    1. CAD  1.1 CABG @ [facility] distant past  1.2 [facility] 6/2015 - patient grafts with disease in the native vessels with medical management recommendation; EF 40%  1.3 stress test 12/10/18-apical and inferoapical ischemia, depressed post stress EF 44%  2. Ischemic Cardiomyopathy   2.1 Echo 8/1/2014 - EF 50-55%; DD I; mild LVH; mild TR; trace DE; PA 30-35  2.2 Echo 4/20/17-mild LVH, EF 45-50%, diastolic dysfunction 1, mild MR, mild TR, PA 30-35, dilation of aortic root  2.3 Echo 12/10/18-EF 50-55%, mild LVH, diastolic dysfunction 1, mild dilated aortic root 3.8 trace to mild TR, trace to mild MR  3. CHF; class II-III symptoms   4. Dyslipidemia   5. Orthostatic hypotension  6. COPD  7. History of throat CA  8. Event Monitor 5/1-5/14/17 - SB - NSR with PVC  9. Carotid Artery Disease   9.1 Carotid Artery US 5/8/17 - AMALIA 16-49%; 30th 50%; = or < 50% left common carotid artery; antegrade flow both vertebral arteries   10. Smoking Habituation     Patient is a 63-year-old male who presents today for follow-up with his daughter and family member at his side.  He does get chest tightness after he gets tense and stressed out.  He says he will then start to smother.  He says he is unsure if it's from his neck or from his heart.  He says he's only had one episode since he was here last and that was when he went to get his hair cut.  He did not take any nitroglycerin as it did not last long.  He says he will get fluttering when he gets stressed out.  He says he gets dizzy all the time with position change as well as lightheaded.  He denies any presyncope, syncope, orthopnea or PND.  He does get some swelling in his legs.  He said he missed his appointment the other day because he was in snf.  He said he had a sleep on the  concrete and that's when his legs are swelling.  He's had a upper respiratory infection for about a week now.  He still smokes more than a half a pack a day due to stress.  He is short of breath with any activity and at times even without activity.    We went over stress and echo.    Current Outpatient Medications   Medication Sig Dispense Refill   • atorvastatin (LIPITOR) 80 MG tablet Take 1 tablet by mouth Every Night. 30 tablet 11   • clopidogrel (PLAVIX) 75 MG tablet Take 1 tablet by mouth Daily. 30 tablet 11   • fludrocortisone 0.1 MG tablet Take 1 tablet by mouth Daily. 1 tab by mouth on Monday,Wednesday, Friday, Sunday 16 tablet 5   • gabapentin (NEURONTIN) 100 MG capsule Take 1 capsule by mouth 3 (Three) Times a Day.     • magnesium oxide (MAGOX) 400 (241.3 Mg) MG tablet tablet Take 1 tablet by mouth 2 (Two) Times a Day. 60 each 3   • nitroglycerin (NITROSTAT) 0.4 MG SL tablet Place  under the tongue.     • omeprazole (PRILOSEC) 20 MG capsule Take 1 capsule by mouth daily.     • oxyCODONE-acetaminophen (PERCOCET)  MG per tablet Take 1 tablet by mouth Every 8 (Eight) Hours As Needed.     • potassium chloride (K-DUR,KLOR-CON) 20 MEQ CR tablet Take 1 tablet by mouth 2 (Two) Times a Day. 60 tablet 3   • amoxicillin-clavulanate (AUGMENTIN) 875-125 MG per tablet Take 1 tablet by mouth 2 (Two) Times a Day. 14 tablet 0   • busPIRone (BUSPAR) 5 MG tablet Take 1 tablet by mouth 3 (Three) Times a Day As Needed (anxiety). 30 tablet 5     No current facility-administered medications for this visit.        ALLERGIES    Aspirin; Codeine; Ethanolamine; Nsaids; Other; Salicylates; Theophylline; and Triprolidine-pseudoephedrine    Past Medical History:   Diagnosis Date   • CAD (coronary artery disease)    • COPD (chronic obstructive pulmonary disease) (CMS/[facility])    • Current every day smoker     1.5-2 PPD   • Dizziness    • Dyslipidemia    • Edema    • History of throat cancer    • Hyperlipidemia    • Hypertension    •  Ischemic cardiomyopathy    • Lightheaded    • Myocardial infarction (CMS/HCC)    • Orthostatic hypotension    • SOB (shortness of breath)    • Stroke (CMS/HCC)        Social History     Socioeconomic History   • Marital status:      Spouse name: Not on file   • Number of children: Not on file   • Years of education: Not on file   • Highest education level: Not on file   Social Needs   • Financial resource strain: Not on file   • Food insecurity - worry: Not on file   • Food insecurity - inability: Not on file   • Transportation needs - medical: Not on file   • Transportation needs - non-medical: Not on file   Occupational History   • Not on file   Tobacco Use   • Smoking status: Current Every Day Smoker     Packs/day: 2.00     Types: Cigarettes   • Smokeless tobacco: Never Used   Substance and Sexual Activity   • Alcohol use: Yes     Comment: occasionally   • Drug use: No   • Sexual activity: Defer   Other Topics Concern   • Not on file   Social History Narrative   • Not on file       Family History   Problem Relation Age of Onset   • Diabetes Mother    • Hypertension Mother    • Hypertension Father    • Hyperlipidemia Sister         Familial hypercholesterolemia   • Hypertension Sister    • Hyperlipidemia Brother         Familial hypercholesterolemia   • Heart attack Brother         Acute MI   • Stroke Brother         CVA   • Hypertension Brother    • Heart disease Brother         pacemaker placement   • Other Brother         Pacemaker placement       Review of Systems   Constitutional: Positive for fatigue. Negative for diaphoresis.   HENT: Positive for congestion, hearing loss ([place]), postnasal drip and rhinorrhea. Negative for sore throat.    Eyes: Positive for visual disturbance (glasses daily ).   Respiratory: Positive for cough (productive- green phlem ), chest tightness (sometimes) and shortness of breath (all the time, even w/o activity ).    Cardiovascular: Positive for chest pain (tightness after  "he gets tense he will start smothering; unsure if due to his neck; just one time yesterday when going to get a haircut; no nitro ), palpitations (fluttering, when he gets stressed out) and leg swelling (bilateral edema ).   Gastrointestinal: Positive for nausea. Negative for abdominal pain, blood in stool, constipation, diarrhea and vomiting.   Endocrine: Positive for cold intolerance. Negative for heat intolerance.   Genitourinary: Negative for difficulty urinating, dysuria, frequency, hematuria and urgency.        Daughter states pt doesn't urinate very often, but kidney doctor says he's okay    Musculoskeletal: Positive for arthralgias, back pain, neck pain and neck stiffness (States sometimes he can't move his neck well, feels it's caused by stress; gets real tense ).   Skin: Negative for rash and wound.   Allergic/Immunologic: Negative for environmental allergies and food allergies.   Neurological: Positive for dizziness (\"all the time\" mainly w/ position changes ), weakness (generalized ) and light-headedness (w/ position changes ). Negative for syncope, numbness and headaches.   Hematological: Bruises/bleeds easily (bruises).   Psychiatric/Behavioral: Negative for sleep disturbance.       Objective   /77 (BP Location: Left arm, Patient Position: Sitting)   Pulse 63   Ht 175 cm (68.9\")   Wt 93.4 kg (206 lb)   SpO2 98%   BMI 30.51 kg/m²   Vitals:    12/19/18 0931   BP: 122/77   BP Location: Left arm   Patient Position: Sitting   Pulse: 63   SpO2: 98%   Weight: 93.4 kg (206 lb)   Height: 175 cm (68.9\")      Lab Results (most recent)     None        Physical Exam   Constitutional: He is oriented to person, place, and time. Vital signs are normal. He appears well-developed and well-nourished. He is active and cooperative.   HENT:   Head: Normocephalic.   Mouth/Throat: Abnormal dentition (edentulous ).   Eyes: Lids are normal.   Wears glasses    Neck: Normal carotid pulses, no hepatojugular reflux and no " JVD present. Carotid bruit is not present.   Cardiovascular: Normal rate, regular rhythm and normal heart sounds.   Pulses:       Radial pulses are 2+ on the right side, and 2+ on the left side.        Dorsalis pedis pulses are 2+ on the right side, and 2+ on the left side.        Posterior tibial pulses are 2+ on the right side, and 2+ on the left side.   No edema BLE.    Pulmonary/Chest: Effort normal. He has wheezes (expiratory ) in the right middle field, the right lower field and the left middle field.   Coarse throughout    Abdominal: Normal appearance and bowel sounds are normal.   Neurological: He is alert and oriented to person, place, and time.   Skin: Skin is warm, dry and intact.   Psychiatric: His speech is normal and behavior is normal. Judgment and thought content normal. His mood appears anxious. Cognition and memory are normal.       Procedure   Procedures         Assessment/Plan      Diagnosis Plan   1. Coronary artery disease involving coronary bypass graft of native heart with angina pectoris (CMS/HCC)     2. Essential hypertension     3. Ischemic cardiomyopathy     4. Orthostatic hypotension     5. Dyslipidemia     6. Current every day smoker     7. Upper respiratory tract infection, unspecified type  amoxicillin-clavulanate (AUGMENTIN) 875-125 MG per tablet   8. Abnormal cardiovascular stress test     9. Anxiety  busPIRone (BUSPAR) 5 MG tablet   10. Precordial pain         Return in about 8 weeks (around 2/13/2019).    CAD-patient is on statin and Plavix.  Hypertension-doing very well.  Ischemic cardiomyopathy-improved EF.  Orthostatic hypotension-patient is on Florinef.  Dyslipidemia-patient is on Lipitor.  URI-send in Augmentin.  Anxiety-patient will try BuSpar.  Chest pain-we will wait and see how he does with the BuSpar if this helps his symptoms we will leave him alone however if he continues to have chest pain then we will proceed with left heart catheter.  Lopid 8 weeks or sooner if any  changes.       I advised Darnell of the risks of continuing to use tobacco, and I provided him with tobacco cessation educational materials in the After Visit Summary.     During this visit, I spent >3 minutes counseling the patient regarding tobacco cessation.    Patient's Body mass index is 30.51 kg/m². BMI is above normal parameters. Recommendations include: educational material.      Electronically signed by:

## 2019-03-19 ENCOUNTER — TELEPHONE (OUTPATIENT)
Dept: CARDIOLOGY | Facility: CLINIC | Age: 64
End: 2019-03-19

## 2019-03-19 DIAGNOSIS — E78.5 HYPERLIPIDEMIA, UNSPECIFIED HYPERLIPIDEMIA TYPE: ICD-10-CM

## 2019-03-19 DIAGNOSIS — I95.1 ORTHOSTATIC HYPOTENSION: Chronic | ICD-10-CM

## 2019-03-19 DIAGNOSIS — I25.810 CORONARY ARTERY DISEASE INVOLVING CORONARY BYPASS GRAFT OF NATIVE HEART WITHOUT ANGINA PECTORIS: ICD-10-CM

## 2019-03-19 RX ORDER — FLUDROCORTISONE ACETATE 0.1 MG/1
0.1 TABLET ORAL DAILY
Qty: 16 TABLET | Refills: 5 | Status: SHIPPED | OUTPATIENT
Start: 2019-03-19 | End: 2019-08-28 | Stop reason: SDUPTHER

## 2019-03-19 RX ORDER — CLOPIDOGREL BISULFATE 75 MG/1
75 TABLET ORAL DAILY
Qty: 30 TABLET | Refills: 11 | Status: SHIPPED | OUTPATIENT
Start: 2019-03-19 | End: 2019-12-16 | Stop reason: SDUPTHER

## 2019-03-19 RX ORDER — ATORVASTATIN CALCIUM 80 MG/1
80 TABLET, FILM COATED ORAL NIGHTLY
Qty: 30 TABLET | Refills: 11 | Status: SHIPPED | OUTPATIENT
Start: 2019-03-19 | End: 2019-12-16 | Stop reason: SDUPTHER

## 2019-03-19 NOTE — TELEPHONE ENCOUNTER
----- Message from Jenny Boyce sent at 3/19/2019 10:43 AM EDT -----  PT NEEDS atorvastatin (LIPITOR) 80 MG tablet, clopidogrel (PLAVIX) 75 MG tablet & fludrocortisone 0.1 MG tablet CALLED INTO HIS PHARMACY PLEASE

## 2019-06-12 ENCOUNTER — OFFICE VISIT (OUTPATIENT)
Dept: CARDIOLOGY | Facility: CLINIC | Age: 64
End: 2019-06-12

## 2019-06-12 VITALS
DIASTOLIC BLOOD PRESSURE: 64 MMHG | BODY MASS INDEX: 29.62 KG/M2 | OXYGEN SATURATION: 98 % | HEART RATE: 68 BPM | SYSTOLIC BLOOD PRESSURE: 93 MMHG | WEIGHT: 200 LBS | HEIGHT: 69 IN

## 2019-06-12 DIAGNOSIS — F17.200 CURRENT EVERY DAY SMOKER: Chronic | ICD-10-CM

## 2019-06-12 DIAGNOSIS — J06.9 UPPER RESPIRATORY TRACT INFECTION, UNSPECIFIED TYPE: ICD-10-CM

## 2019-06-12 DIAGNOSIS — R00.2 PALPITATIONS: ICD-10-CM

## 2019-06-12 DIAGNOSIS — R06.02 SHORTNESS OF BREATH: ICD-10-CM

## 2019-06-12 DIAGNOSIS — I25.5 ISCHEMIC CARDIOMYOPATHY: Chronic | ICD-10-CM

## 2019-06-12 DIAGNOSIS — I10 ESSENTIAL HYPERTENSION: Chronic | ICD-10-CM

## 2019-06-12 DIAGNOSIS — I25.709 CORONARY ARTERY DISEASE INVOLVING CORONARY BYPASS GRAFT OF NATIVE HEART WITH ANGINA PECTORIS (HCC): Primary | ICD-10-CM

## 2019-06-12 DIAGNOSIS — I95.1 ORTHOSTATIC HYPOTENSION: Chronic | ICD-10-CM

## 2019-06-12 DIAGNOSIS — E78.5 DYSLIPIDEMIA: Chronic | ICD-10-CM

## 2019-06-12 PROCEDURE — 99214 OFFICE O/P EST MOD 30 MIN: CPT | Performed by: NURSE PRACTITIONER

## 2019-06-12 RX ORDER — CEFDINIR 300 MG/1
300 CAPSULE ORAL DAILY
Qty: 20 CAPSULE | Refills: 0 | Status: SHIPPED | OUTPATIENT
Start: 2019-06-12 | End: 2019-07-01

## 2019-06-12 NOTE — PROGRESS NOTES
Subjective   Darnell Abarca is a 64 y.o. male     Chief Complaint   Patient presents with   • Follow-up   • Coronary Artery Disease       HPI    Problem List:    1. CAD  1.1 CABG @ [facility] distant past  1.2 [facility] 6/2015 - patient grafts with disease in the native vessels with medical management recommendation; EF 40%  1.3 stress test 12/10/18-apical and inferoapical ischemia, depressed post stress EF 44%  2. Ischemic Cardiomyopathy   2.1 Echo 8/1/2014 - EF 50-55%; DD I; mild LVH; mild TR; trace MN; PA 30-35  2.2 Echo 4/20/17-mild LVH, EF 45-50%, diastolic dysfunction 1, mild MR, mild TR, PA 30-35, dilation of aortic root  2.3 Echo 12/10/18-EF 50-55%, mild LVH, diastolic dysfunction 1, mild dilated aortic root 3.8 trace to mild TR, trace to mild MR  3. CHF; class II-III symptoms   4. Dyslipidemia   5. Orthostatic hypotension  6. COPD  7. History of throat CA  8. Event Monitor 5/1-5/14/17 - SB - NSR with PVC  9. Carotid Artery Disease   9.1 Carotid Artery US 5/8/17 - AMALIA 16-49%; 30th 50%; = or < 50% left common carotid artery; antegrade flow both vertebral arteries   10. Smoking Habituation     Patient is a 64-year-old male who presents today for follow-up with his significant other at his side.  He says he is been having some right anterior dull achiness that goes into his back he says is for infection.  He is in a lot of postnasal drip, sore throat, sneezing, coughing, congestion and he can get a lot of phlegm up.  He says his shortness of breath is been from this as well.  He states he has some palpitations when he lays down.  He denies any presyncope, syncope, orthopnea, PND or edema.  He says he does get some dizziness/lightheadedness with standing.  He is taking his medication as prescribed.  Patient does have shortness of breath with activity due to his upper respiratory infection.  He still smoking half a pack a day.  He is using a nebulizer.  His brother passed me back in April.  He says his brother with diagnosed  with cancer and  in 2 days.    Current Outpatient Medications   Medication Sig Dispense Refill   • atorvastatin (LIPITOR) 80 MG tablet Take 1 tablet by mouth Every Night. 30 tablet 11   • busPIRone (BUSPAR) 5 MG tablet Take 1 tablet by mouth 3 (Three) Times a Day As Needed (anxiety). 30 tablet 5   • clopidogrel (PLAVIX) 75 MG tablet Take 1 tablet by mouth Daily. 30 tablet 11   • FERROUS SULFATE PO Take  by mouth. Unsure of dose     • fludrocortisone 0.1 MG tablet Take 1 tablet by mouth Daily. 1 tab by mouth on Monday,Wednesday, Friday,  16 tablet 5   • gabapentin (NEURONTIN) 100 MG capsule Take 1 capsule by mouth 3 (Three) Times a Day.     • magnesium oxide (MAGOX) 400 (241.3 Mg) MG tablet tablet Take 1 tablet by mouth 2 (Two) Times a Day. 60 each 3   • nitroglycerin (NITROSTAT) 0.4 MG SL tablet Place  under the tongue.     • omeprazole (PRILOSEC) 20 MG capsule Take 1 capsule by mouth daily.     • oxyCODONE-acetaminophen (PERCOCET)  MG per tablet Take 1 tablet by mouth Every 8 (Eight) Hours As Needed.     • potassium chloride (K-DUR,KLOR-CON) 20 MEQ CR tablet Take 1 tablet by mouth 2 (Two) Times a Day. 60 tablet 3   • cefdinir (OMNICEF) 300 MG capsule Take 1 capsule by mouth Daily. 20 capsule 0     No current facility-administered medications for this visit.        ALLERGIES    Aspirin; Codeine; Ethanolamine; Nsaids; Other; Salicylates; Theophylline; and Triprolidine-pseudoephedrine    Past Medical History:   Diagnosis Date   • CAD (coronary artery disease)    • COPD (chronic obstructive pulmonary disease) (CMS/[facility])    • Current every day smoker     1.5-2 PPD   • Dizziness    • Dyslipidemia    • Edema    • History of throat cancer    • Hyperlipidemia    • Hypertension    • Ischemic cardiomyopathy    • Lightheaded    • Myocardial infarction (CMS/[facility])    • Orthostatic hypotension    • SOB (shortness of breath)    • Stroke (CMS/HCC)        Social History     Socioeconomic History   • Marital status:       Spouse name: Not on file   • Number of children: Not on file   • Years of education: Not on file   • Highest education level: Not on file   Tobacco Use   • Smoking status: Current Every Day Smoker     Packs/day: 2.00     Types: Cigarettes   • Smokeless tobacco: Never Used   Substance and Sexual Activity   • Alcohol use: Yes     Comment: occasionally   • Drug use: No   • Sexual activity: Defer       Family History   Problem Relation Age of Onset   • Diabetes Mother    • Hypertension Mother    • Hypertension Father    • Hyperlipidemia Sister         Familial hypercholesterolemia   • Hypertension Sister    • Hyperlipidemia Brother         Familial hypercholesterolemia   • Heart attack Brother         Acute MI   • Stroke Brother         CVA   • Hypertension Brother    • Heart disease Brother         pacemaker placement   • Other Brother         Pacemaker placement       Review of Systems   Constitutional: Positive for fatigue. Negative for diaphoresis.   HENT: Positive for congestion, hearing loss ([place]), postnasal drip, sneezing and sore throat. Negative for rhinorrhea.         Pt states he's had a cold x 2 days    Eyes: Positive for visual disturbance (glasses daily).   Respiratory: Positive for cough (green phlegm ), chest tightness and shortness of breath ([place] all the time due to illness).    Cardiovascular: Positive for chest pain (right sided dull achey pain, went through to pt's back; says it is due to his URI ) and palpitations (sometimes when he lays down ). Negative for leg swelling.   Gastrointestinal: Negative for abdominal pain, blood in stool, constipation, diarrhea, nausea and vomiting.   Endocrine: Positive for cold intolerance. Negative for heat intolerance.   Genitourinary: Positive for difficulty urinating (Pt states that he can't urinate during the day, but goes 4-5x at HS). Negative for dysuria, frequency, hematuria and urgency.   Musculoskeletal: Positive for arthralgias, back pain (w/  "CP ) and neck pain.   Skin: Negative for rash and wound.   Allergic/Immunologic: Negative for environmental allergies and food allergies.   Neurological: Positive for dizziness (when he stands up (ortho HTN)) and light-headedness (w/ standing ). Negative for syncope, weakness, numbness and headaches.   Hematological: Bruises/bleeds easily.        Iron deficient    Psychiatric/Behavioral: Negative for sleep disturbance.       Objective   BP 93/64   Pulse 68   Ht 175.3 cm (69\")   Wt 90.7 kg (200 lb)   SpO2 98%   BMI 29.53 kg/m²   Vitals:    06/12/19 1354   BP: 93/64   Pulse: 68   SpO2: 98%   Weight: 90.7 kg (200 lb)   Height: 175.3 cm (69\")      Lab Results (most recent)     None        Physical Exam   Constitutional: He is oriented to person, place, and time. Vital signs are normal. He appears well-developed and well-nourished. He is active and cooperative.   HENT:   Head: Normocephalic.   Mouth/Throat: Abnormal dentition (edentulous ).   Eyes: Lids are normal.   Wears glasses    Neck: Normal carotid pulses, no hepatojugular reflux and no JVD present. Carotid bruit is not present.   Cardiovascular: Normal rate, regular rhythm and normal heart sounds.   Pulses:       Radial pulses are 2+ on the right side, and 2+ on the left side.        Dorsalis pedis pulses are 2+ on the right side, and 2+ on the left side.        Posterior tibial pulses are 2+ on the right side, and 2+ on the left side.   No edema BLE.    Pulmonary/Chest: Effort normal. He has wheezes (inspiratory ) in the right upper field, the right middle field, the right lower field, the left upper field, the left middle field and the left lower field.   Abdominal: Normal appearance and bowel sounds are normal.   Neurological: He is alert and oriented to person, place, and time.   Skin: Skin is warm, dry and intact.   Psychiatric: He has a normal mood and affect. His speech is normal and behavior is normal. Judgment and thought content normal. Cognition " and memory are normal.       Procedure   Procedures         Assessment/Plan      Diagnosis Plan   1. Coronary artery disease involving coronary bypass graft of native heart with angina pectoris (CMS/HCC)     2. Essential hypertension     3. Ischemic cardiomyopathy     4. Orthostatic hypotension     5. Shortness of breath     6. Dyslipidemia     7. Current every day smoker     8. Palpitations     9. Upper respiratory tract infection, unspecified type  cefdinir (OMNICEF) 300 MG capsule       Return in about 3 months (around 9/12/2019).       CAD-patient is on Plavix and statin.  Hypertension-patient has not had any medications for hypertension in some time.  Ischemic cardia myopathy-patient is doing well.  Shortness of breath-stable.  Dyslipidemia-patient is on Lipitor morbid PCP.  Palpitations-stable.  Orthostatic hypotension-patient is on Florinef.  URI-patient will take Omnicef.  He will continue his medication regimen for now.  He will follow-up in 3 months or sooner if any changes.    Again patient feels like his chest pain is from his upper respiratory infection and he does not want any further work-up at this time.    I advised Darnell of the risks of continuing to use tobacco, and I provided him with tobacco cessation educational materials in the After Visit Summary.     During this visit, I spent <3 minutes counseling the patient regarding tobacco cessation.    Patient's Body mass index is 29.53 kg/m². BMI is above normal parameters. Recommendations include: educational material.      Electronically signed by:

## 2019-06-12 NOTE — PATIENT INSTRUCTIONS
Steps to Quit Smoking  Smoking tobacco can be bad for your health. It can also affect almost every organ in your body. Smoking puts you and people around you at risk for many serious long-lasting (chronic) diseases. Quitting smoking is hard, but it is one of the best things that you can do for your health. It is never too late to quit.  What are the benefits of quitting smoking?  When you quit smoking, you lower your risk for getting serious diseases and conditions. They can include:  · Lung cancer or lung disease.  · Heart disease.  · Stroke.  · Heart attack.  · Not being able to have children (infertility).  · Weak bones (osteoporosis) and broken bones (fractures).    If you have coughing, wheezing, and shortness of breath, those symptoms may get better when you quit. You may also get sick less often. If you are pregnant, quitting smoking can help to lower your chances of having a baby of low birth weight.  What can I do to help me quit smoking?  Talk with your doctor about what can help you quit smoking. Some things you can do (strategies) include:  · Quitting smoking totally, instead of slowly cutting back how much you smoke over a period of time.  · Going to in-person counseling. You are more likely to quit if you go to many counseling sessions.  · Using resources and support systems, such as:  ? Online chats with a counselor.  ? Phone quitlines.  ? Printed self-help materials.  ? Support groups or group counseling.  ? Text messaging programs.  ? Mobile phone apps or applications.  · Taking medicines. Some of these medicines may have nicotine in them. If you are pregnant or breastfeeding, do not take any medicines to quit smoking unless your doctor says it is okay. Talk with your doctor about counseling or other things that can help you.    Talk with your doctor about using more than one strategy at the same time, such as taking medicines while you are also going to in-person counseling. This can help make  quitting easier.  What things can I do to make it easier to quit?  Quitting smoking might feel very hard at first, but there is a lot that you can do to make it easier. Take these steps:  · Talk to your family and friends. Ask them to support and encourage you.  · Call phone quitlines, reach out to support groups, or work with a counselor.  · Ask people who smoke to not smoke around you.  · Avoid places that make you want (trigger) to smoke, such as:  ? Bars.  ? Parties.  ? Smoke-break areas at work.  · Spend time with people who do not smoke.  · Lower the stress in your life. Stress can make you want to smoke. Try these things to help your stress:  ? Getting regular exercise.  ? Deep-breathing exercises.  ? Yoga.  ? Meditating.  ? Doing a body scan. To do this, close your eyes, focus on one area of your body at a time from head to toe, and notice which parts of your body are tense. Try to relax the muscles in those areas.  · Download or buy apps on your mobile phone or tablet that can help you stick to your quit plan. There are many free apps, such as QuitGuide from the CDC (Centers for Disease Control and Prevention). You can find more support from smokefree.gov and other websites.    This information is not intended to replace advice given to you by your health care provider. Make sure you discuss any questions you have with your health care provider.  Document Released: 10/14/2010 Document Revised: 08/15/2017 Document Reviewed: 05/03/2016  [employer] Interactive Patient Education © 2019 [employer]  Fat and Cholesterol Restricted Eating Plan  Getting too much fat and cholesterol in your diet may cause health problems. Choosing the right foods helps keep your fat and cholesterol at normal levels. This can keep you from getting certain diseases.  Your doctor may recommend an eating plan that includes:  · Total fat: ______% or less of total calories a day.  · Saturated fat: ______% or less of total calories a  "day.  · Cholesterol: less than _________mg a day.  · Fiber: ______g a day.    What are tips for following this plan?  General tips  · Work with your doctor to lose weight if you need to.  · Avoid:  ? Foods with added sugar.  ? Fried foods.  ? Foods with partially hydrogenated oils.  · Limit alcohol intake to no more than 1 drink a day for nonpregnant women and 2 drinks a day for men. One drink equals 12 oz of beer, 5 oz of wine, or 1½ oz of hard liquor.  Reading food labels  · Check food labels for:  ? Trans fats.  ? Partially hydrogenated oils.  ? Saturated fat (g) in each serving.  ? Cholesterol (mg) in each serving.  ? Fiber (g) in each serving.  · Choose foods with healthy fats, such as:  ? Monounsaturated fats.  ? Polyunsaturated fats.  ? Omega-3 fats.  · Choose grain products that have whole grains. Look for the word \"whole\" as the first word in the ingredient list.  Cooking  · Cook foods using low-fat methods. These include baking, boiling, grilling, and broiling.  · Eat more home-cooked foods. Eat at restaurants and buffets less often.  · Avoid cooking using saturated fats, such as butter, cream, palm oil, palm kernel oil, and coconut oil.  Meal planning  · At meals, divide your plate into four equal parts:  ? Fill one-half of your plate with vegetables and green salads.  ? Fill one-fourth of your plate with whole grains.  ? Fill one-fourth of your plate with low-fat (lean) protein foods.  · Eat fish that is high in omega-3 fats at least two times a week. This includes mackerel, tuna, sardines, and salmon.  · Eat foods that are high in fiber, such as whole grains, beans, apples, broccoli, carrots, peas, and barley.  Recommended foods  Grains  · Whole grains, such as whole wheat or whole grain breads, crackers, cereals, and pasta. Unsweetened oatmeal, bulgur, barley, quinoa, or brown rice. Corn or whole wheat flour tortillas.  Vegetables  · Fresh or frozen vegetables (raw, steamed, roasted, or grilled). Green " salads.  Fruits  · All fresh, canned (in natural juice), or frozen fruits.  Meats and other protein foods  · Ground beef (85% or leaner), grass-fed beef, or beef trimmed of fat. Skinless chicken or turkey. Ground chicken or turkey. Pork trimmed of fat. All fish and seafood. Egg whites. Dried beans, peas, or lentils. Unsalted nuts or seeds. Unsalted canned beans. Nut butters without added sugar or oil.  Dairy  · Low-fat or nonfat dairy products, such as skim or 1% milk, 2% or reduced-fat cheeses, low-fat and fat-free ricotta or cottage cheese, or plain low-fat and nonfat yogurt.  Fats and oils  · Tub margarine without trans fats. Light or reduced-fat mayonnaise and salad dressings. Avocado. Olive, canola, sesame, or safflower oils.  The items listed above may not be a complete list of recommended foods or beverages. Contact your dietitian for more options.  Foods to avoid  Grains  · White bread. White pasta. White rice. Cornbread. Bagels, pastries, and croissants. Crackers and snack foods that contain trans fat and hydrogenated oils.  Vegetables  · Vegetables cooked in cheese, cream, or butter sauce. Fried vegetables.  Fruits  · Canned fruit in heavy syrup. Fruit in cream or butter sauce. Fried fruit.  Meats and other protein foods  · Fatty cuts of meat. Ribs, chicken wings, ruelas, sausage, bologna, salami, chitterlings, fatback, hot dogs, bratwurst, and packaged lunch meats. Liver and organ meats. Whole eggs and egg yolks. Chicken and turkey with skin. Fried meat.  Dairy  · Whole or 2% milk, cream, half-and-half, and cream cheese. Whole milk cheeses. Whole-fat or sweetened yogurt. Full-fat cheeses. Nondairy creamers and whipped toppings. Processed cheese, cheese spreads, and cheese curds.  Beverages  · Alcohol. Sugar-sweetened drinks such as sodas, lemonade, and fruit drinks.  Fats and oils  · Butter, stick margarine, lard, shortening, ghee, or ruelas fat. Coconut, palm kernel, and palm oils.  Sweets and  desserts  · Corn syrup, sugars, honey, and molasses. Candy. Jam and jelly. Syrup. Sweetened cereals. Cookies, pies, cakes, donuts, muffins, and ice cream.  The items listed above may not be a complete list of foods and beverages to avoid. Contact your dietitian for more information.  Summary  · Choosing the right foods helps keep your fat and cholesterol at normal levels. This can keep you from getting certain diseases.  · At meals, fill one-half of your plate with vegetables and green salads.  · Eat high-fiber foods, like whole grains, beans, apples, carrots, peas, and barley.  · Limit added sugar, saturated fats, alcohol, and fried foods.  This information is not intended to replace advice given to you by your health care provider. Make sure you discuss any questions you have with your health care provider.  Document Released: 06/18/2013 Document Revised: 09/04/2018 Document Reviewed: 09/04/2018  [employer] Interactive Patient Education © 2019 [employer]  BMI for Adults  Body mass index (BMI) is a number that is calculated from a person's weight and height. In most adults, the number is used to find how much of an adult's weight is made up of fat. BMI is not as accurate as a direct measure of body fat.  How is BMI calculated?  BMI is calculated by dividing weight in kilograms by height in meters squared. It can also be calculated by dividing weight in pounds by height in inches squared, then multiplying the resulting number by 703. Charts are available to help you find your BMI quickly and easily without doing this calculation.  How is BMI interpreted?  Health care professionals use BMI charts to identify whether an adult is underweight, at a normal weight, or overweight based on the following guidelines:  · Underweight: BMI less than 18.5.  · Normal weight: BMI between 18.5 and 24.9.  · Overweight: BMI between 25 and 29.9.  · Obese: BMI of 30 and above.    BMI is usually interpreted the same for males and  "females.  Weight includes both fat and muscle, so someone with a muscular build, such as an athlete, may have a BMI that is higher than 24.9. In cases like these, BMI may not accurately depict body fat. To determine if excess body fat is the cause of a BMI of 25 or higher, further assessments may need to be done by a health care provider.  Why is BMI a useful tool?  BMI is used to identify a possible weight problem that may be related to a medical problem or may increase the risk for medical problems. BMI can also be used to promote changes to reach a healthy weight.  This information is not intended to replace advice given to you by your health care provider. Make sure you discuss any questions you have with your health care provider.  Document Released: 08/29/2005 Document Revised: 04/27/2017 Document Reviewed: 05/15/2015  [employer] Interactive Patient Education © 2018 [employer]    Upper Respiratory Infection, Adult  An upper respiratory infection (URI) affects the nose, throat, and upper air passages. URIs are caused by germs (viruses). The most common type of URI is often called \"the common cold.\"  Medicines cannot cure URIs, but you can do things at home to relieve your symptoms. URIs usually get better within 7-10 days.  Follow these instructions at home:  Activity  · Rest as needed.  · If you have a fever, stay home from work or school until your fever is gone, or until your doctor says you may return to work or school.  ? You should stay home until you cannot spread the infection anymore (you are not contagious).  ? Your doctor may have you wear a face mask so you have less risk of spreading the infection.  Relieving symptoms  · Gargle with a salt-water mixture 3-4 times a day or as needed. To make a salt-water mixture, completely dissolve ½-1 tsp of salt in 1 cup of warm water.  · Use a cool-mist humidifier to add moisture to the air. This can help you breathe more easily.  Eating and drinking  · Drink " "enough fluid to keep your pee (urine) pale yellow.  · Eat soups and other clear broths.  General instructions  · Take over-the-counter and prescription medicines only as told by your doctor. These include cold medicines, fever reducers, and cough suppressants.  · Do not use any products that contain nicotine or tobacco. These include cigarettes and e-cigarettes. If you need help quitting, ask your doctor.  · Avoid being where people are smoking (avoid secondhand smoke).  · Make sure you get regular shots and get the flu shot every year.  · Keep all follow-up visits as told by your doctor. This is important.  How to avoid spreading infection to others  · Wash your hands often with soap and water. If you do not have soap and water, use hand .  · Avoid touching your mouth, face, eyes, or nose.  · Cough or sneeze into a tissue or your sleeve or elbow. Do not cough or sneeze into your hand or into the air.  Contact a doctor if:  · You are getting worse, not better.  · You have any of these:  ? A fever.  ? Chills.  ? Brown or red mucus in your nose.  ? Yellow or brown fluid (discharge)coming from your nose.  ? Pain in your face, especially when you bend forward.  ? Swollen neck glands.  ? Pain with swallowing.  ? White areas in the back of your throat.  Get help right away if:  · You have shortness of breath that gets worse.  · You have very bad or constant:  ? Headache.  ? Ear pain.  ? Pain in your forehead, behind your eyes, and over your cheekbones (sinus pain).  ? Chest pain.  · You have long-lasting (chronic) lung disease along with any of these:  ? Wheezing.  ? Long-lasting cough.  ? Coughing up blood.  ? A change in your usual mucus.  · You have a stiff neck.  · You have changes in your:  ? Vision.  ? Hearing.  ? Thinking.  ? Mood.  Summary  · An upper respiratory infection (URI) is caused by a germ called a virus. The most common type of URI is often called \"the common cold.\"  · URIs usually get better " within 7-10 days.  · Take over-the-counter and prescription medicines only as told by your doctor.  This information is not intended to replace advice given to you by your health care provider. Make sure you discuss any questions you have with your health care provider.  Document Released: 06/05/2009 Document Revised: 08/10/2018 Document Reviewed: 08/10/2018  [employer] Interactive Patient Education © 2019 [employer]

## 2019-07-01 ENCOUNTER — OFFICE VISIT (OUTPATIENT)
Dept: CARDIOLOGY | Facility: CLINIC | Age: 64
End: 2019-07-01

## 2019-07-01 VITALS
WEIGHT: 202 LBS | OXYGEN SATURATION: 96 % | SYSTOLIC BLOOD PRESSURE: 103 MMHG | BODY MASS INDEX: 29.92 KG/M2 | DIASTOLIC BLOOD PRESSURE: 62 MMHG | HEIGHT: 69 IN | HEART RATE: 70 BPM

## 2019-07-01 DIAGNOSIS — R06.02 SHORTNESS OF BREATH: Primary | ICD-10-CM

## 2019-07-01 DIAGNOSIS — I25.10 CORONARY ARTERY DISEASE INVOLVING NATIVE CORONARY ARTERY OF NATIVE HEART WITHOUT ANGINA PECTORIS: ICD-10-CM

## 2019-07-01 DIAGNOSIS — R07.9 CHEST PAIN, UNSPECIFIED TYPE: ICD-10-CM

## 2019-07-01 PROCEDURE — 99214 OFFICE O/P EST MOD 30 MIN: CPT | Performed by: PHYSICIAN ASSISTANT

## 2019-07-01 RX ORDER — ISOSORBIDE MONONITRATE 30 MG/1
30 TABLET, EXTENDED RELEASE ORAL DAILY
COMMUNITY
End: 2021-02-04 | Stop reason: SDUPTHER

## 2019-07-01 RX ORDER — RANOLAZINE 1000 MG/1
1000 TABLET, EXTENDED RELEASE ORAL 2 TIMES DAILY
COMMUNITY
End: 2020-10-29

## 2019-07-01 NOTE — PATIENT INSTRUCTIONS
"Fat and Cholesterol Restricted Eating Plan  Getting too much fat and cholesterol in your diet may cause health problems. Choosing the right foods helps keep your fat and cholesterol at normal levels. This can keep you from getting certain diseases.  Your doctor may recommend an eating plan that includes:  · Total fat: ______% or less of total calories a day.  · Saturated fat: ______% or less of total calories a day.  · Cholesterol: less than _________mg a day.  · Fiber: ______g a day.    What are tips for following this plan?  General tips  · Work with your doctor to lose weight if you need to.  · Avoid:  ? Foods with added sugar.  ? Fried foods.  ? Foods with partially hydrogenated oils.  · Limit alcohol intake to no more than 1 drink a day for nonpregnant women and 2 drinks a day for men. One drink equals 12 oz of beer, 5 oz of wine, or 1½ oz of hard liquor.  Reading food labels  · Check food labels for:  ? Trans fats.  ? Partially hydrogenated oils.  ? Saturated fat (g) in each serving.  ? Cholesterol (mg) in each serving.  ? Fiber (g) in each serving.  · Choose foods with healthy fats, such as:  ? Monounsaturated fats.  ? Polyunsaturated fats.  ? Omega-3 fats.  · Choose grain products that have whole grains. Look for the word \"whole\" as the first word in the ingredient list.  Cooking  · Cook foods using low-fat methods. These include baking, boiling, grilling, and broiling.  · Eat more home-cooked foods. Eat at restaurants and buffets less often.  · Avoid cooking using saturated fats, such as butter, cream, palm oil, palm kernel oil, and coconut oil.  Meal planning  · At meals, divide your plate into four equal parts:  ? Fill one-half of your plate with vegetables and green salads.  ? Fill one-fourth of your plate with whole grains.  ? Fill one-fourth of your plate with low-fat (lean) protein foods.  · Eat fish that is high in omega-3 fats at least two times a week. This includes mackerel, tuna, sardines, and " salmon.  · Eat foods that are high in fiber, such as whole grains, beans, apples, broccoli, carrots, peas, and barley.  Recommended foods  Grains  · Whole grains, such as whole wheat or whole grain breads, crackers, cereals, and pasta. Unsweetened oatmeal, bulgur, barley, quinoa, or brown rice. Corn or whole wheat flour tortillas.  Vegetables  · Fresh or frozen vegetables (raw, steamed, roasted, or grilled). Green salads.  Fruits  · All fresh, canned (in natural juice), or frozen fruits.  Meats and other protein foods  · Ground beef (85% or leaner), grass-fed beef, or beef trimmed of fat. Skinless chicken or turkey. Ground chicken or turkey. Pork trimmed of fat. All fish and seafood. Egg whites. Dried beans, peas, or lentils. Unsalted nuts or seeds. Unsalted canned beans. Nut butters without added sugar or oil.  Dairy  · Low-fat or nonfat dairy products, such as skim or 1% milk, 2% or reduced-fat cheeses, low-fat and fat-free ricotta or cottage cheese, or plain low-fat and nonfat yogurt.  Fats and oils  · Tub margarine without trans fats. Light or reduced-fat mayonnaise and salad dressings. Avocado. Olive, canola, sesame, or safflower oils.  The items listed above may not be a complete list of recommended foods or beverages. Contact your dietitian for more options.  Foods to avoid  Grains  · White bread. White pasta. White rice. Cornbread. Bagels, pastries, and croissants. Crackers and snack foods that contain trans fat and hydrogenated oils.  Vegetables  · Vegetables cooked in cheese, cream, or butter sauce. Fried vegetables.  Fruits  · Canned fruit in heavy syrup. Fruit in cream or butter sauce. Fried fruit.  Meats and other protein foods  · Fatty cuts of meat. Ribs, chicken wings, ruelas, sausage, bologna, salami, chitterlings, fatback, hot dogs, bratwurst, and packaged lunch meats. Liver and organ meats. Whole eggs and egg yolks. Chicken and turkey with skin. Fried meat.  Dairy  · Whole or 2% milk, cream,  half-and-half, and cream cheese. Whole milk cheeses. Whole-fat or sweetened yogurt. Full-fat cheeses. Nondairy creamers and whipped toppings. Processed cheese, cheese spreads, and cheese curds.  Beverages  · Alcohol. Sugar-sweetened drinks such as sodas, lemonade, and fruit drinks.  Fats and oils  · Butter, stick margarine, lard, shortening, ghee, or ruelas fat. Coconut, palm kernel, and palm oils.  Sweets and desserts  · Corn syrup, sugars, honey, and molasses. Candy. Jam and jelly. Syrup. Sweetened cereals. Cookies, pies, cakes, donuts, muffins, and ice cream.  The items listed above may not be a complete list of foods and beverages to avoid. Contact your dietitian for more information.  Summary  · Choosing the right foods helps keep your fat and cholesterol at normal levels. This can keep you from getting certain diseases.  · At meals, fill one-half of your plate with vegetables and green salads.  · Eat high-fiber foods, like whole grains, beans, apples, carrots, peas, and barley.  · Limit added sugar, saturated fats, alcohol, and fried foods.  This information is not intended to replace advice given to you by your health care provider. Make sure you discuss any questions you have with your health care provider.  Document Released: 06/18/2013 Document Revised: 09/04/2018 Document Reviewed: 09/04/2018  [employer] Interactive Patient Education © 2019 [employer]  BMI for Adults  Body mass index (BMI) is a number that is calculated from a person's weight and height. In most adults, the number is used to find how much of an adult's weight is made up of fat. BMI is not as accurate as a direct measure of body fat.  How is BMI calculated?  BMI is calculated by dividing weight in kilograms by height in meters squared. It can also be calculated by dividing weight in pounds by height in inches squared, then multiplying the resulting number by 703. Charts are available to help you find your BMI quickly and easily without  doing this calculation.  How is BMI interpreted?  Health care professionals use BMI charts to identify whether an adult is underweight, at a normal weight, or overweight based on the following guidelines:  · Underweight: BMI less than 18.5.  · Normal weight: BMI between 18.5 and 24.9.  · Overweight: BMI between 25 and 29.9.  · Obese: BMI of 30 and above.    BMI is usually interpreted the same for males and females.  Weight includes both fat and muscle, so someone with a muscular build, such as an athlete, may have a BMI that is higher than 24.9. In cases like these, BMI may not accurately depict body fat. To determine if excess body fat is the cause of a BMI of 25 or higher, further assessments may need to be done by a health care provider.  Why is BMI a useful tool?  BMI is used to identify a possible weight problem that may be related to a medical problem or may increase the risk for medical problems. BMI can also be used to promote changes to reach a healthy weight.  This information is not intended to replace advice given to you by your health care provider. Make sure you discuss any questions you have with your health care provider.  Document Released: 08/29/2005 Document Revised: 04/27/2017 Document Reviewed: 05/15/2015  [employer] Interactive Patient Education © 2018 [employer]

## 2019-07-01 NOTE — PROGRESS NOTES
Problem list     Subjective   Darnell Abarca is a 64 y.o. male     Chief Complaint   Patient presents with   • Coronary Artery Disease   • Chest Pain       HPI      Problem List:     1. CAD  1.1 CABG @ [facility] distant past  1.2 [facility] 6/2015 - patient grafts with disease in the native vessels with medical management recommendation; EF 40%  1.3 stress test 12/10/18-apical and inferoapical ischemia, depressed post stress EF 44%  2. Ischemic Cardiomyopathy   2.1 Echo 8/1/2014 - EF 50-55%; DD I; mild LVH; mild TR; trace MI; PA 30-35  2.2 Echo 4/20/17-mild LVH, EF 45-50%, diastolic dysfunction 1, mild MR, mild TR, PA 30-35, dilation of aortic root  2.3 Echo 12/10/18-EF 50-55%, mild LVH, diastolic dysfunction 1, mild dilated aortic root 3.8 trace to mild TR, trace to mild MR  3. CHF; class II-III symptoms   4. Dyslipidemia   5. Orthostatic hypotension  6. COPD  7. History of throat CA  8. Event Monitor 5/1-5/14/17 - SB - NSR with PVC  9. Carotid Artery Disease   9.1 Carotid Artery US 5/8/17 - AMALIA 16-49%; 30th 50%; = or < 50% left common carotid artery; antegrade flow both vertebral arteries   10. Smoking Habituation        Patient is a 64-year-old male who presents back to the office for follow-up.  Patient was recently admitted into the hospital because of episodes of chest pain.  Patient was admitted.  During the hospital stay, he had stress test demonstrating anterior and inferior ischemia.  Was recommended that he undergo cardiac catheterization but he refused.  He wanted to have this done as an outpatient.    He does not describe having pain as severe as what he felt previous to hospital admission.  He has been concerned about the level of his symptoms.  Furthermore, he has significant exertional dyspnea that is limiting him as well.  He has history of chronic heart failure and some of the dyspnea is likely related.  He has no PND orthopnea.    He does not palpitated of dysrhythmic symptoms.  He otherwise is doing  well        Outpatient Encounter Medications as of 7/1/2019   Medication Sig Dispense Refill   • atorvastatin (LIPITOR) 80 MG tablet Take 1 tablet by mouth Every Night. 30 tablet 11   • clopidogrel (PLAVIX) 75 MG tablet Take 1 tablet by mouth Daily. 30 tablet 11   • FERROUS SULFATE PO Take  by mouth. Unsure of dose     • fludrocortisone 0.1 MG tablet Take 1 tablet by mouth Daily. 1 tab by mouth on Monday,Wednesday, Friday, Sunday 16 tablet 5   • gabapentin (NEURONTIN) 100 MG capsule Take 1 capsule by mouth 3 (Three) Times a Day.     • isosorbide mononitrate (IMDUR) 120 MG 24 hr tablet Take 120 mg by mouth Daily.     • magnesium oxide (MAGOX) 400 (241.3 Mg) MG tablet tablet Take 1 tablet by mouth 2 (Two) Times a Day. 60 each 3   • nitroglycerin (NITROSTAT) 0.4 MG SL tablet Place  under the tongue.     • omeprazole (PRILOSEC) 20 MG capsule Take 1 capsule by mouth daily.     • oxyCODONE-acetaminophen (PERCOCET)  MG per tablet Take 1 tablet by mouth Every 8 (Eight) Hours As Needed.     • potassium chloride (K-DUR,KLOR-CON) 20 MEQ CR tablet Take 1 tablet by mouth 2 (Two) Times a Day. 60 tablet 3   • ranolazine (RANEXA) 1000 MG 12 hr tablet Take 1,000 mg by mouth 2 (Two) Times a Day.     • [DISCONTINUED] busPIRone (BUSPAR) 5 MG tablet Take 1 tablet by mouth 3 (Three) Times a Day As Needed (anxiety). 30 tablet 5   • [DISCONTINUED] cefdinir (OMNICEF) 300 MG capsule Take 1 capsule by mouth Daily. 20 capsule 0     No facility-administered encounter medications on file as of 7/1/2019.        Aspirin; Codeine; Ethanolamine; Nsaids; Other; Salicylates; Theophylline; and Triprolidine-pseudoephedrine    Past Medical History:   Diagnosis Date   • CAD (coronary artery disease)    • COPD (chronic obstructive pulmonary disease) (CMS/[facility])    • Current every day smoker     1.5-2 PPD   • Dizziness    • Dyslipidemia    • Edema    • History of throat cancer    • Hyperlipidemia    • Hypertension    • Ischemic cardiomyopathy    •  Lightheaded    • Myocardial infarction (CMS/HCC)    • Orthostatic hypotension    • SOB (shortness of breath)    • Stroke (CMS/HCC)        Social History     Socioeconomic History   • Marital status:      Spouse name: Not on file   • Number of children: Not on file   • Years of education: Not on file   • Highest education level: Not on file   Tobacco Use   • Smoking status: Former Smoker     Packs/day: 2.00     Types: Cigarettes     Last attempt to quit: 6/29/2019   • Smokeless tobacco: Never Used   Substance and Sexual Activity   • Alcohol use: Yes     Comment: occasionally   • Drug use: No   • Sexual activity: Defer       Family History   Problem Relation Age of Onset   • Diabetes Mother    • Hypertension Mother    • Hypertension Father    • Hyperlipidemia Sister         Familial hypercholesterolemia   • Hypertension Sister    • Hyperlipidemia Brother         Familial hypercholesterolemia   • Heart attack Brother         Acute MI   • Stroke Brother         CVA   • Hypertension Brother    • Heart disease Brother         pacemaker placement   • Other Brother         Pacemaker placement       Review of Systems   Constitutional: Positive for fatigue. Negative for chills and fever.   HENT: Positive for congestion (stuffy nose ). Negative for rhinorrhea and sore throat.         Swelling in b/l neck    Eyes: Positive for visual disturbance (Glasses daily ).   Respiratory: Positive for chest tightness (Chest tightness with a lot of fluid build up in left side of chest and in both sides of his neck ) and shortness of breath (Some shortness of air ).    Cardiovascular: Positive for chest pain (Went to [facility] ER with chest pain. Had a stress test and was told he has blockages and needs to have a heart cath. ). Negative for palpitations and leg swelling.   Gastrointestinal: Positive for nausea and vomiting (vomited yesterday ). Negative for abdominal pain and blood in stool.   Endocrine: Negative for cold intolerance and  "heat intolerance.   Genitourinary: Negative.    Musculoskeletal: Positive for arthralgias (joints ) and back pain (low back pain ).   Skin: Negative.  Negative for rash and wound.   Allergic/Immunologic: Negative for environmental allergies and food allergies.   Neurological: Positive for dizziness, weakness (weakness in legs ) and light-headedness (when standing up in the last few days ).   Hematological: Bruises/bleeds easily (Bruises and bleeds easily ).   Psychiatric/Behavioral: Negative.  Negative for sleep disturbance (Denies waking with smothering or SOA).   All other systems reviewed and are negative.      Objective   Vitals:    07/01/19 1412   BP: 103/62   BP Location: Left arm   Patient Position: Sitting   Pulse: 70   SpO2: 96%   Weight: 91.6 kg (202 lb)   Height: 175.3 cm (69\")      /62 (BP Location: Left arm, Patient Position: Sitting)   Pulse 70   Ht 175.3 cm (69\")   Wt 91.6 kg (202 lb)   SpO2 96%   BMI 29.83 kg/m²     Lab Results (most recent)     None          Physical Exam   Constitutional: He is oriented to person, place, and time. He appears well-developed and well-nourished. No distress.   HENT:   Head: Normocephalic and atraumatic.   Eyes: EOM are normal. Pupils are equal, round, and reactive to light.   Neck: No JVD present.   Cardiovascular: Normal rate, regular rhythm, normal heart sounds and intact distal pulses. Exam reveals no gallop and no friction rub.   No murmur heard.  Pulmonary/Chest: Effort normal and breath sounds normal. No respiratory distress. He has no wheezes. He has no rales. He exhibits no tenderness.   Abdominal: He exhibits no distension. There is no tenderness.   Musculoskeletal: Normal range of motion. He exhibits no edema.   Neurological: He is alert and oriented to person, place, and time. No cranial nerve deficit.   Skin: Skin is warm and dry. No rash noted. No erythema. No pallor.   Psychiatric: He has a normal mood and affect. His behavior is normal. "   Nursing note and vitals reviewed.      Procedure   Procedures       Assessment/Plan     Problems Addressed this Visit        Cardiovascular and Mediastinum    Coronary artery disease involving native coronary artery of native heart without angina pectoris (Chronic)    Relevant Medications    isosorbide mononitrate (IMDUR) 120 MG 24 hr tablet    ranolazine (RANEXA) 1000 MG 12 hr tablet    Other Relevant Orders    CBC & Differential    Comprehensive Metabolic Panel    proBNP    [facility]    CT Chest Without Contrast       Respiratory    Shortness of breath - Primary    Relevant Orders    CBC & Differential    Comprehensive Metabolic Panel    proBNP    [facility]    CT Chest Without Contrast       Nervous and Auditory    Chest pain    Relevant Orders    CBC & Differential    Comprehensive Metabolic Panel    proBNP    [facility]    CT Chest Without Contrast          Recommendation  1.  Patient with complaints of chest pain at rest.  Stress test demonstrates anterior and inferior ischemia.  Patient with history of coronary disease.  With resting discomfort and potentially ischemia 2 vascular distributions, cardiac catheterization will be scheduled.  2.  Patient with chronic heart failure but euvolemic at this point.  3.  Patient with chronic tobacco use and history of cancer.  I would like to perform CT scan to evaluate patient's lung disease as he describes to me that he has not had that done.  4.  Otherwise we will see him back for follow-up after catheterization.  He will follow with primary scheduled           Patient's Body mass index is 29.83 kg/m². BMI is above normal parameters. Recommendations include: educational material and referral to primary care.       Electronically signed by:

## 2019-08-06 ENCOUNTER — TELEPHONE (OUTPATIENT)
Dept: CARDIOLOGY | Facility: CLINIC | Age: 64
End: 2019-08-06

## 2019-08-06 DIAGNOSIS — I25.110 CORONARY ARTERY DISEASE INVOLVING NATIVE CORONARY ARTERY OF NATIVE HEART WITH UNSTABLE ANGINA PECTORIS (HCC): Primary | ICD-10-CM

## 2019-08-06 DIAGNOSIS — R94.39 ABNORMAL STRESS TEST: ICD-10-CM

## 2019-08-06 DIAGNOSIS — R07.89 OTHER CHEST PAIN: ICD-10-CM

## 2019-08-06 DIAGNOSIS — R06.02 SHORTNESS OF BREATH: ICD-10-CM

## 2019-08-06 NOTE — TELEPHONE ENCOUNTER
Spoke with Eli Mensah, patient has been in the ER 2 x for chest pain.  Advised he was suppose to have [facility] yesterday, but she says he was in ER there yesterday.  Advised we need to get rescheduled.  She says ok to set up with Dr. Damico.  Advised that way we will get done sooner.  He is on Imdur 120 and Ranexa 1000 BID already.  I will resubmit order for Nancy to get set up with Dr. Damico.     When nancy called Dr. Damico we realized he had the C, but we had not received the report so we will obtain it.  I will call Eli Back and let her know that they may need to look for other causes.     I did speak with  Eli again and she will refer patient to gastroenterology for eval.

## 2019-08-28 ENCOUNTER — OFFICE VISIT (OUTPATIENT)
Dept: CARDIOLOGY | Facility: CLINIC | Age: 64
End: 2019-08-28

## 2019-08-28 VITALS
HEART RATE: 73 BPM | SYSTOLIC BLOOD PRESSURE: 122 MMHG | HEIGHT: 69 IN | OXYGEN SATURATION: 98 % | DIASTOLIC BLOOD PRESSURE: 78 MMHG | WEIGHT: 203.8 LBS | BODY MASS INDEX: 30.18 KG/M2

## 2019-08-28 DIAGNOSIS — I25.5 GENERALIZED ISCHEMIC MYOCARDIAL DYSFUNCTION: ICD-10-CM

## 2019-08-28 DIAGNOSIS — I10 ESSENTIAL HYPERTENSION: Chronic | ICD-10-CM

## 2019-08-28 DIAGNOSIS — R06.02 SHORTNESS OF BREATH: ICD-10-CM

## 2019-08-28 DIAGNOSIS — F17.200 CURRENT EVERY DAY SMOKER: Chronic | ICD-10-CM

## 2019-08-28 DIAGNOSIS — I25.709 CORONARY ARTERY DISEASE INVOLVING CORONARY BYPASS GRAFT OF NATIVE HEART WITH ANGINA PECTORIS (HCC): Primary | ICD-10-CM

## 2019-08-28 DIAGNOSIS — I95.1 ORTHOSTATIC HYPOTENSION: Chronic | ICD-10-CM

## 2019-08-28 DIAGNOSIS — E78.5 DYSLIPIDEMIA: Chronic | ICD-10-CM

## 2019-08-28 PROCEDURE — 99213 OFFICE O/P EST LOW 20 MIN: CPT | Performed by: NURSE PRACTITIONER

## 2019-08-28 RX ORDER — TAMSULOSIN HYDROCHLORIDE 0.4 MG/1
1 CAPSULE ORAL NIGHTLY
COMMUNITY
End: 2021-05-04

## 2019-08-28 RX ORDER — FLUDROCORTISONE ACETATE 0.1 MG/1
0.1 TABLET ORAL DAILY
Qty: 16 TABLET | Refills: 5 | Status: SHIPPED | OUTPATIENT
Start: 2019-08-28 | End: 2019-09-03 | Stop reason: SDUPTHER

## 2019-08-28 NOTE — PATIENT INSTRUCTIONS
"Fat and Cholesterol Restricted Eating Plan  Getting too much fat and cholesterol in your diet may cause health problems. Choosing the right foods helps keep your fat and cholesterol at normal levels. This can keep you from getting certain diseases.  Your doctor may recommend an eating plan that includes:  · Total fat: ______% or less of total calories a day.  · Saturated fat: ______% or less of total calories a day.  · Cholesterol: less than _________mg a day.  · Fiber: ______g a day.  What are tips for following this plan?  General tips    · Work with your doctor to lose weight if you need to.  · Avoid:  ? Foods with added sugar.  ? Fried foods.  ? Foods with partially hydrogenated oils.  · Limit alcohol intake to no more than 1 drink a day for nonpregnant women and 2 drinks a day for men. One drink equals 12 oz of beer, 5 oz of wine, or 1½ oz of hard liquor.  Reading food labels  · Check food labels for:  ? Trans fats.  ? Partially hydrogenated oils.  ? Saturated fat (g) in each serving.  ? Cholesterol (mg) in each serving.  ? Fiber (g) in each serving.  · Choose foods with healthy fats, such as:  ? Monounsaturated fats.  ? Polyunsaturated fats.  ? Omega-3 fats.  · Choose grain products that have whole grains. Look for the word \"whole\" as the first word in the ingredient list.  Cooking  · Cook foods using low-fat methods. These include baking, boiling, grilling, and broiling.  · Eat more home-cooked foods. Eat at restaurants and buffets less often.  · Avoid cooking using saturated fats, such as butter, cream, palm oil, palm kernel oil, and coconut oil.  Meal planning    · At meals, divide your plate into four equal parts:  ? Fill one-half of your plate with vegetables and green salads.  ? Fill one-fourth of your plate with whole grains.  ? Fill one-fourth of your plate with low-fat (lean) protein foods.  · Eat fish that is high in omega-3 fats at least two times a week. This includes mackerel, tuna, sardines, and " salmon.  · Eat foods that are high in fiber, such as whole grains, beans, apples, broccoli, carrots, peas, and barley.  Recommended foods  Grains  · Whole grains, such as whole wheat or whole grain breads, crackers, cereals, and pasta. Unsweetened oatmeal, bulgur, barley, quinoa, or brown rice. Corn or whole wheat flour tortillas.  Vegetables  · Fresh or frozen vegetables (raw, steamed, roasted, or grilled). Green salads.  Fruits  · All fresh, canned (in natural juice), or frozen fruits.  Meats and other protein foods  · Ground beef (85% or leaner), grass-fed beef, or beef trimmed of fat. Skinless chicken or turkey. Ground chicken or turkey. Pork trimmed of fat. All fish and seafood. Egg whites. Dried beans, peas, or lentils. Unsalted nuts or seeds. Unsalted canned beans. Nut butters without added sugar or oil.  Dairy  · Low-fat or nonfat dairy products, such as skim or 1% milk, 2% or reduced-fat cheeses, low-fat and fat-free ricotta or cottage cheese, or plain low-fat and nonfat yogurt.  Fats and oils  · Tub margarine without trans fats. Light or reduced-fat mayonnaise and salad dressings. Avocado. Olive, canola, sesame, or safflower oils.  The items listed above may not be a complete list of recommended foods or beverages. Contact your dietitian for more options.  The items listed above may not be a complete list of foods and beverages [you/your child] can eat. Contact a dietitian for more information.  Foods to avoid  Grains  · White bread. White pasta. White rice. Cornbread. Bagels, pastries, and croissants. Crackers and snack foods that contain trans fat and hydrogenated oils.  Vegetables  · Vegetables cooked in cheese, cream, or butter sauce. Fried vegetables.  Fruits  · Canned fruit in heavy syrup. Fruit in cream or butter sauce. Fried fruit.  Meats and other protein foods  · Fatty cuts of meat. Ribs, chicken wings, ruelas, sausage, bologna, salami, chitterlings, fatback, hot dogs, bratwurst, and packaged  lunch meats. Liver and organ meats. Whole eggs and egg yolks. Chicken and turkey with skin. Fried meat.  Dairy  · Whole or 2% milk, cream, half-and-half, and cream cheese. Whole milk cheeses. Whole-fat or sweetened yogurt. Full-fat cheeses. Nondairy creamers and whipped toppings. Processed cheese, cheese spreads, and cheese curds.  Beverages  · Alcohol. Sugar-sweetened drinks such as sodas, lemonade, and fruit drinks.  Fats and oils  · Butter, stick margarine, lard, shortening, ghee, or ruelas fat. Coconut, palm kernel, and palm oils.  Sweets and desserts  · Corn syrup, sugars, honey, and molasses. Candy. Jam and jelly. Syrup. Sweetened cereals. Cookies, pies, cakes, donuts, muffins, and ice cream.  The items listed above may not be a complete list of foods and beverages to avoid. Contact your dietitian for more information.  The items listed above may not be a complete list of foods and beverages [you/your child] should avoid. Contact a dietitian for more information.  Summary  · Choosing the right foods helps keep your fat and cholesterol at normal levels. This can keep you from getting certain diseases.  · At meals, fill one-half of your plate with vegetables and green salads.  · Eat high-fiber foods, like whole grains, beans, apples, carrots, peas, and barley.  · Limit added sugar, saturated fats, alcohol, and fried foods.  This information is not intended to replace advice given to you by your health care provider. Make sure you discuss any questions you have with your health care provider.  Document Released: 06/18/2013 Document Revised: 09/04/2018 Document Reviewed: 09/04/2018  [employer] Interactive Patient Education © 2019 [employer]  BMI for Adults    Body mass index (BMI) is a number that is calculated from a person's weight and height. BMI may help to estimate how much of a person's weight is composed of fat. BMI can help identify those who may be at higher risk for certain medical problems.  How is BMI  "used with adults?  BMI is used as a screening tool to identify possible weight problems. It is used to check whether a person is obese, overweight, healthy weight, or underweight.  How is BMI calculated?  BMI measures your weight and compares it to your height. This can be done either in English ([place] or metric measurements. Note that charts are available to help you find your BMI quickly and easily without having to do these calculations yourself.  To calculate your BMI in English ([place] measurements, your health care provider will:  1. Measure your weight in pounds (lb).  2. Multiply the number of pounds by 703.  ? For example, for a person who weighs 180 lb, multiply that number by 703, which equals 126,540.  3. Measure your height in inches (in). Then multiply that number by itself to get a measurement called \"inches squared.\"  ? For example, for a person who is 70 in tall, the \"inches squared\" measurement is 70 in x 70 in, which equals 4900 inches squared.  4. Divide the total from Step 2 (number of lb x 703) by the total from Step 3 (inches squared): 126,540 ÷ 4900 = 25.8. This is your BMI.  To calculate your BMI in metric measurements, your health care provider will:  1. Measure your weight in kilograms (kg).  2. Measure your height in meters (m). Then multiply that number by itself to get a measurement called \"meters squared.\"  ? For example, for a person who is 1.75 m tall, the \"meters squared\" measurement is 1.75 m x 1.75 m, which is equal to 3.1 meters squared.  3. Divide the number of kilograms (your weight) by the meters squared number. In this example: 70 ÷ 3.1 = 22.6. This is your BMI.  How is BMI interpreted?  To interpret your results, your health care provider will use BMI charts to identify whether you are underweight, normal weight, overweight, or obese. The following guidelines will be used:  · Underweight: BMI less than 18.5.  · Normal weight: BMI between 18.5 and 24.9.  · Overweight: BMI " between 25 and 29.9.  · Obese: BMI of 30 and above.  Please note:  · Weight includes both fat and muscle, so someone with a muscular build, such as an athlete, may have a BMI that is higher than 24.9. In cases like these, BMI is not an accurate measure of body fat.  · To determine if excess body fat is the cause of a BMI of 25 or higher, further assessments may need to be done by a health care provider.  · BMI is usually interpreted in the same way for men and women.  Why is BMI a useful tool?  BMI is useful in two ways:  · Identifying a weight problem that may be related to a medical condition, or that may increase the risk for medical problems.  · Promoting lifestyle and diet changes in order to reach a healthy weight.  Summary  · Body mass index (BMI) is a number that is calculated from a person's weight and height.  · BMI may help to estimate how much of a person's weight is composed of fat. BMI can help identify those who may be at higher risk for certain medical problems.  · BMI can be measured using English measurements or metric measurements.  · To interpret your results, your health care provider will use BMI charts to identify whether you are underweight, normal weight, overweight, or obese.  This information is not intended to replace advice given to you by your health care provider. Make sure you discuss any questions you have with your health care provider.  Document Released: 08/29/2005 Document Revised: 10/31/2018 Document Reviewed: 10/31/2018  [employer] Interactive Patient Education © 2019 [employer]    Nonspecific Chest Pain  Chest pain can be caused by many different conditions. It can be caused by something serious that needs treatment right away. This includes:  · Heart attack.  · A tear in the body's main blood vessel.  · Redness and swelling (inflammation) around your heart.  · Blood clot in your lungs.  It can be caused by something that is not as serious. This  includes:  · Heartburn.  · Anxiety or stress.  · Damage to bones or muscles in your chest.  · Lung infections.  See your doctor right away if you have chest pain. This is important.  Follow these instructions at home:  Medicines  · Take over-the-counter and prescription medicines only as told by your doctor.  · If you were prescribed an antibiotic medicine, take it as told by your doctor. Do not stop taking the antibiotic even if you start to feel better.  Lifestyle    · Rest as told by your doctor.  · Do not use any products that contain nicotine or tobacco, such as cigarettes and e-cigarettes. If you need help quitting, ask your doctor.  · Do not drink alcohol.  · Make lifestyle changes as told by your doctor. These may include:  ? Getting regular exercise. Ask your doctor what activities are safe for you.  ? Eating a heart-healthy diet. A diet and nutrition specialist (dietitian) can help you to learn healthy eating options.  ? Staying at a healthy weight.  ? Managing diabetes, if needed.  ? Lowering your stress. Activities such as yoga and relaxation techniques can help.  General instructions  · Avoid any activities that cause you to have chest pain.  · Keep all follow-up visits as told by your doctor. This is important. You may need more testing if your chest pain does not go away.  Contact a doctor if:  · Your chest pain does not go away.  · You feel depressed.  · You have a fever.  Get help right away if:  · Your chest pain is worse.  · You have a cough that gets worse, or you cough up blood.  · You have very bad (severe) pain in your belly (abdomen).  · You pass out (faint).  · You have either of these for no clear reason:  ? Sudden chest discomfort.  ? Sudden discomfort in your arms, back, neck, or jaw.  · You have shortness of breath at any time.  · You suddenly start to sweat, or your skin gets clammy.  · You feel sick to your stomach (nauseous).  · You throw up (vomit).  · You suddenly feel lightheaded  or dizzy.  · You feel very weak or tired.  · Your heart starts to beat fast, or it feels like it is skipping beats.  These symptoms may be an emergency. Do not wait to see if the symptoms will go away. Get medical help right away. Call your local emergency services (911 in the [place] Do not drive yourself to the hospital.  Summary  · Chest pain can be caused by many different conditions. The cause may be serious and need treatment right away. If you have chest pain, see your doctor right away.  · Follow your doctor's instructions for taking medicines and making lifestyle changes. Keep all follow-up visits as told by your doctor. This includes visits for any further testing if your chest pain does not go away.  · Know what signs mean you should get medical help right away.  This information is not intended to replace advice given to you by your health care provider. Make sure you discuss any questions you have with your health care provider.  Document Released: 06/05/2009 Document Revised: 02/08/2019 Document Reviewed: 02/08/2019  [employer] Interactive Patient Education © 2019 [employer]

## 2019-08-28 NOTE — PROGRESS NOTES
Subjective   Darnell Abarca is a 64 y.o. male     Chief Complaint   Patient presents with   • Follow-up     Cath F/U    • Coronary Artery Disease       HPI    Problem List:     1. CAD  1.1 CABG @ [facility] distant past  1.2 [facility] 6/2015 - patient grafts with disease in the native vessels with medical management recommendation; EF 40%  1.3 stress test 12/10/18-apical and inferoapical ischemia, depressed post stress EF 44%  1.4 left heart cath 7/9/19-left main 20%, LAD 60 to 70% stenosis, patent LIMA to distal LAD which also has diffuse distal vessel disease, first obtuse marginal branch 100% occluded with a patent saphenous vein graft to the posterior lateral branches of the left circumflex, right coronary artery 100% occluded the graft to this vessel is also occluded however there is collateral filling to the left circumflex system EF 40%, systolic hypertension  2. Ischemic Cardiomyopathy   2.1 Echo 8/1/2014 - EF 50-55%; DD I; mild LVH; mild TR; trace SD; PA 30-35  2.2 Echo 4/20/17-mild LVH, EF 45-50%, diastolic dysfunction 1, mild MR, mild TR, PA 30-35, dilation of aortic root  2.3 Echo 12/10/18-EF 50-55%, mild LVH, diastolic dysfunction 1, mild dilated aortic root 3.8 trace to mild TR, trace to mild MR  3. CHF; class II-III symptoms   4. Dyslipidemia   5. Orthostatic hypotension  6. COPD  7. History of throat CA  8. Event Monitor 5/1-5/14/17 - SB - NSR with PVC  9. Carotid Artery Disease   9.1 Carotid Artery US 5/8/17 - AMALIA 16-49%; 30th 50%; = or < 50% left common carotid artery; antegrade flow both vertebral arteries   10. Smoking Habituation     Patient is a 64-year-old male who presents today for follow-up status post left heart cath with his grandson/son at his side.  He says shortly after the cath he had a significant episode of left anterior sharp pain that took him to [facility].  He says he was given IV nitroglycerin and it resolved.  He says he was watching TV prior to that happening.  He says he has  not had any further episodes like this.  Patient says that he does get some soreness in his left anterior chest that has a little bit of tightness with it however when I palpate the area it was very tender to touch.  He denies any palpitations, fluttering, presyncope, syncope, orthopnea, or PND.  He says if his blood pressure gets too high or too low he will get dizzy/lightheaded.  He says he has some swelling just in his right knee.  I assess his knee and he does have looks like a fluid pocket on the right lateral side and then also right medial lower part of the knee.  He says he is following up with PCP regarding this.  Patient says he does get short of breath with normal activity.  Patient says that his isosorbide has been decreased due to his significant hypertension.  He has been taking his Florinef not as it is been prescribed.  He will go back to taking it as prescribed but he will take it separate from his isosorbide.  We will also go back on his statin and he is he stopped that as well.  He says he is only smoking 1 cigarette a day.    We went over left heart cath.  I advised him have came and look his cath films.  If he continues to have chest discomfort he potentially may be up to stent the 60 to 70% in the LAD.  Patient did say his PCP is going refer him to pulmonary.    Current Outpatient Medications   Medication Sig Dispense Refill   • atorvastatin (LIPITOR) 80 MG tablet Take 1 tablet by mouth Every Night. 30 tablet 11   • clopidogrel (PLAVIX) 75 MG tablet Take 1 tablet by mouth Daily. 30 tablet 11   • FERROUS SULFATE PO Take 325 mg by mouth.     • fludrocortisone 0.1 MG tablet Take 1 tablet by mouth Daily. 1 tab by mouth on Monday,Wednesday, Friday, Sunday 16 tablet 5   • gabapentin (NEURONTIN) 100 MG capsule Take 1 capsule by mouth 3 (Three) Times a Day.     • isosorbide mononitrate (IMDUR) 30 MG 24 hr tablet Take 30 mg by mouth Daily.     • nitroglycerin (NITROSTAT) 0.4 MG SL tablet Place  under  the tongue.     • nystatin (MYCOSTATIN) 793614 UNIT/ML suspension Swish and swallow 500,000 Units 4 (Four) Times a Day.     • omeprazole (PRILOSEC) 20 MG capsule Take 1 capsule by mouth daily.     • oxyCODONE-acetaminophen (PERCOCET)  MG per tablet Take 1 tablet by mouth Every 8 (Eight) Hours As Needed.     • potassium chloride (K-DUR,KLOR-CON) 20 MEQ CR tablet Take 1 tablet by mouth 2 (Two) Times a Day. 60 tablet 3   • ranolazine (RANEXA) 1000 MG 12 hr tablet Take 1,000 mg by mouth 2 (Two) Times a Day.     • tamsulosin (FLOMAX) 0.4 MG capsule 24 hr capsule Take 1 capsule by mouth Every Night.       No current facility-administered medications for this visit.        ALLERGIES    Aspirin; Codeine; Ethanolamine; Nsaids; Other; Salicylates; Theophylline; and Triprolidine-pseudoephedrine    Past Medical History:   Diagnosis Date   • CAD (coronary artery disease)    • COPD (chronic obstructive pulmonary disease) (CMS/HCC)    • Current every day smoker     1.5-2 PPD   • Dizziness    • Dyslipidemia    • Edema    • History of throat cancer    • Hyperlipidemia    • Hypertension    • Ischemic cardiomyopathy    • Lightheaded    • Myocardial infarction (CMS/HCC)    • Orthostatic hypotension    • SOB (shortness of breath)    • Stroke (CMS/HCC)        Social History     Socioeconomic History   • Marital status:      Spouse name: Not on file   • Number of children: Not on file   • Years of education: Not on file   • Highest education level: Not on file   Tobacco Use   • Smoking status: Former Smoker     Packs/day: 2.00     Types: Cigarettes     Last attempt to quit: 2019     Years since quittin.1   • Smokeless tobacco: Never Used   Substance and Sexual Activity   • Alcohol use: Yes     Comment: occasionally   • Drug use: No   • Sexual activity: Defer       Family History   Problem Relation Age of Onset   • Diabetes Mother    • Hypertension Mother    • Hypertension Father    • Hyperlipidemia Sister          "Familial hypercholesterolemia   • Hypertension Sister    • Hyperlipidemia Brother         Familial hypercholesterolemia   • Heart attack Brother         Acute MI   • Stroke Brother         CVA   • Hypertension Brother    • Heart disease Brother         pacemaker placement   • Other Brother         Pacemaker placement       Review of Systems   Constitutional: Positive for fatigue. Negative for diaphoresis.   HENT: Positive for congestion and hearing loss ([place]). Negative for rhinorrhea and sore throat.    Eyes: Positive for visual disturbance (glasses daily ).   Respiratory: Positive for chest tightness (mainly on left side; he says it was sore and when I pressed it tender to touch ) and shortness of breath (With normal daily activity; with CP ).    Cardiovascular: Positive for chest pain (left anterior sharp [facility] given IV nitro resolved ; watching tv before) and leg swelling (right knee since cath  ). Negative for palpitations.   Gastrointestinal: Positive for nausea (if BP is too low or too high ). Negative for abdominal pain, blood in stool, constipation, diarrhea and vomiting.   Endocrine: Positive for cold intolerance. Negative for heat intolerance.   Genitourinary: Positive for frequency (mainly at night ). Negative for difficulty urinating, dysuria, hematuria and urgency.   Musculoskeletal: Positive for arthralgias, back pain and neck pain.        Pt c/o right knee pain    Skin: Negative for rash and wound.   Allergic/Immunologic: Positive for environmental allergies. Negative for food allergies.   Neurological: Positive for dizziness (if BP is too high or too low ), light-headedness and headaches (if BP is too low or too high ). Negative for syncope, weakness and numbness.   Hematological: Bruises/bleeds easily.   Psychiatric/Behavioral: Positive for sleep disturbance (Wakes up to urinate at night ).       Objective   /78   Pulse 73   Ht 175.3 cm (69\")   Wt 92.4 kg (203 lb 12.8 oz)   SpO2 98%   BMI " "30.10 kg/m²   Vitals:    08/28/19 1248   BP: 122/78   Pulse: 73   SpO2: 98%   Weight: 92.4 kg (203 lb 12.8 oz)   Height: 175.3 cm (69\")      Lab Results (most recent)     None        Physical Exam   Constitutional: He is oriented to person, place, and time. Vital signs are normal. He appears well-developed and well-nourished. He is active and cooperative.   HENT:   Head: Normocephalic.   Mouth/Throat: Abnormal dentition (edentulous ).   Eyes: Lids are normal.   Wears glasses    Neck: Normal carotid pulses, no hepatojugular reflux and no JVD present. Carotid bruit is not present.   Cardiovascular: Normal rate, regular rhythm and normal heart sounds.   Pulses:       Radial pulses are 2+ on the right side, and 2+ on the left side.        Dorsalis pedis pulses are 2+ on the right side, and 2+ on the left side.        Posterior tibial pulses are 2+ on the right side, and 2+ on the left side.   No edema BLE   Pulmonary/Chest: Effort normal and breath sounds normal.   Abdominal: Normal appearance and bowel sounds are normal.   Neurological: He is alert and oriented to person, place, and time.   Skin: Skin is warm, dry and intact.   Psychiatric: He has a normal mood and affect. His speech is normal and behavior is normal. Judgment and thought content normal. Cognition and memory are normal.       Procedure   Procedures         Assessment/Plan      Diagnosis Plan   1. Coronary artery disease involving coronary bypass graft of native heart with angina pectoris (CMS/HCC)     2. Generalized ischemic myocardial dysfunction     3. Essential hypertension     4. Orthostatic hypotension  fludrocortisone 0.1 MG tablet   5. Shortness of breath     6. Current every day smoker     7. Dyslipidemia         Return in about 3 months (around 11/28/2019).    CAD-patient is on Plavix and statin.  Ischemic cardiomyopathy-patient's EF is 40%.  He is currently on no medication for this due to severe orthostatic hypotension.  Hypertension-again " patient is not on any medication specifically for blood pressure.  Orthostatic hypotension-he is on Florinef 4 times a week.  Shortness of breath-stable.  Smoking-patient is working on cessation.  Dyslipidemia-patient is going back on his Lipitor.  Chest pain-patient feels like he has not had any further episodes since going to [facility].  He will use nitro PRN for chest pain no resolution he will go to the ER.  He will follow-up in 3 months or sooner if any changes.    I will have Dr. Gates review his cath film.     Patient's Body mass index is 30.1 kg/m². BMI is above normal parameters. Recommendations include: educational material.      Electronically signed by:

## 2019-09-03 DIAGNOSIS — I95.1 ORTHOSTATIC HYPOTENSION: Chronic | ICD-10-CM

## 2019-09-03 RX ORDER — FLUDROCORTISONE ACETATE 0.1 MG/1
0.1 TABLET ORAL DAILY
Qty: 16 TABLET | Refills: 5 | Status: SHIPPED | OUTPATIENT
Start: 2019-09-03 | End: 2019-09-09 | Stop reason: SDUPTHER

## 2019-09-03 NOTE — TELEPHONE ENCOUNTER
Fax from [facility] requesting a refill on Fludrocortisone.  This had been sent to [facility] last week. New rx sent to [facility] today.  ANNETTA JONES

## 2019-09-09 DIAGNOSIS — I95.1 ORTHOSTATIC HYPOTENSION: Chronic | ICD-10-CM

## 2019-09-09 RX ORDER — FLUDROCORTISONE ACETATE 0.1 MG/1
0.1 TABLET ORAL DAILY
Qty: 16 TABLET | Refills: 5 | Status: SHIPPED | OUTPATIENT
Start: 2019-09-09 | End: 2019-12-16 | Stop reason: SDUPTHER

## 2019-12-16 ENCOUNTER — OFFICE VISIT (OUTPATIENT)
Dept: CARDIOLOGY | Facility: CLINIC | Age: 64
End: 2019-12-16

## 2019-12-16 VITALS
OXYGEN SATURATION: 96 % | BODY MASS INDEX: 31.49 KG/M2 | DIASTOLIC BLOOD PRESSURE: 75 MMHG | SYSTOLIC BLOOD PRESSURE: 113 MMHG | HEIGHT: 69 IN | WEIGHT: 212.6 LBS | HEART RATE: 65 BPM

## 2019-12-16 DIAGNOSIS — E87.6 HYPOKALEMIA: ICD-10-CM

## 2019-12-16 DIAGNOSIS — R42 DIZZINESS: ICD-10-CM

## 2019-12-16 DIAGNOSIS — E78.5 DYSLIPIDEMIA: Chronic | ICD-10-CM

## 2019-12-16 DIAGNOSIS — I10 ESSENTIAL HYPERTENSION: Chronic | ICD-10-CM

## 2019-12-16 DIAGNOSIS — I25.709 CORONARY ARTERY DISEASE INVOLVING CORONARY BYPASS GRAFT OF NATIVE HEART WITH ANGINA PECTORIS (HCC): Primary | ICD-10-CM

## 2019-12-16 DIAGNOSIS — I25.810 CORONARY ARTERY DISEASE INVOLVING CORONARY BYPASS GRAFT OF NATIVE HEART WITHOUT ANGINA PECTORIS: ICD-10-CM

## 2019-12-16 DIAGNOSIS — I95.1 ORTHOSTATIC HYPOTENSION: Chronic | ICD-10-CM

## 2019-12-16 DIAGNOSIS — E78.5 HYPERLIPIDEMIA, UNSPECIFIED HYPERLIPIDEMIA TYPE: ICD-10-CM

## 2019-12-16 DIAGNOSIS — I65.23 BILATERAL CAROTID ARTERY STENOSIS: ICD-10-CM

## 2019-12-16 DIAGNOSIS — F17.200 CURRENT EVERY DAY SMOKER: Chronic | ICD-10-CM

## 2019-12-16 DIAGNOSIS — R06.02 SHORTNESS OF BREATH: ICD-10-CM

## 2019-12-16 PROCEDURE — 99214 OFFICE O/P EST MOD 30 MIN: CPT | Performed by: NURSE PRACTITIONER

## 2019-12-16 PROCEDURE — 93000 ELECTROCARDIOGRAM COMPLETE: CPT | Performed by: NURSE PRACTITIONER

## 2019-12-16 RX ORDER — ATORVASTATIN CALCIUM 80 MG/1
80 TABLET, FILM COATED ORAL NIGHTLY
Qty: 30 TABLET | Refills: 11 | Status: SHIPPED | OUTPATIENT
Start: 2019-12-16 | End: 2020-12-21

## 2019-12-16 RX ORDER — FLUDROCORTISONE ACETATE 0.1 MG/1
0.1 TABLET ORAL DAILY
Qty: 20 TABLET | Refills: 11 | Status: SHIPPED | OUTPATIENT
Start: 2019-12-16 | End: 2020-12-21

## 2019-12-16 RX ORDER — CLOPIDOGREL BISULFATE 75 MG/1
75 TABLET ORAL DAILY
Qty: 30 TABLET | Refills: 11 | Status: SHIPPED | OUTPATIENT
Start: 2019-12-16 | End: 2020-12-21

## 2019-12-16 RX ORDER — POTASSIUM CHLORIDE 20 MEQ/1
20 TABLET, EXTENDED RELEASE ORAL 2 TIMES DAILY
Qty: 60 TABLET | Refills: 11 | Status: SHIPPED | OUTPATIENT
Start: 2019-12-16 | End: 2021-02-04 | Stop reason: SDUPTHER

## 2019-12-16 NOTE — PATIENT INSTRUCTIONS
"Fat and Cholesterol Restricted Eating Plan  Getting too much fat and cholesterol in your diet may cause health problems. Choosing the right foods helps keep your fat and cholesterol at normal levels. This can keep you from getting certain diseases.  Your doctor may recommend an eating plan that includes:  · Total fat: ______% or less of total calories a day.  · Saturated fat: ______% or less of total calories a day.  · Cholesterol: less than _________mg a day.  · Fiber: ______g a day.  What are tips for following this plan?  Meal planning  · At meals, divide your plate into four equal parts:  ? Fill one-half of your plate with vegetables and green salads.  ? Fill one-fourth of your plate with whole grains.  ? Fill one-fourth of your plate with low-fat (lean) protein foods.  · Eat fish that is high in omega-3 fats at least two times a week. This includes mackerel, tuna, sardines, and salmon.  · Eat foods that are high in fiber, such as whole grains, beans, apples, broccoli, carrots, peas, and barley.  General tips    · Work with your doctor to lose weight if you need to.  · Avoid:  ? Foods with added sugar.  ? Fried foods.  ? Foods with partially hydrogenated oils.  · Limit alcohol intake to no more than 1 drink a day for nonpregnant women and 2 drinks a day for men. One drink equals 12 oz of beer, 5 oz of wine, or 1½ oz of hard liquor.  Reading food labels  · Check food labels for:  ? Trans fats.  ? Partially hydrogenated oils.  ? Saturated fat (g) in each serving.  ? Cholesterol (mg) in each serving.  ? Fiber (g) in each serving.  · Choose foods with healthy fats, such as:  ? Monounsaturated fats.  ? Polyunsaturated fats.  ? Omega-3 fats.  · Choose grain products that have whole grains. Look for the word \"whole\" as the first word in the ingredient list.  Cooking  · Cook foods using low-fat methods. These include baking, boiling, grilling, and broiling.  · Eat more home-cooked foods. Eat at restaurants and buffets " less often.  · Avoid cooking using saturated fats, such as butter, cream, palm oil, palm kernel oil, and coconut oil.  Recommended foods    Fruits  · All fresh, canned (in natural juice), or frozen fruits.  Vegetables  · Fresh or frozen vegetables (raw, steamed, roasted, or grilled). Green salads.  Grains  · Whole grains, such as whole wheat or whole grain breads, crackers, cereals, and pasta. Unsweetened oatmeal, bulgur, barley, quinoa, or brown rice. Corn or whole wheat flour tortillas.  Meats and other protein foods  · Ground beef (85% or leaner), grass-fed beef, or beef trimmed of fat. Skinless chicken or turkey. Ground chicken or turkey. Pork trimmed of fat. All fish and seafood. Egg whites. Dried beans, peas, or lentils. Unsalted nuts or seeds. Unsalted canned beans. Nut butters without added sugar or oil.  Dairy  · Low-fat or nonfat dairy products, such as skim or 1% milk, 2% or reduced-fat cheeses, low-fat and fat-free ricotta or cottage cheese, or plain low-fat and nonfat yogurt.  Fats and oils  · Tub margarine without trans fats. Light or reduced-fat mayonnaise and salad dressings. Avocado. Olive, canola, sesame, or safflower oils.  The items listed above may not be a complete list of foods and beverages you can eat. Contact a dietitian for more information.  Foods to avoid  Fruits  · Canned fruit in heavy syrup. Fruit in cream or butter sauce. Fried fruit.  Vegetables  · Vegetables cooked in cheese, cream, or butter sauce. Fried vegetables.  Grains  · White bread. White pasta. White rice. Cornbread. Bagels, pastries, and croissants. Crackers and snack foods that contain trans fat and hydrogenated oils.  Meats and other protein foods  · Fatty cuts of meat. Ribs, chicken wings, ruelas, sausage, bologna, salami, chitterlings, fatback, hot dogs, bratwurst, and packaged lunch meats. Liver and organ meats. Whole eggs and egg yolks. Chicken and turkey with skin. Fried meat.  Dairy  · Whole or 2% milk, cream,  half-and-half, and cream cheese. Whole milk cheeses. Whole-fat or sweetened yogurt. Full-fat cheeses. Nondairy creamers and whipped toppings. Processed cheese, cheese spreads, and cheese curds.  Beverages  · Alcohol. Sugar-sweetened drinks such as sodas, lemonade, and fruit drinks.  Fats and oils  · Butter, stick margarine, lard, shortening, ghee, or ruelas fat. Coconut, palm kernel, and palm oils.  Sweets and desserts  · Corn syrup, sugars, honey, and molasses. Candy. Jam and jelly. Syrup. Sweetened cereals. Cookies, pies, cakes, donuts, muffins, and ice cream.  The items listed above may not be a complete list of foods and beverages you should avoid. Contact a dietitian for more information.  Summary  · Choosing the right foods helps keep your fat and cholesterol at normal levels. This can keep you from getting certain diseases.  · At meals, fill one-half of your plate with vegetables and green salads.  · Eat high-fiber foods, like whole grains, beans, apples, carrots, peas, and barley.  · Limit added sugar, saturated fats, alcohol, and fried foods.  This information is not intended to replace advice given to you by your health care provider. Make sure you discuss any questions you have with your health care provider.  Document Released: 06/18/2013 Document Revised: 08/21/2019 Document Reviewed: 09/04/2018  [employer] Interactive Patient Education © 2019 [employer]  BMI for Adults    Body mass index (BMI) is a number that is calculated from a person's weight and height. BMI may help to estimate how much of a person's weight is composed of fat. BMI can help identify those who may be at higher risk for certain medical problems.  How is BMI used with adults?  BMI is used as a screening tool to identify possible weight problems. It is used to check whether a person is obese, overweight, healthy weight, or underweight.  How is BMI calculated?  BMI measures your weight and compares it to your height. This can be done  "either in English ([place] or metric measurements. Note that charts are available to help you find your BMI quickly and easily without having to do these calculations yourself.  To calculate your BMI in English ([place] measurements, your health care provider will:  1. Measure your weight in pounds (lb).  2. Multiply the number of pounds by 703.  ? For example, for a person who weighs 180 lb, multiply that number by 703, which equals 126,540.  3. Measure your height in inches (in). Then multiply that number by itself to get a measurement called \"inches squared.\"  ? For example, for a person who is 70 in tall, the \"inches squared\" measurement is 70 in x 70 in, which equals 4900 inches squared.  4. Divide the total from Step 2 (number of lb x 703) by the total from Step 3 (inches squared): 126,540 ÷ 4900 = 25.8. This is your BMI.  To calculate your BMI in metric measurements, your health care provider will:  1. Measure your weight in kilograms (kg).  2. Measure your height in meters (m). Then multiply that number by itself to get a measurement called \"meters squared.\"  ? For example, for a person who is 1.75 m tall, the \"meters squared\" measurement is 1.75 m x 1.75 m, which is equal to 3.1 meters squared.  3. Divide the number of kilograms (your weight) by the meters squared number. In this example: 70 ÷ 3.1 = 22.6. This is your BMI.  How is BMI interpreted?  To interpret your results, your health care provider will use BMI charts to identify whether you are underweight, normal weight, overweight, or obese. The following guidelines will be used:  · Underweight: BMI less than 18.5.  · Normal weight: BMI between 18.5 and 24.9.  · Overweight: BMI between 25 and 29.9.  · Obese: BMI of 30 and above.  Please note:  · Weight includes both fat and muscle, so someone with a muscular build, such as an athlete, may have a BMI that is higher than 24.9. In cases like these, BMI is not an accurate measure of body fat.  · To determine " if excess body fat is the cause of a BMI of 25 or higher, further assessments may need to be done by a health care provider.  · BMI is usually interpreted in the same way for men and women.  Why is BMI a useful tool?  BMI is useful in two ways:  · Identifying a weight problem that may be related to a medical condition, or that may increase the risk for medical problems.  · Promoting lifestyle and diet changes in order to reach a healthy weight.  Summary  · Body mass index (BMI) is a number that is calculated from a person's weight and height.  · BMI may help to estimate how much of a person's weight is composed of fat. BMI can help identify those who may be at higher risk for certain medical problems.  · BMI can be measured using English measurements or metric measurements.  · To interpret your results, your health care provider will use BMI charts to identify whether you are underweight, normal weight, overweight, or obese.  This information is not intended to replace advice given to you by your health care provider. Make sure you discuss any questions you have with your health care provider.  Document Released: 08/29/2005 Document Revised: 10/31/2018 Document Reviewed: 10/31/2018  [employer] Interactive Patient Education © 2019 [employer]    What You Need to Know About Electronic Cigarettes  Electronic cigarettes, or e-cigarettes, are battery-operated devices that deliver nicotine to your body. They come in many shapes, including in the shape of a cigarette, pipe, pen, and even a USB memory stick. E-cigarettes have a cartridge that contains a liquid form of nicotine. When you use the device, the liquid heats up. It then becomes a vapor. Inhaling this vapor is called vaping.  While e-cigarettes do not contain tar and the same cancer-causing chemicals that are in tobacco cigarettes, they may contain other harmful and cancer-causing chemicals, such as formaldehyde or acetaldehyde. Nicotine is thought to increase  your risk for certain types of cancer. Many e-cigarettes have chemical colorings and flavorings. It is not clear how much nicotine you get when vaping. The health effects of vaping are not completely known.  Some people may use e-cigarettes in order to quit smoking tobacco. However, this has not been proven to work, and the Food and Drug Administration (FDA) has not approved e-cigarettes for this purpose.  How can using electronic cigarettes affect me?  · E-cigarettes contain nicotine, which is a very addictive drug. Vaping may make you crave nicotine. Nicotine:  ? Changes your blood sugar levels.  ? Increases your heart rate, blood pressure, and breathing rate.  ? Increases your risk of developing blood clots (hypercoagulable state) and diabetes.  · If you smoke e-cigarettes, you may be more likely to start smoking or to smoke more tobacco cigarettes.  · Becoming addicted to nicotine may make your brain more sensitive to other addictive drugs. You may move to other addictive substances.  · If you are pregnant, the nicotine in e-cigarettes may be harmful to your baby. Nicotine can cause:  ? Brain or lung problems for your baby.  ? Your baby to be born too early.  ? Your baby to be born with a low birth weight.  · If you are a child or a teen, vaping may affect your memory or lower your attention span.  · You may be in danger of overdosing on nicotine. Nicotine poisoning can cause nausea, vomiting, seizures, and trouble breathing.  What are the benefits of stopping vaping?  If you stop vaping, you can avoid:  · Getting addicted to nicotine.  · Having nicotine side effects.  · Getting nicotine poisoning.  · Being exposed to dangerous chemicals.  · Increasing your risk of health problems.  · Increasing your baby's risk of health problems, if you are pregnant.  · Being more likely to use other addictive substances.  What steps can I take to stop vaping?  If you can stop vaping on your own, do it before you become  addicted to nicotine. If you need help stopping, ask your health care provider. There are three effective ways to fight nicotine addiction:  · Nicotine replacement therapy. Using nicotine gum or a nicotine patch blocks your craving for nicotine. Over time, you can reduce the amount of nicotine you use until you can stop using nicotine completely without having cravings.  · Prescription medicines approved to fight nicotine addiction. These stop nicotine cravings or block the effects of nicotine.  · Behavioral therapy. This may include:  ? A self-help smoking cessation program.  ? Individual or group therapy.  ? A smoking cessation support group.    Where can I get support?  You can get support at these sites:  · [employer] of Medicine: https://medlineplus.gov/ency/article/836769.htm  · [place] Department of Health and Human Services: https://smokefree.gov  · American Lung Association: http://www.lung.org/stop-smoking/z-ijrd-lv-quit/how-to-quit-smoking.html  Where can I get more information?  Learn more about e-cigarettes from:  · [place] National Institutes of Health: https://www.drugabuse.gov/publications/drugfacts/bxjoixfnfj-flcmmwqpog-y-cigarettes  · [place] Department of Health and Human Services: http://betobaccofree.hhs.gov/about-tobacco/Electronic-Cigarettes  Summary  · E-cigarettes can cause nicotine addiction.  · E-cigarettes are not approved as a way to stop smoking.  · E-cigarettes are not a risk-free alternative to smoking tobacco.  · There are ways to fight nicotine addiction.  · Talk to your health care provider if you are unable to stop vaping on your own.  This information is not intended to replace advice given to you by your health care provider. Make sure you discuss any questions you have with your health care provider.  Document Released: 04/10/2017 Document Revised: 09/06/2017 Document Reviewed: 12/09/2016  [employer] Interactive Patient Education © 2019 [employer]

## 2019-12-16 NOTE — PROGRESS NOTES
Subjective   Darnell Abarca is a 64 y.o. male     Chief Complaint   Patient presents with   • Follow-up   • Coronary Artery Disease       HPI    Problem List:     1. CAD  1.1 CABG @ [facility] distant past  1.2 [facility] 6/2015 - patient grafts with disease in the native vessels with medical management recommendation; EF 40%  1.3 stress test 12/10/18-apical and inferoapical ischemia, depressed post stress EF 44%  1.4 left heart cath 7/9/19-left main 20%, LAD 60 to 70% stenosis, patent LIMA to distal LAD which also has diffuse distal vessel disease, first obtuse marginal branch 100% occluded with a patent saphenous vein graft to the posterior lateral branches of the left circumflex, right coronary artery 100% occluded the graft to this vessel is also occluded however there is collateral filling to the left circumflex system EF 40%, systolic hypertension  2. Ischemic Cardiomyopathy   2.1 Echo 8/1/2014 - EF 50-55%; DD I; mild LVH; mild TR; trace ID; PA 30-35  2.2 Echo 4/20/17-mild LVH, EF 45-50%, diastolic dysfunction 1, mild MR, mild TR, PA 30-35, dilation of aortic root  2.3 Echo 12/10/18-EF 50-55%, mild LVH, diastolic dysfunction 1, mild dilated aortic root 3.8 trace to mild TR, trace to mild MR  3. CHF; class II-III symptoms   4. Dyslipidemia   5. Orthostatic hypotension  6. COPD  7. History of throat CA  8. Event Monitor 5/1-5/14/17 - SB - NSR with PVC  9. Carotid Artery Disease   9.1 Carotid Artery US 5/8/17 - AMALIA 16-49%; 30th 50%; = or < 50% left common carotid artery; antegrade flow both vertebral arteries   10. Smoking Habituation     Patient is a 64-year-old male who presents today for follow-up with his grandson at his side.  He denies any chest pain, pressure, palpitations, fluttering, presyncope, syncope, orthopnea or PND.  He still has dizziness and lightheadedness whenever he changes positions but he says that he has a hospital bed now and this is actually helped him quite a bit.  He says he does get swelling but  typically goes down overnight.  He says he is always short of breath but this is not changed.  Patient says he uses a e-cigarette and on occasion will smoke a regular cigarette.    Current Outpatient Medications on File Prior to Visit   Medication Sig Dispense Refill   • FERROUS SULFATE PO Take 325 mg by mouth.     • gabapentin (NEURONTIN) 100 MG capsule Take 1 capsule by mouth 3 (Three) Times a Day.     • isosorbide mononitrate (IMDUR) 30 MG 24 hr tablet Take 30 mg by mouth Daily.     • nitroglycerin (NITROSTAT) 0.4 MG SL tablet Place  under the tongue.     • nystatin (MYCOSTATIN) 312684 UNIT/ML suspension Swish and swallow 500,000 Units 4 (Four) Times a Day.     • omeprazole (PRILOSEC) 20 MG capsule Take 1 capsule by mouth daily.     • oxyCODONE-acetaminophen (PERCOCET)  MG per tablet Take 1 tablet by mouth Every 8 (Eight) Hours As Needed.     • ranolazine (RANEXA) 1000 MG 12 hr tablet Take 1,000 mg by mouth 2 (Two) Times a Day.     • tamsulosin (FLOMAX) 0.4 MG capsule 24 hr capsule Take 1 capsule by mouth Every Night.     • [DISCONTINUED] atorvastatin (LIPITOR) 80 MG tablet Take 1 tablet by mouth Every Night. 30 tablet 11   • [DISCONTINUED] clopidogrel (PLAVIX) 75 MG tablet Take 1 tablet by mouth Daily. 30 tablet 11   • [DISCONTINUED] fludrocortisone 0.1 MG tablet Take 1 tablet by mouth Daily. 1 tab by mouth on Monday,Wednesday, Friday, Sunday 16 tablet 5   • [DISCONTINUED] potassium chloride (K-DUR,KLOR-CON) 20 MEQ CR tablet Take 1 tablet by mouth 2 (Two) Times a Day. 60 tablet 3     No current facility-administered medications on file prior to visit.        ALLERGIES    Aspirin; Codeine; Ethanolamine; Nsaids; Other; Salicylates; Theophylline; and Triprolidine-pseudoephedrine    Past Medical History:   Diagnosis Date   • CAD (coronary artery disease)    • COPD (chronic obstructive pulmonary disease) (CMS/[facility])    • Current every day smoker     1.5-2 PPD   • Dizziness    • Dyslipidemia    • Edema    •  History of throat cancer    • Hyperlipidemia    • Hypertension    • Ischemic cardiomyopathy    • Lightheaded    • Myocardial infarction (CMS/HCC)    • Orthostatic hypotension    • SOB (shortness of breath)    • Stroke (CMS/HCC)        Social History     Socioeconomic History   • Marital status:      Spouse name: Not on file   • Number of children: Not on file   • Years of education: Not on file   • Highest education level: Not on file   Tobacco Use   • Smoking status: Current Every Day Smoker     Packs/day: 2.00     Types: Electronic Cigarette     Last attempt to quit: 2019     Years since quittin.4   • Smokeless tobacco: Never Used   Substance and Sexual Activity   • Alcohol use: Yes     Comment: occasionally   • Drug use: No   • Sexual activity: Defer       Family History   Problem Relation Age of Onset   • Diabetes Mother    • Hypertension Mother    • Hypertension Father    • Hyperlipidemia Sister         Familial hypercholesterolemia   • Hypertension Sister    • Hyperlipidemia Brother         Familial hypercholesterolemia   • Heart attack Brother         Acute MI   • Stroke Brother         CVA   • Hypertension Brother    • Heart disease Brother         pacemaker placement   • Other Brother         Pacemaker placement       Review of Systems   Constitutional: Positive for fatigue. Negative for diaphoresis.   HENT: Positive for congestion, hearing loss and voice change. Negative for rhinorrhea and sore throat.    Eyes: Positive for visual disturbance (glasses daily).   Respiratory: Positive for shortness of breath (always, can be SoA at rest; no change ). Negative for chest tightness.    Cardiovascular: Positive for leg swelling (RLE edema, goes down overnight). Negative for chest pain (Denies CP) and palpitations.   Gastrointestinal: Positive for abdominal pain (2-3 days ago). Negative for blood in stool, constipation, diarrhea, nausea and vomiting.   Endocrine: Negative for cold intolerance and  "heat intolerance.   Genitourinary: Positive for frequency (5-6x at night). Negative for difficulty urinating, dysuria, hematuria and urgency.   Musculoskeletal: Positive for arthralgias, back pain, gait problem (ambulates w/ cane) and neck pain.   Skin: Negative for rash and wound.   Allergic/Immunologic: Negative for environmental allergies and food allergies.   Neurological: Positive for dizziness (orthostatic hypotension), weakness and light-headedness. Negative for syncope, numbness and headaches.   Hematological: Bruises/bleeds easily (both).   Psychiatric/Behavioral: Positive for sleep disturbance (Wakes up a lot to urinate).       Objective   /75   Pulse 65   Ht 175.3 cm (69\")   Wt 96.4 kg (212 lb 9.6 oz)   SpO2 96%   BMI 31.40 kg/m²   Vitals:    12/16/19 1341   BP: 113/75   Pulse: 65   SpO2: 96%   Weight: 96.4 kg (212 lb 9.6 oz)   Height: 175.3 cm (69\")      Lab Results (most recent)     None        Physical Exam   Constitutional: He is oriented to person, place, and time. Vital signs are normal. He appears well-developed and well-nourished. He is active and cooperative.   HENT:   Head: Normocephalic.   Right Ear: Decreased hearing is noted.   Left Ear: Decreased hearing is noted.   Edentulous    Eyes: Lids are normal.   Wears glasses    Neck: Normal carotid pulses, no hepatojugular reflux and no JVD present. Carotid bruit is not present.   Cardiovascular: Normal rate, regular rhythm and normal heart sounds.   Pulses:       Radial pulses are 2+ on the right side, and 2+ on the left side.        Dorsalis pedis pulses are 2+ on the right side, and 2+ on the left side.        Posterior tibial pulses are 2+ on the right side, and 2+ on the left side.   No edema BLE.   Pulmonary/Chest: Effort normal and breath sounds normal.   Abdominal: Normal appearance and bowel sounds are normal.   Neurological: He is alert and oriented to person, place, and time.   Skin: Skin is warm, dry and intact. "   Psychiatric: He has a normal mood and affect. His speech is normal and behavior is normal. Judgment and thought content normal. Cognition and memory are normal.       Procedure     ECG 12 Lead  Date/Time: 12/16/2019 1:53 PM  Performed by: Tess Vergara APRN  Authorized by: Tess Vergara APRN   Comparison: compared with previous ECG from 3/6/2018  Comparison to previous ECG: Left axis deviation   Rhythm: sinus bradycardia  Rate: bradycardic  BPM: 57  QRS axis: left  Other findings: T wave abnormality    Clinical impression: non-specific ECG                 Assessment/Plan      Diagnosis Plan   1. Coronary artery disease involving coronary bypass graft of native heart with angina pectoris (CMS/HCC)  ECG 12 Lead    Adult Transthoracic Echo Complete W/ Cont if Necessary Per Protocol   2. Bilateral carotid artery stenosis  Duplex Carotid Ultrasound CAR   3. Essential hypertension  ECG 12 Lead    Adult Transthoracic Echo Complete W/ Cont if Necessary Per Protocol   4. Orthostatic hypotension  fludrocortisone 0.1 MG tablet    Adult Transthoracic Echo Complete W/ Cont if Necessary Per Protocol   5. Shortness of breath  Adult Transthoracic Echo Complete W/ Cont if Necessary Per Protocol   6. Dyslipidemia  Adult Transthoracic Echo Complete W/ Cont if Necessary Per Protocol   7. Current every day smoker  Adult Transthoracic Echo Complete W/ Cont if Necessary Per Protocol   8. Dizziness  Adult Transthoracic Echo Complete W/ Cont if Necessary Per Protocol    Duplex Carotid Ultrasound CAR   9. Hyperlipidemia, unspecified hyperlipidemia type  atorvastatin (LIPITOR) 80 MG tablet    Adult Transthoracic Echo Complete W/ Cont if Necessary Per Protocol   10. Coronary artery disease involving coronary bypass graft of native heart without angina pectoris  clopidogrel (PLAVIX) 75 MG tablet    Adult Transthoracic Echo Complete W/ Cont if Necessary Per Protocol   11. Hypokalemia  potassium chloride (K-DUR,KLOR-CON) 20 MEQ CR tablet        Return in about 4 months (around 4/16/2020).    CAD-patient is on Plavix and statin.  Bilateral carotid artery disease/dizziness-patient have carotid artery ultrasound.  He is on Plavix and statin.  Orthostatic hypotension-doing well on Florinef.  Shortness of breath-stable.  Dyslipidemia-patient is on Lipitor monitored by PCP.  Hypokalemia-patient is on potassium.  CAD/shortness of breath/hypertension/hyperlipidemia-patient have echocardiogram.  He will continue his medication regimen.  He will follow-up in 4 months or sooner if any changes or any abnormalities on his testing.    Darnell Abarca is a current electronic cigarettes user.  He currently smokes 1 pack of cigarettes, cigarette and electronic cigarette per day for a duration of 1 years. I have educated him on the risk of diseases from using tobacco products such as cancer, COPD and heart diease.     I advised him to quit and he is trying to quit.    I spent 3  minutes counseling the patient.            Patient's Body mass index is 31.4 kg/m². BMI is above normal parameters. Recommendations include: educational material.      Electronically signed by:

## 2019-12-30 ENCOUNTER — HOSPITAL ENCOUNTER (OUTPATIENT)
Dept: CARDIOLOGY | Facility: HOSPITAL | Age: 64
Discharge: HOME | End: 2019-12-30
Admitting: NURSE PRACTITIONER

## 2019-12-30 ENCOUNTER — HOSPITAL ENCOUNTER (OUTPATIENT)
Dept: CARDIOLOGY | Facility: HOSPITAL | Age: 64
Discharge: HOME | End: 2019-12-30

## 2019-12-30 DIAGNOSIS — I65.23 BILATERAL CAROTID ARTERY STENOSIS: ICD-10-CM

## 2019-12-30 DIAGNOSIS — I25.709 CORONARY ARTERY DISEASE INVOLVING CORONARY BYPASS GRAFT OF NATIVE HEART WITH ANGINA PECTORIS (HCC): ICD-10-CM

## 2019-12-30 DIAGNOSIS — R42 DIZZINESS: ICD-10-CM

## 2019-12-30 DIAGNOSIS — I25.810 CORONARY ARTERY DISEASE INVOLVING CORONARY BYPASS GRAFT OF NATIVE HEART WITHOUT ANGINA PECTORIS: ICD-10-CM

## 2019-12-30 DIAGNOSIS — R06.02 SHORTNESS OF BREATH: ICD-10-CM

## 2019-12-30 DIAGNOSIS — E78.5 DYSLIPIDEMIA: ICD-10-CM

## 2019-12-30 DIAGNOSIS — F17.200 CURRENT EVERY DAY SMOKER: ICD-10-CM

## 2019-12-30 DIAGNOSIS — E78.5 HYPERLIPIDEMIA, UNSPECIFIED HYPERLIPIDEMIA TYPE: ICD-10-CM

## 2019-12-30 DIAGNOSIS — I95.1 ORTHOSTATIC HYPOTENSION: ICD-10-CM

## 2019-12-30 DIAGNOSIS — I10 ESSENTIAL HYPERTENSION: ICD-10-CM

## 2019-12-30 PROCEDURE — 93306 TTE W/DOPPLER COMPLETE: CPT | Performed by: INTERNAL MEDICINE

## 2019-12-30 PROCEDURE — 93880 EXTRACRANIAL BILAT STUDY: CPT

## 2019-12-30 PROCEDURE — 93306 TTE W/DOPPLER COMPLETE: CPT

## 2019-12-30 PROCEDURE — 93880 EXTRACRANIAL BILAT STUDY: CPT | Performed by: INTERNAL MEDICINE

## 2020-01-08 LAB
BH CV ECHO MEAS - ACS: 2.4 CM
BH CV ECHO MEAS - AO MAX PG: 5.9 MMHG
BH CV ECHO MEAS - AO MEAN PG: 3 MMHG
BH CV ECHO MEAS - AO ROOT AREA (BSA CORRECTED): 2
BH CV ECHO MEAS - AO ROOT AREA: 13.5 CM^2
BH CV ECHO MEAS - AO ROOT DIAM: 4.2 CM
BH CV ECHO MEAS - AO V2 MAX: 121 CM/SEC
BH CV ECHO MEAS - AO V2 MEAN: 80.5 CM/SEC
BH CV ECHO MEAS - AO V2 VTI: 25.9 CM
BH CV ECHO MEAS - BSA(HAYCOCK): 2.2 M^2
BH CV ECHO MEAS - BSA(HAYCOCK): 2.2 M^2
BH CV ECHO MEAS - BSA: 2.1 M^2
BH CV ECHO MEAS - BSA: 2.1 M^2
BH CV ECHO MEAS - BZI_BMI: 31.3 KILOGRAMS/M^2
BH CV ECHO MEAS - BZI_BMI: 31.3 KILOGRAMS/M^2
BH CV ECHO MEAS - BZI_METRIC_HEIGHT: 175.3 CM
BH CV ECHO MEAS - BZI_METRIC_HEIGHT: 175.3 CM
BH CV ECHO MEAS - BZI_METRIC_WEIGHT: 96.2 KG
BH CV ECHO MEAS - BZI_METRIC_WEIGHT: 96.2 KG
BH CV ECHO MEAS - EDV(CUBED): 163.7 ML
BH CV ECHO MEAS - EDV(MOD-SP4): 121 ML
BH CV ECHO MEAS - EDV(TEICH): 145.6 ML
BH CV ECHO MEAS - EF(CUBED): 60 %
BH CV ECHO MEAS - EF(MOD-SP4): 64 %
BH CV ECHO MEAS - EF(TEICH): 51.1 %
BH CV ECHO MEAS - ESV(CUBED): 65.5 ML
BH CV ECHO MEAS - ESV(MOD-SP4): 43.5 ML
BH CV ECHO MEAS - ESV(TEICH): 71.3 ML
BH CV ECHO MEAS - FS: 26.3 %
BH CV ECHO MEAS - IVS/LVPW: 0.85
BH CV ECHO MEAS - IVSD: 1.3 CM
BH CV ECHO MEAS - LA DIMENSION: 4.3 CM
BH CV ECHO MEAS - LA/AO: 1
BH CV ECHO MEAS - LV DIASTOLIC VOL/BSA (35-75): 57.1 ML/M^2
BH CV ECHO MEAS - LV IVRT: 0.12 SEC
BH CV ECHO MEAS - LV MASS(C)D: 324.8 GRAMS
BH CV ECHO MEAS - LV MASS(C)DI: 153.4 GRAMS/M^2
BH CV ECHO MEAS - LV SYSTOLIC VOL/BSA (12-30): 20.5 ML/M^2
BH CV ECHO MEAS - LVIDD: 5.5 CM
BH CV ECHO MEAS - LVIDS: 4 CM
BH CV ECHO MEAS - LVLD AP4: 8.2 CM
BH CV ECHO MEAS - LVLS AP4: 7 CM
BH CV ECHO MEAS - LVOT AREA (M): 3.8 CM^2
BH CV ECHO MEAS - LVOT AREA: 3.8 CM^2
BH CV ECHO MEAS - LVOT DIAM: 2.2 CM
BH CV ECHO MEAS - LVPWD: 1.5 CM
BH CV ECHO MEAS - MV A MAX VEL: 77.5 CM/SEC
BH CV ECHO MEAS - MV DEC SLOPE: 128 CM/SEC^2
BH CV ECHO MEAS - MV E MAX VEL: 54.1 CM/SEC
BH CV ECHO MEAS - MV E/A: 0.7
BH CV ECHO MEAS - RVDD: 3.8 CM
BH CV ECHO MEAS - SI(AO): 165.5 ML/M^2
BH CV ECHO MEAS - SI(CUBED): 46.4 ML/M^2
BH CV ECHO MEAS - SI(MOD-SP4): 36.6 ML/M^2
BH CV ECHO MEAS - SI(TEICH): 35.1 ML/M^2
BH CV ECHO MEAS - SV(AO): 350.3 ML
BH CV ECHO MEAS - SV(CUBED): 98.2 ML
BH CV ECHO MEAS - SV(MOD-SP4): 77.5 ML
BH CV ECHO MEAS - SV(TEICH): 74.3 ML
BH CV XLRA MEAS LEFT BULB EDV: -11.3 CM/SEC
BH CV XLRA MEAS LEFT BULB PSV: -52.6 CM/SEC
BH CV XLRA MEAS LEFT CCA RATIO VEL: -73.6 CM/SEC
BH CV XLRA MEAS LEFT DIST CCA EDV: -19.9 CM/SEC
BH CV XLRA MEAS LEFT DIST CCA PSV: -73.6 CM/SEC
BH CV XLRA MEAS LEFT DIST ICA EDV: -22 CM/SEC
BH CV XLRA MEAS LEFT DIST ICA PSV: -62.6 CM/SEC
BH CV XLRA MEAS LEFT ICA RATIO VEL: -141 CM/SEC
BH CV XLRA MEAS LEFT ICA/CCA RATIO: 1.9
BH CV XLRA MEAS LEFT MID ICA EDV: -27.5 CM/SEC
BH CV XLRA MEAS LEFT MID ICA PSV: -69.4 CM/SEC
BH CV XLRA MEAS LEFT PROX CCA EDV: 18.6 CM/SEC
BH CV XLRA MEAS LEFT PROX CCA PSV: 112 CM/SEC
BH CV XLRA MEAS LEFT PROX ECA EDV: -12.9 CM/SEC
BH CV XLRA MEAS LEFT PROX ECA PSV: -104 CM/SEC
BH CV XLRA MEAS LEFT PROX ICA EDV: -28.9 CM/SEC
BH CV XLRA MEAS LEFT PROX ICA PSV: -141 CM/SEC
BH CV XLRA MEAS LEFT VERTEBRAL A EDV: 7.6 CM/SEC
BH CV XLRA MEAS LEFT VERTEBRAL A PSV: 38.6 CM/SEC
BH CV XLRA MEAS RIGHT BULB EDV: -14.5 CM/SEC
BH CV XLRA MEAS RIGHT BULB PSV: -43.5 CM/SEC
BH CV XLRA MEAS RIGHT CCA RATIO VEL: 63.9 CM/SEC
BH CV XLRA MEAS RIGHT DIST CCA EDV: 15.6 CM/SEC
BH CV XLRA MEAS RIGHT DIST CCA PSV: 63.9 CM/SEC
BH CV XLRA MEAS RIGHT DIST ICA EDV: -24.2 CM/SEC
BH CV XLRA MEAS RIGHT DIST ICA PSV: -54.8 CM/SEC
BH CV XLRA MEAS RIGHT ICA RATIO VEL: -73 CM/SEC
BH CV XLRA MEAS RIGHT ICA/CCA RATIO: -1.1
BH CV XLRA MEAS RIGHT MID ICA EDV: -23.1 CM/SEC
BH CV XLRA MEAS RIGHT MID ICA PSV: -71.4 CM/SEC
BH CV XLRA MEAS RIGHT PROX CCA EDV: 12 CM/SEC
BH CV XLRA MEAS RIGHT PROX CCA PSV: 80.8 CM/SEC
BH CV XLRA MEAS RIGHT PROX ECA EDV: -14 CM/SEC
BH CV XLRA MEAS RIGHT PROX ECA PSV: -83.8 CM/SEC
BH CV XLRA MEAS RIGHT PROX ICA EDV: -17.2 CM/SEC
BH CV XLRA MEAS RIGHT PROX ICA PSV: -73 CM/SEC
BH CV XLRA MEAS RIGHT VERTEBRAL A EDV: 14.5 CM/SEC
BH CV XLRA MEAS RIGHT VERTEBRAL A PSV: 34.4 CM/SEC
MAXIMAL PREDICTED HEART RATE: 154 BPM
STRESS TARGET HR: 131 BPM

## 2020-01-09 ENCOUNTER — TELEPHONE (OUTPATIENT)
Dept: CARDIOLOGY | Facility: CLINIC | Age: 65
End: 2020-01-09

## 2020-01-09 DIAGNOSIS — I71.20 THORACIC AORTIC ANEURYSM WITHOUT RUPTURE (HCC): Primary | ICD-10-CM

## 2020-01-09 NOTE — TELEPHONE ENCOUNTER
Called and notified patients sister of echo and carotid results, notified they will be calling with appt time and date for CT to eval aneurysm. -BH;[facility]    ----- Message from EARLINE Amrenta sent at 1/9/2020  6:39 AM EST -----  Please advise patient.  Needs CTA of Chest to assess thoracic aortic aneurysm.  Will need BMP as well.  Thanks!  Adult Transthoracic Echo Complete W/ Cont if Necessary Per Protocol   Order: [id number]   Status:  Final result   Visible to patient:  No (Not Released) Dx:  Orthostatic hypotension; Shortness of...   Details     Reading Physician Reading Date Result Priority   Jose Gates MD 12/30/2019 Routine      Result Text     1.  LV size, function, and wall motion are normal.  There is mild to moderate concentric left ventricular hypertrophy.  Visually estimated ejection fraction is 50%.  There is grade 1 diastolic dysfunction with mild left atrial enlargement.  Right heart chambers are normal.  No septal defect mass or thrombus.     2.  The mitral valve is normal with no mitral stenosis and trivial MR.  The aortic valve is trileaflet and normally configured.  The aortic annulus is mildly dilated.  There is no aortic stenosis or significant aortic insufficiency.  Right-sided heart valves are normal.     3.  No pericardial effusion.  The proximal aortic root is mildly dilated at 4.2 cm with no dissection plane or aneurysmal segment identified.     4.  Pulmonary artery systolic pressures cannot be calculated.           Result Notes for Duplex Carotid Ultrasound CAR     Notes recorded by Tess Vergara APRN on 1/9/2020 at 6:41 AM EST  Please advise patient.  On plavix and statin.   Duplex Carotid Ultrasound CAR   Order: [id number]   Status:  Final result   Visible to patient:  No (Not Released) Dx:  Dizziness; Bilateral carotid artery s...   Details     Reading Physician Reading Date Result Priority   Jose Gates MD 12/30/2019 Routine      Result Text     1.  Both common  carotid arteries are widely patent although there is scattered nonobstructive plaque noted throughout the right common carotid artery.     2.  Mild bifurcation disease on the right with 15% or lesser stenosis by echo or Doppler sampling.  Moderate bifurcation disease on the left with 16 to 49% stenosis by Doppler and relatively dense fibrotic and mildly calcific plaque.     3.  The right internal carotid artery is widely patent and is without significant atherosclerotic involvement by echo or Doppler sampling.  Dopplers compatible with 15 or lesser percent stenosis.  There is stenosis approaching 50% at the ostium and extending into the proximal portion of the left internal carotid artery.  The distal vessel demonstrates diffuse nonobstructive disease by both echo and Doppler sampling.     4.  Antegrade flow is demonstrated in both vertebral arteries.     Summary: Moderate but nonobstructive bifurcation disease bilaterally, somewhat more prominent on the left where 16 to 49% stenosis of the bifurcation and proximal internal carotid artery is identified.  Antegrade flow in both vertebral arteries.

## 2020-02-09 ENCOUNTER — RESULTS ENCOUNTER (OUTPATIENT)
Dept: CARDIOLOGY | Facility: CLINIC | Age: 65
End: 2020-02-09

## 2020-02-09 DIAGNOSIS — I71.20 THORACIC AORTIC ANEURYSM WITHOUT RUPTURE (HCC): ICD-10-CM

## 2020-04-27 ENCOUNTER — OFFICE VISIT (OUTPATIENT)
Dept: CARDIOLOGY | Facility: CLINIC | Age: 65
End: 2020-04-27

## 2020-04-27 VITALS
SYSTOLIC BLOOD PRESSURE: 121 MMHG | BODY MASS INDEX: 30.81 KG/M2 | DIASTOLIC BLOOD PRESSURE: 85 MMHG | HEIGHT: 69 IN | HEART RATE: 69 BPM | WEIGHT: 208 LBS

## 2020-04-27 DIAGNOSIS — E78.5 DYSLIPIDEMIA: Chronic | ICD-10-CM

## 2020-04-27 DIAGNOSIS — F17.200 CURRENT EVERY DAY SMOKER: Chronic | ICD-10-CM

## 2020-04-27 DIAGNOSIS — I25.5 GENERALIZED ISCHEMIC MYOCARDIAL DYSFUNCTION: ICD-10-CM

## 2020-04-27 DIAGNOSIS — R06.02 SHORTNESS OF BREATH: ICD-10-CM

## 2020-04-27 DIAGNOSIS — I95.1 ORTHOSTATIC HYPOTENSION: Chronic | ICD-10-CM

## 2020-04-27 DIAGNOSIS — I25.709 CORONARY ARTERY DISEASE INVOLVING CORONARY BYPASS GRAFT OF NATIVE HEART WITH ANGINA PECTORIS (HCC): Primary | ICD-10-CM

## 2020-04-27 DIAGNOSIS — I10 ESSENTIAL HYPERTENSION: Chronic | ICD-10-CM

## 2020-04-27 DIAGNOSIS — I65.23 BILATERAL CAROTID ARTERY STENOSIS: ICD-10-CM

## 2020-04-27 PROCEDURE — 99441 PR PHYS/QHP TELEPHONE EVALUATION 5-10 MIN: CPT | Performed by: NURSE PRACTITIONER

## 2020-04-27 RX ORDER — NALOXONE HYDROCHLORIDE 4 MG/.1ML
SPRAY NASAL TAKE AS DIRECTED
COMMUNITY
Start: 2020-03-02 | End: 2021-11-09

## 2020-04-27 RX ORDER — FUROSEMIDE 20 MG/1
20 TABLET ORAL DAILY
COMMUNITY
Start: 2020-04-20 | End: 2020-10-29

## 2020-04-27 RX ORDER — CEPHALEXIN 500 MG/1
500 CAPSULE ORAL 3 TIMES DAILY
COMMUNITY
Start: 2020-04-22 | End: 2020-10-29

## 2020-04-27 RX ORDER — DICYCLOMINE HYDROCHLORIDE 10 MG/1
10 CAPSULE ORAL DAILY
COMMUNITY
Start: 2020-04-08 | End: 2020-10-29

## 2020-04-27 NOTE — PATIENT INSTRUCTIONS
For more information:    Quit Now [place]  1-800-QUIT-NOW  https://[place].quitlogix.org/en-[place]/  Steps to Quit Smoking  Smoking tobacco can be harmful to your health and can affect almost every organ in your body. Smoking puts you, and those around you, at risk for developing many serious chronic diseases. Quitting smoking is difficult, but it is one of the best things that you can do for your health. It is never too late to quit.  What are the benefits of quitting smoking?  When you quit smoking, you lower your risk of developing serious diseases and conditions, such as:  · Lung cancer or lung disease, such as COPD.  · Heart disease.  · Stroke.  · Heart attack.  · Infertility.  · Osteoporosis and bone fractures.  Additionally, symptoms such as coughing, wheezing, and shortness of breath may get better when you quit. You may also find that you get sick less often because your body is stronger at fighting off colds and infections. If you are pregnant, quitting smoking can help to reduce your chances of having a baby of low birth weight.  How do I get ready to quit?  When you decide to quit smoking, create a plan to make sure that you are successful. Before you quit:  · Pick a date to quit. Set a date within the next two weeks to give you time to prepare.  · Write down the reasons why you are quitting. Keep this list in places where you will see it often, such as on your bathroom mirror or in your car or wallet.  · Identify the people, places, things, and activities that make you want to smoke (triggers) and avoid them. Make sure to take these actions:  ¨ Throw away all cigarettes at home, at work, and in your car.  ¨ Throw away smoking accessories, such as ashtrays and lighters.  ¨ Clean your car and make sure to empty the ashtray.  ¨ Clean your home, including curtains and carpets.  · Tell your family, friends, and coworkers that you are quitting. Support from your loved ones can make quitting easier.  · Talk with  your health care provider about your options for quitting smoking.  · Find out what treatment options are covered by your health insurance.  What strategies can I use to quit smoking?  Talk with your healthcare provider about different strategies to quit smoking. Some strategies include:  · Quitting smoking altogether instead of gradually lessening how much you smoke over a period of time. Research shows that quitting “cold turkey” is more successful than gradually quitting.  · Attending in-person counseling to help you build problem-solving skills. You are more likely to have success in quitting if you attend several counseling sessions. Even short sessions of 10 minutes can be effective.  · Finding resources and support systems that can help you to quit smoking and remain smoke-free after you quit. These resources are most helpful when you use them often. They can include:  ¨ Online chats with a counselor.  ¨ Telephone quitlines.  ¨ Printed self-help materials.  ¨ Support groups or group counseling.  ¨ Text messaging programs.  ¨ Mobile phone applications.  · Taking medicines to help you quit smoking. (If you are pregnant or breastfeeding, talk with your health care provider first.) Some medicines contain nicotine and some do not. Both types of medicines help with cravings, but the medicines that include nicotine help to relieve withdrawal symptoms. Your health care provider may recommend:  ¨ Nicotine patches, gum, or lozenges.  ¨ Nicotine inhalers or sprays.  ¨ Non-nicotine medicine that is taken by mouth.  Talk with your health care provider about combining strategies, such as taking medicines while you are also receiving in-person counseling. Using these two strategies together makes you more likely to succeed in quitting than if you used either strategy on its own.  If you are pregnant or breastfeeding, talk with your health care provider about finding counseling or other support strategies to quit smoking. Do  not take medicine to help you quit smoking unless told to do so by your health care provider.  What things can I do to make it easier to quit?  Quitting smoking might feel overwhelming at first, but there is a lot that you can do to make it easier. Take these important actions:  · Reach out to your family and friends and ask that they support and encourage you during this time. Call telephone quitlines, reach out to support groups, or work with a counselor for support.  · Ask people who smoke to avoid smoking around you.  · Avoid places that trigger you to smoke, such as bars, parties, or smoke-break areas at work.  · Spend time around people who do not smoke.  · Lessen stress in your life, because stress can be a smoking trigger for some people. To lessen stress, try:  ¨ Exercising regularly.  ¨ Deep-breathing exercises.  ¨ Yoga.  ¨ Meditating.  ¨ Performing a body scan. This involves closing your eyes, scanning your body from head to toe, and noticing which parts of your body are particularly tense. Purposefully relax the muscles in those areas.  · Download or purchase mobile phone or tablet apps (applications) that can help you stick to your quit plan by providing reminders, tips, and encouragement. There are many free apps, such as QuitGuide from the CDC (Centers for Disease Control and Prevention). You can find other support for quitting smoking (smoking cessation) through smokefree.gov and other websites.  How will I feel when I quit smoking?  Within the first 24 hours of quitting smoking, you may start to feel some withdrawal symptoms. These symptoms are usually most noticeable 2-3 days after quitting, but they usually do not last beyond 2-3 weeks. Changes or symptoms that you might experience include:  · Mood swings.  · Restlessness, anxiety, or irritation.  · Difficulty concentrating.  · Dizziness.  · Strong cravings for sugary foods in addition to nicotine.  · Mild weight  gain.  · Constipation.  · Nausea.  · Coughing or a sore throat.  · Changes in how your medicines work in your body.  · A depressed mood.  · Difficulty sleeping (insomnia).  After the first 2-3 weeks of quitting, you may start to notice more positive results, such as:  · Improved sense of smell and taste.  · Decreased coughing and sore throat.  · Slower heart rate.  · Lower blood pressure.  · Clearer skin.  · The ability to breathe more easily.  · Fewer sick days.  Quitting smoking is very challenging for most people. Do not get discouraged if you are not successful the first time. Some people need to make many attempts to quit before they achieve long-term success. Do your best to stick to your quit plan, and talk with your health care provider if you have any questions or concerns.  This information is not intended to replace advice given to you by your health care provider. Make sure you discuss any questions you have with your health care provider.  Document Released: 12/12/2002 Document Revised: 08/15/2017 Document Reviewed: 05/03/2016  [employer] Interactive Patient Education © 2017 [employer]  BMI for Adults    Body mass index (BMI) is a number that is calculated from a person's weight and height. BMI may help to estimate how much of a person's weight is composed of fat. BMI can help identify those who may be at higher risk for certain medical problems.  How is BMI used with adults?  BMI is used as a screening tool to identify possible weight problems. It is used to check whether a person is obese, overweight, healthy weight, or underweight.  How is BMI calculated?  BMI measures your weight and compares it to your height. This can be done either in English (U.S[place] or metric measurements. Note that charts are available to help you find your BMI quickly and easily without having to do these calculations yourself.  To calculate your BMI in English (U.S[place] measurements, your health care provider will:  1. Measure  "your weight in pounds (lb).  2. Multiply the number of pounds by 703.  ? For example, for a person who weighs 180 lb, multiply that number by 703, which equals 126,540.  3. Measure your height in inches (in). Then multiply that number by itself to get a measurement called \"inches squared.\"  ? For example, for a person who is 70 in tall, the \"inches squared\" measurement is 70 in x 70 in, which equals 4900 inches squared.  4. Divide the total from Step 2 (number of lb x 703) by the total from Step 3 (inches squared): 126,540 ÷ 4900 = 25.8. This is your BMI.  To calculate your BMI in metric measurements, your health care provider will:  1. Measure your weight in kilograms (kg).  2. Measure your height in meters (m). Then multiply that number by itself to get a measurement called \"meters squared.\"  ? For example, for a person who is 1.75 m tall, the \"meters squared\" measurement is 1.75 m x 1.75 m, which is equal to 3.1 meters squared.  3. Divide the number of kilograms (your weight) by the meters squared number. In this example: 70 ÷ 3.1 = 22.6. This is your BMI.  How is BMI interpreted?  To interpret your results, your health care provider will use BMI charts to identify whether you are underweight, normal weight, overweight, or obese. The following guidelines will be used:  · Underweight: BMI less than 18.5.  · Normal weight: BMI between 18.5 and 24.9.  · Overweight: BMI between 25 and 29.9.  · Obese: BMI of 30 and above.  Please note:  · Weight includes both fat and muscle, so someone with a muscular build, such as an athlete, may have a BMI that is higher than 24.9. In cases like these, BMI is not an accurate measure of body fat.  · To determine if excess body fat is the cause of a BMI of 25 or higher, further assessments may need to be done by a health care provider.  · BMI is usually interpreted in the same way for men and women.  Why is BMI a useful tool?  BMI is useful in two ways:  · Identifying a weight " problem that may be related to a medical condition, or that may increase the risk for medical problems.  · Promoting lifestyle and diet changes in order to reach a healthy weight.  Summary  · Body mass index (BMI) is a number that is calculated from a person's weight and height.  · BMI may help to estimate how much of a person's weight is composed of fat. BMI can help identify those who may be at higher risk for certain medical problems.  · BMI can be measured using English measurements or metric measurements.  · To interpret your results, your health care provider will use BMI charts to identify whether you are underweight, normal weight, overweight, or obese.  This information is not intended to replace advice given to you by your health care provider. Make sure you discuss any questions you have with your health care provider.  Document Released: 08/29/2005 Document Revised: 10/31/2018 Document Reviewed: 10/31/2018  [employer] Interactive Patient Education © 2020 [employer]
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
## 2020-06-04 ENCOUNTER — TELEPHONE (OUTPATIENT)
Dept: CARDIOLOGY | Facility: CLINIC | Age: 65
End: 2020-06-04

## 2020-10-29 ENCOUNTER — LAB (OUTPATIENT)
Dept: CARDIOLOGY | Facility: CLINIC | Age: 65
End: 2020-10-29

## 2020-10-29 ENCOUNTER — OFFICE VISIT (OUTPATIENT)
Dept: CARDIOLOGY | Facility: CLINIC | Age: 65
End: 2020-10-29

## 2020-10-29 VITALS
BODY MASS INDEX: 29.62 KG/M2 | HEIGHT: 69 IN | SYSTOLIC BLOOD PRESSURE: 105 MMHG | HEART RATE: 61 BPM | WEIGHT: 200 LBS | DIASTOLIC BLOOD PRESSURE: 70 MMHG | OXYGEN SATURATION: 98 %

## 2020-10-29 DIAGNOSIS — I25.709 CORONARY ARTERY DISEASE INVOLVING CORONARY BYPASS GRAFT OF NATIVE HEART WITH ANGINA PECTORIS (HCC): Primary | ICD-10-CM

## 2020-10-29 DIAGNOSIS — Z00.00 HEALTHCARE MAINTENANCE: ICD-10-CM

## 2020-10-29 DIAGNOSIS — R06.02 SHORTNESS OF BREATH: ICD-10-CM

## 2020-10-29 DIAGNOSIS — E83.42 HYPOMAGNESEMIA: ICD-10-CM

## 2020-10-29 DIAGNOSIS — E78.5 DYSLIPIDEMIA: Chronic | ICD-10-CM

## 2020-10-29 DIAGNOSIS — R00.2 PALPITATIONS: ICD-10-CM

## 2020-10-29 DIAGNOSIS — R42 DIZZINESS: ICD-10-CM

## 2020-10-29 DIAGNOSIS — I10 ESSENTIAL HYPERTENSION: Chronic | ICD-10-CM

## 2020-10-29 DIAGNOSIS — F41.9 ANXIETY: ICD-10-CM

## 2020-10-29 DIAGNOSIS — I25.5 ISCHEMIC CARDIOMYOPATHY: Chronic | ICD-10-CM

## 2020-10-29 DIAGNOSIS — J43.9 PULMONARY EMPHYSEMA, UNSPECIFIED EMPHYSEMA TYPE (HCC): ICD-10-CM

## 2020-10-29 DIAGNOSIS — I95.1 ORTHOSTATIC HYPOTENSION: Chronic | ICD-10-CM

## 2020-10-29 DIAGNOSIS — I65.23 BILATERAL CAROTID ARTERY STENOSIS: ICD-10-CM

## 2020-10-29 DIAGNOSIS — R60.9 PERIPHERAL EDEMA: ICD-10-CM

## 2020-10-29 DIAGNOSIS — F17.200 CURRENT EVERY DAY SMOKER: Chronic | ICD-10-CM

## 2020-10-29 PROBLEM — R60.0 PERIPHERAL EDEMA: Status: ACTIVE | Noted: 2020-10-29

## 2020-10-29 LAB
ANION GAP SERPL CALCULATED.3IONS-SCNC: 11.2 MMOL/L (ref 5–15)
BUN SERPL-MCNC: 11 MG/DL (ref 8–23)
BUN/CREAT SERPL: 7.4 (ref 7–25)
CALCIUM SPEC-SCNC: 8.8 MG/DL (ref 8.6–10.5)
CHLORIDE SERPL-SCNC: 102 MMOL/L (ref 98–107)
CO2 SERPL-SCNC: 22.8 MMOL/L (ref 22–29)
CREAT SERPL-MCNC: 1.48 MG/DL (ref 0.76–1.27)
GFR SERPL CREATININE-BSD FRML MDRD: 47 ML/MIN/1.73
GLUCOSE SERPL-MCNC: 94 MG/DL (ref 65–99)
MAGNESIUM SERPL-MCNC: 2 MG/DL (ref 1.6–2.4)
NT-PROBNP SERPL-MCNC: 2109 PG/ML (ref 0–900)
POTASSIUM SERPL-SCNC: 5.5 MMOL/L (ref 3.5–5.2)
SODIUM SERPL-SCNC: 136 MMOL/L (ref 136–145)

## 2020-10-29 PROCEDURE — 80048 BASIC METABOLIC PNL TOTAL CA: CPT | Performed by: NURSE PRACTITIONER

## 2020-10-29 PROCEDURE — 83880 ASSAY OF NATRIURETIC PEPTIDE: CPT | Performed by: NURSE PRACTITIONER

## 2020-10-29 PROCEDURE — 83735 ASSAY OF MAGNESIUM: CPT | Performed by: NURSE PRACTITIONER

## 2020-10-29 PROCEDURE — 36415 COLL VENOUS BLD VENIPUNCTURE: CPT

## 2020-10-29 PROCEDURE — 99214 OFFICE O/P EST MOD 30 MIN: CPT | Performed by: NURSE PRACTITIONER

## 2020-10-29 RX ORDER — HYDROXYZINE HYDROCHLORIDE 25 MG/1
25 TABLET, FILM COATED ORAL EVERY 6 HOURS PRN
COMMUNITY
End: 2020-10-29 | Stop reason: SDUPTHER

## 2020-10-29 RX ORDER — ALBUTEROL SULFATE 2.5 MG/3ML
2.5 SOLUTION RESPIRATORY (INHALATION) EVERY 4 HOURS PRN
COMMUNITY
End: 2020-10-29 | Stop reason: SDUPTHER

## 2020-10-29 RX ORDER — ALBUTEROL SULFATE 90 UG/1
2 AEROSOL, METERED RESPIRATORY (INHALATION) EVERY 4 HOURS PRN
COMMUNITY
End: 2020-10-29 | Stop reason: SDUPTHER

## 2020-10-29 RX ORDER — MULTIVITAMIN WITH IRON
250 TABLET ORAL DAILY
COMMUNITY
End: 2021-02-04 | Stop reason: SDUPTHER

## 2020-10-29 RX ORDER — ALBUTEROL SULFATE 2.5 MG/3ML
2.5 SOLUTION RESPIRATORY (INHALATION) EVERY 4 HOURS PRN
Qty: 3 ML | Refills: 12 | Status: SHIPPED | OUTPATIENT
Start: 2020-10-29 | End: 2022-09-01 | Stop reason: SDUPTHER

## 2020-10-29 RX ORDER — ALBUTEROL SULFATE 90 UG/1
2 AEROSOL, METERED RESPIRATORY (INHALATION) EVERY 4 HOURS PRN
Qty: 8 G | Refills: 11 | Status: SHIPPED | OUTPATIENT
Start: 2020-10-29 | End: 2021-02-04 | Stop reason: SDUPTHER

## 2020-10-29 RX ORDER — PROMETHAZINE HYDROCHLORIDE 25 MG/1
25 TABLET ORAL EVERY 6 HOURS PRN
COMMUNITY

## 2020-10-29 RX ORDER — HYDROXYZINE HYDROCHLORIDE 25 MG/1
25 TABLET, FILM COATED ORAL EVERY 6 HOURS PRN
Qty: 45 TABLET | Refills: 5 | Status: SHIPPED | OUTPATIENT
Start: 2020-10-29 | End: 2021-02-04

## 2020-10-29 NOTE — PROGRESS NOTES
Subjective   Darnell Abarca is a 65 y.o. male     Chief Complaint   Patient presents with   • Follow-up     six month follow up       HPI    Problem List:     1. CAD  1.1 CABG @ [facility] distant past  1.2 [facility] 6/2015 - patient grafts with disease in the native vessels with medical management recommendation; EF 40%  1.3 stress test 12/10/18-apical and inferoapical ischemia, depressed post stress EF 44%  1.4 left heart cath 7/9/19-left main 20%, LAD 60 to 70% stenosis, patent LIMA to distal LAD which also has diffuse distal vessel disease, first obtuse marginal branch 100% occluded with a patent saphenous vein graft to the posterior lateral branches of the left circumflex, right coronary artery 100% occluded the graft to this vessel is also occluded however there is collateral filling to the left circumflex system EF 40%, systolic hypertension  2. Ischemic Cardiomyopathy   2.1 Echo 8/1/2014 - EF 50-55%; DD I; mild LVH; mild TR; trace WI; PA 30-35  2.2 Echo 4/20/17-mild LVH, EF 45-50%, diastolic dysfunction 1, mild MR, mild TR, PA 30-35, dilation of aortic root  2.3 Echo 12/10/18-EF 50-55%, mild LVH, diastolic dysfunction 1, mild dilated aortic root 3.8 trace to mild TR, trace to mild MR  2.4 echo 12/30/2019-EF 50%, mild to moderate LVH, diastolic dysfunction 1, mildly dilated proximal aortic root at 4.2 cm  3. CHF; class II-III symptoms   4. Dyslipidemia   5. Orthostatic hypotension  6. COPD  7. History of throat CA  8. Event Monitor 5/1-5/14/17 - SB - NSR with PVC  9. Carotid Artery Disease   9.1 Carotid Artery US 5/8/17 - AMALIA 16-49%; 30th 50%; = or < 50% left common carotid artery; antegrade flow both vertebral arteries   9.2 carotid artery ultrasound 12/30/2019-mild bifurcation disease on right less than 15% stenosis, moderate bifurcation disease on left 16 to 49% stenosis, 50% less stenosis on the right internal carotid artery and approximately 50% stenosis of the ostium and extending into the proximal portion of the  left internal carotid artery, antegrade flow of both vertebral arteries  10. Smoking Habituation   11.  Thoracic aortic aneurysm  11.1 CT of the chest 1/10/2020 20-4 cm ascending aortic aneurysm    Patient is a 65-year-old male who presents today for follow-up with his daughter at his side.  He has been doing [facility] few times due to low magnesium.  He denies any chest pain or pressure.  He said he was having some racing but ever since going to the hospital and getting IV mag as well as fluids that has completely resolved.  He does still have some dizziness and lightheadedness with position change.  He denies any presyncope, syncope, orthopnea, PND or edema.  He says he does get shortness of breath but he says it is only at times when he gets really anxious.  He does stay fatigued.  He was only borderline Ortho today.    Current Outpatient Medications on File Prior to Visit   Medication Sig Dispense Refill   • atorvastatin (LIPITOR) 80 MG tablet Take 1 tablet by mouth Every Night. 30 tablet 11   • clopidogrel (PLAVIX) 75 MG tablet Take 1 tablet by mouth Daily. 30 tablet 11   • Cyanocobalamin 1000 MCG/ML kit Inject 2 mL as directed Every 30 (Thirty) Days.     • fludrocortisone 0.1 MG tablet Take 1 tablet by mouth Daily. 1 tab by mouth on Monday,Wednesday, Friday, Sunday 20 tablet 11   • isosorbide mononitrate (IMDUR) 30 MG 24 hr tablet Take 30 mg by mouth Daily.     • Magnesium 250 MG tablet Take 250 mg by mouth Daily.     • NARCAN 4 MG/0.1ML nasal spray Take As Directed.     • nitroglycerin (NITROSTAT) 0.4 MG SL tablet Place 0.4 mg under the tongue Every 5 (Five) Minutes As Needed for Chest Pain.     • omeprazole (PrilOSEC) 40 MG capsule Take 40 mg by mouth Daily.     • oxyCODONE-acetaminophen (PERCOCET)  MG per tablet Take 1 tablet by mouth Every 8 (Eight) Hours As Needed.     • potassium chloride (K-DUR,KLOR-CON) 20 MEQ CR tablet Take 1 tablet by mouth 2 (Two) Times a Day. (Patient taking  differently: Take 20 mEq by mouth Daily.) 60 tablet 11   • promethazine (PHENERGAN) 25 MG tablet Take 25 mg by mouth Every 6 (Six) Hours As Needed for Nausea or Vomiting.     • tamsulosin (FLOMAX) 0.4 MG capsule 24 hr capsule Take 1 capsule by mouth Every Night.     • [DISCONTINUED] albuterol (PROVENTIL) (2.5 MG/3ML) 0.083% nebulizer solution Take 2.5 mg by nebulization Every 4 (Four) Hours As Needed for Wheezing.     • [DISCONTINUED] albuterol sulfate  (90 Base) MCG/ACT inhaler Inhale 2 puffs Every 4 (Four) Hours As Needed for Wheezing or Shortness of Air.     • [DISCONTINUED] hydrOXYzine (ATARAX) 25 MG tablet Take 25 mg by mouth Every 6 (Six) Hours As Needed for Anxiety.     • [DISCONTINUED] cephalexin (KEFLEX) 500 MG capsule Take 500 mg by mouth 3 (Three) Times a Day.     • [DISCONTINUED] dicyclomine (BENTYL) 10 MG capsule Take 10 mg by mouth Daily.     • [DISCONTINUED] furosemide (LASIX) 20 MG tablet Take 20 mg by mouth Daily.     • [DISCONTINUED] ranolazine (RANEXA) 1000 MG 12 hr tablet Take 1,000 mg by mouth 2 (Two) Times a Day.       No current facility-administered medications on file prior to visit.        ALLERGIES    Aspirin, Codeine, Ethanolamine, Nsaids, Other, Salicylates, Theophylline, and Triprolidine-pseudoephedrine    Past Medical History:   Diagnosis Date   • CAD (coronary artery disease)    • COPD (chronic obstructive pulmonary disease) (CMS/HCC)    • Current every day smoker     1.5-2 PPD   • Dizziness    • Dyslipidemia    • Edema    • History of throat cancer    • Hyperlipidemia    • Hypertension    • Ischemic cardiomyopathy    • Lightheaded    • Myocardial infarction (CMS/HCC)    • Orthostatic hypotension    • SOB (shortness of breath)    • Stroke (CMS/HCC)        Social History     Socioeconomic History   • Marital status:      Spouse name: Not on file   • Number of children: Not on file   • Years of education: Not on file   • Highest education level: Not on file   Tobacco Use    • Smoking status: Current Every Day Smoker     Packs/day: 2.00     Years: 40.00     Pack years: 80.00     Types: Electronic Cigarette, Cigarettes     Last attempt to quit: 2019     Years since quittin.3   • Smokeless tobacco: Never Used   Substance and Sexual Activity   • Alcohol use: Yes     Comment: occasionally   • Drug use: No   • Sexual activity: Defer       Family History   Problem Relation Age of Onset   • Diabetes Mother    • Hypertension Mother    • Hypertension Father    • Hyperlipidemia Sister         Familial hypercholesterolemia   • Hypertension Sister    • Hyperlipidemia Brother         Familial hypercholesterolemia   • Heart attack Brother         Acute MI   • Stroke Brother         CVA   • Hypertension Brother    • Heart disease Brother         pacemaker placement   • Other Brother         Pacemaker placement       Review of Systems   Constitutional: Positive for fatigue (stays fatigued). Negative for chills, diaphoresis and fever.   HENT: Positive for sore throat (off and on for three days) and voice change (for a very long time ). Negative for congestion and rhinorrhea.    Eyes: Positive for visual disturbance (glasses).   Respiratory: Positive for cough, shortness of breath (at times- with nerves) and wheezing. Negative for chest tightness.    Cardiovascular: Positive for palpitations (racing; better since getting mag ). Negative for chest pain and leg swelling.   Gastrointestinal: Positive for nausea (with blood pressure problems). Negative for abdominal pain, blood in stool and vomiting.   Endocrine: Negative for cold intolerance and heat intolerance.        Night sweats   Genitourinary: Negative for dysuria, frequency, hematuria and urgency.   Musculoskeletal: Positive for back pain (lower back), gait problem (uses a cane ) and neck pain. Negative for arthralgias.        Tingling sensation BLE   Skin: Negative for rash and wound.   Allergic/Immunologic: Negative for environmental  "allergies and food allergies.   Neurological: Positive for dizziness (with position change ), weakness and light-headedness (when moving positions).   Hematological: Bruises/bleeds easily.   Psychiatric/Behavioral: Negative for sleep disturbance (denies waking with smothering at night).       Objective   /70 (BP Location: Left arm, Patient Position: Standing)   Pulse 61   Ht 175.3 cm (69\")   Wt 90.7 kg (200 lb)   SpO2 98%   BMI 29.53 kg/m²   Vitals:    10/29/20 1011 10/29/20 1012 10/29/20 1015 10/29/20 1016   BP: 131/83 114/77 110/75 105/70   BP Location: Left arm Left arm  Left arm   Patient Position: Sitting Lying  Standing   Pulse: 60 60 56 61   SpO2: 99% 98% 99% 98%   Weight: 90.7 kg (200 lb)   90.7 kg (200 lb)   Height: 175.3 cm (69\")   175.3 cm (69\")      Lab Results (most recent)     None        Physical Exam  Vitals signs reviewed.   Constitutional:       General: He is awake.      Appearance: Normal appearance. He is well-developed, well-groomed and overweight.   HENT:      Head: Normocephalic.   Eyes:      General: Lids are normal.      Comments: Wears glasses    Neck:      Vascular: No carotid bruit, hepatojugular reflux or JVD.   Cardiovascular:      Rate and Rhythm: Normal rate and regular rhythm.      Pulses:           Radial pulses are 2+ on the right side and 2+ on the left side.        Dorsalis pedis pulses are 2+ on the right side and 2+ on the left side.        Posterior tibial pulses are 2+ on the right side and 2+ on the left side.      Heart sounds: Normal heart sounds.   Pulmonary:      Effort: Pulmonary effort is normal.      Breath sounds: Normal air entry. Examination of the right-upper field reveals wheezing. Examination of the left-upper field reveals wheezing. Examination of the right-middle field reveals wheezing. Examination of the left-middle field reveals wheezing. Examination of the right-lower field reveals wheezing. Examination of the left-lower field reveals wheezing. " Wheezing (inspiratory and expiratory ) present.   Musculoskeletal:      Right lower leg: No edema.      Left lower leg: No edema.      Comments: Uses a cane    Skin:     General: Skin is warm and dry.   Neurological:      Mental Status: He is alert and oriented to person, place, and time.   Psychiatric:         Attention and Perception: Attention and perception normal.         Mood and Affect: Mood and affect normal.         Speech: Speech normal.         Behavior: Behavior normal. Behavior is cooperative.         Thought Content: Thought content normal.         Cognition and Memory: Cognition and memory normal.         Judgment: Judgment normal.         Procedure   Procedures         Assessment/Plan      Diagnosis Plan   1. Coronary artery disease involving coronary bypass graft of native heart with angina pectoris (CMS/[facility])  Adult Transthoracic Echo Complete W/ Cont if Necessary Per Protocol   2. Palpitations  Adult Transthoracic Echo Complete W/ Cont if Necessary Per Protocol   3. Orthostatic hypotension     4. Ischemic cardiomyopathy     5. Essential hypertension  Basic Metabolic Panel   6. Shortness of breath  BNP    Adult Transthoracic Echo Complete W/ Cont if Necessary Per Protocol    albuterol (PROVENTIL) (2.5 MG/3ML) 0.083% nebulizer solution    albuterol sulfate  (90 Base) MCG/ACT inhaler   7. Dyslipidemia     8. Current every day smoker     9. Bilateral carotid artery stenosis  Duplex Carotid Ultrasound CAR   10. Peripheral edema     11. Dizziness  Duplex Carotid Ultrasound CAR   12. Anxiety  hydrOXYzine (ATARAX) 25 MG tablet   13. Hypomagnesemia  Magnesium   14. Healthcare maintenance  Basic Metabolic Panel    Magnesium   15. Pulmonary emphysema, unspecified emphysema type (CMS/[facility])  albuterol (PROVENTIL) (2.5 MG/3ML) 0.083% nebulizer solution    albuterol sulfate  (90 Base) MCG/ACT inhaler       Return in about 3 months (around 1/29/2021).    CAD-patient is on Plavix and statin.   Palpitations-resolved with magnesium replacement.  Orthostatic hypotension-stable on Florinef.  Ischemic cardiomyopathy-patient currently is not on any medications for this.  Hypertension-patient is no longer on medications and doing well without them.  Shortness of breath-patient will get a BNP.  Dyslipidemia-patient is on Lipitor monitored by PCP.  Carotid artery disease/dizziness-patient is on Plavix and statin but will have a repeat carotid artery ultrasound.  Anxiety-PCP previously prescribed Atarax and he never got it.  I am going to resend in and he will try and see if that helps.  Hypomagnesium-patient is on magnesium.  He will get a BMP, mag and BNP today.  CAD/palpitations/ischemic cardiomyopathy/shortness of breath-patient will repeat echocardiogram.  He will continue his medication regimen for now.  He is good to monitor his blood pressure and hour after taking medication.  He will follow-up in 3 months or sooner if any changes or abnormalities with testing.  He is due for a repeat CTA of the chest in January.  I also refilled his nebulizer and inhaler for him due to his significant wheezing.       Darnell Abarca  reports that he has been smoking electronic cigarette and cigarettes. He has a 80.00 pack-year smoking history. He has never used smokeless tobacco.. I have educated him on the risk of diseases from using tobacco products such as cancer, COPD and heart disease.     I advised him to quit and he is not willing to quit.    I spent 3  minutes counseling the patient.         Patient's Body mass index is 29.53 kg/m². BMI is above normal parameters. Recommendations include: educational material and referral to primary care.    Advance Care Planning   ACP discussion was held with the patient during this visit. Patient does not have an advance directive, information provided.      Electronically signed by:

## 2020-10-29 NOTE — PATIENT INSTRUCTIONS
Smoking Tobacco Information, Adult  Smoking tobacco can be harmful to your health. Tobacco contains a poisonous (toxic), colorless chemical called nicotine. Nicotine is addictive. It changes the brain and can make it hard to stop smoking. Tobacco also has other toxic chemicals that can hurt your body and raise your risk of many cancers.  How can smoking tobacco affect me?  Smoking tobacco puts you at risk for:  · Cancer. Smoking is most commonly associated with lung cancer, but can also lead to cancer in other parts of the body.  · Chronic obstructive pulmonary disease (COPD). This is a long-term lung condition that makes it hard to breathe. It also gets worse over time.  · High blood pressure (hypertension), heart disease, stroke, or heart attack.  · Lung infections, such as pneumonia.  · Cataracts. This is when the lenses in the eyes become clouded.  · Digestive problems. This may include peptic ulcers, heartburn, and gastroesophageal reflux disease (GERD).  · Oral health problems, such as gum disease and tooth loss.  · Loss of taste and smell.  Smoking can affect your appearance by causing:  · Wrinkles.  · Yellow or stained teeth, fingers, and fingernails.  Smoking tobacco can also affect your social life, because:  · It may be challenging to find places to smoke when away from home. Many workplaces, restaurants, hotels, and public places are tobacco-free.  · Smoking is expensive. This is due to the cost of tobacco and the long-term costs of treating health problems from smoking.  · Secondhand smoke may affect those around you. Secondhand smoke can cause lung cancer, breathing problems, and heart disease. Children of smokers have a higher risk for:  ? Sudden infant death syndrome (SIDS).  ? Ear infections.  ? Lung infections.  If you currently smoke tobacco, quitting now can help you:  · Lead a longer and healthier life.  · Look, smell, breathe, and feel better over time.  · Save money.  · Protect others from the  harms of secondhand smoke.  What actions can I take to prevent health problems?  Quit smoking    · Do not start smoking. Quit if you already do.  · Make a plan to quit smoking and commit to it. Look for programs to help you and ask your health care provider for recommendations and ideas.  · Set a date and write down all the reasons you want to quit.  · Let your friends and family know you are quitting so they can help and support you. Consider finding friends who also want to quit. It can be easier to quit with someone else, so that you can support each other.  · Talk with your health care provider about using nicotine replacement medicines to help you quit, such as gum, lozenges, patches, sprays, or pills.  · Do not replace cigarette smoking with electronic cigarettes, which are commonly called e-cigarettes. The safety of e-cigarettes is not known, and some may contain harmful chemicals.  · If you try to quit but return to smoking, stay positive. It is common to slip up when you first quit, so take it one day at a time.  · Be prepared for cravings. When you feel the urge to smoke, chew gum or suck on hard candy.  Lifestyle  · Stay busy and take care of your body.  · Drink enough fluid to keep your urine pale yellow.  · Get plenty of exercise and eat a healthy diet. This can help prevent weight gain after quitting.  · Monitor your eating habits. Quitting smoking can cause you to have a larger appetite than when you smoke.  · Find ways to relax. Go out with friends or family to a movie or a restaurant where people do not smoke.  · Ask your health care provider about having regular tests (screenings) to check for cancer. This may include blood tests, imaging tests, and other tests.  · Find ways to manage your stress, such as meditation, yoga, or exercise.  Where to find support  To get support to quit smoking, consider:  · Asking your health care provider for more information and resources.  · Taking classes to learn  more about quitting smoking.  · Looking for local organizations that offer resources about quitting smoking.  · Joining a support group for people who want to quit smoking in your local community.  · Calling the smokefree.gov counselor helpline: 1-800-Quit-Now (1-855.442.9718)  Where to find more information  You may find more information about quitting smoking from:  · HelpGuide.org: www.helpguide.org  · Smokefree.gov: smokefree.gov  · American Lung Association: www.lung.org  Contact a health care provider if you:  · Have problems breathing.  · Notice that your lips, nose, or fingers turn blue.  · Have chest pain.  · Are coughing up blood.  · Feel faint or you pass out.  · Have other health changes that cause you to worry.  Summary  · Smoking tobacco can negatively affect your health, the health of those around you, your finances, and your social life.  · Do not start smoking. Quit if you already do. If you need help quitting, ask your health care provider.  · Think about joining a support group for people who want to quit smoking in your local community. There are many effective programs that will help you to quit this behavior.  This information is not intended to replace advice given to you by your health care provider. Make sure you discuss any questions you have with your health care provider.  Document Released: 01/02/2018 Document Revised: 09/11/2020 Document Reviewed: 01/02/2018  Elsevier Patient Education © 2020 [employer]  BMI for Adults  What is BMI?  Body mass index (BMI) is a number that is calculated from a person's weight and height. BMI can help estimate how much of a person's weight is composed of fat. BMI does not measure body fat directly. Rather, it is an alternative to procedures that directly measure body fat, which can be difficult and expensive.  BMI can help identify people who may be at higher risk for certain medical problems.  What are BMI measurements used for?  BMI is used as a  "screening tool to identify possible weight problems. It helps determine whether a person is obese, overweight, a healthy weight, or underweight.  BMI is useful for:  · Identifying a weight problem that may be related to a medical condition or may increase the risk for medical problems.  · Promoting changes, such as changes in diet and exercise, to help reach a healthy weight. BMI screening can be repeated to see if these changes are working.  How is BMI calculated?  BMI involves measuring your weight in relation to your height. Both height and weight are measured, and the BMI is calculated from those numbers. This can be done either in English ([place] or metric measurements. Note that charts and online BMI calculators are available to help you find your BMI quickly and easily without having to do these calculations yourself.  To calculate your BMI in English ([place] measurements:    1. Measure your weight in pounds (lb).  2. Multiply the number of pounds by 703.  ? For example, for a person who weighs 180 lb, multiply that number by 703, which equals 126,540.  3. Measure your height in inches. Then multiply that number by itself to get a measurement called \"inches squared.\"  ? For example, for a person who is 70 inches tall, the \"inches squared\" measurement is 70 inches x 70 inches, which equals 4,900 inches squared.  4. Divide the total from step 2 (number of lb x 703) by the total from step 3 (inches squared): 126,540 ÷ 4,900 = 25.8. This is your BMI.  To calculate your BMI in metric measurements:  1. Measure your weight in kilograms (kg).  2. Measure your height in meters (m). Then multiply that number by itself to get a measurement called \"meters squared.\"  ? For example, for a person who is 1.75 m tall, the \"meters squared\" measurement is 1.75 m x 1.75 m, which is equal to 3.1 meters squared.  3. Divide the number of kilograms (your weight) by the meters squared number. In this example: 70 ÷ 3.1 = 22.6. This is " your BMI.  What do the results mean?  BMI charts are used to identify whether you are underweight, normal weight, overweight, or obese. The following guidelines will be used:  · Underweight: BMI less than 18.5.  · Normal weight: BMI between 18.5 and 24.9.  · Overweight: BMI between 25 and 29.9.  · Obese: BMI of 30 or above.  Keep these notes in mind:  · Weight includes both fat and muscle, so someone with a muscular build, such as an athlete, may have a BMI that is higher than 24.9. In cases like these, BMI is not an accurate measure of body fat.  · To determine if excess body fat is the cause of a BMI of 25 or higher, further assessments may need to be done by a health care provider.  · BMI is usually interpreted in the same way for men and women.  Where to find more information  For more information about BMI, including tools to quickly calculate your BMI, go to these websites:  · Centers for Disease Control and Prevention: www.cdc.gov  · American Heart Association: www.heart.org  · National Heart, Lung, and Blood Glide: www.nhlbi.nih.gov  Summary  · Body mass index (BMI) is a number that is calculated from a person's weight and height.  · BMI may help estimate how much of a person's weight is composed of fat. BMI can help identify those who may be at higher risk for certain medical problems.  · BMI can be measured using English measurements or metric measurements.  · BMI charts are used to identify whether you are underweight, normal weight, overweight, or obese.  This information is not intended to replace advice given to you by your health care provider. Make sure you discuss any questions you have with your health care provider.  Document Released: 08/29/2005 Document Revised: 09/09/2020 Document Reviewed: 07/17/2020  Elsevier Patient Education © 2020 [employer].    Advance Care Planning and Advance Directives     You make decisions on a daily basis - decisions about where you want to live, your career,  your home, your life. Perhaps one of the most important decisions you face is your choice for future medical care. Take time to talk with your family and your healthcare team and start planning today.  Advance Care Planning is a process that can help you:  · Understand possible future healthcare decisions in light of your own experiences  · Reflect on those decision in light of your goals and values  · Discuss your decisions with those closest to you and the healthcare professionals that care for you  · Make a plan by creating a document that reflects your wishes    Surrogate Decision Maker  In the event of a medical emergency, which has left you unable to communicate or to make your own decisions, you would need someone to make decisions for you.  It is important to discuss your preferences for medical treatment with this person while you are in good health.     Qualities of a surrogate decision maker:  • Willing to take on this role and responsibility  • Knows what you want for future medical care  • Willing to follow your wishes even if they don't agree with them  • Able to make difficult medical decisions under stressful circumstances    Advance Directives  These are legal documents you can create that will guide your healthcare team and decision maker(s) when needed. These documents can be stored in the electronic medical record.    · Living Will - a legal document to guide your care if you have a terminal condition or a serious illness and are unable to communicate. States vary by statute in document names/types, but most forms may include one or more of the following:        -  Directions regarding life-prolonging treatments        -  Directions regarding artificially provided nutrition/hydration        -  Choosing a healthcare decision maker        -  Direction regarding organ/tissue donation    · Durable Power of  for Healthcare - this document names an -in-fact to make medical decisions for  you, but it may also allow this person to make personal and financial decisions for you. Please seek the advice of an  if you need this type of document.    **Advance Directives are not required and no one may discriminate against you if you do not sign one.    Medical Orders  Many states allow specific forms/orders signed by your physician to record your wishes for medical treatment in your current state of health. This form, signed in personal communication with your physician, addresses resuscitation and other medical interventions that you may or may not want.      For more information or to schedule a time with a [facility] Advance Care Planning Facilitator contact: [facility].[employer]/[facility] or call [phone] and someone will contact you directly.

## 2020-10-30 ENCOUNTER — TELEPHONE (OUTPATIENT)
Dept: CARDIOLOGY | Facility: CLINIC | Age: 65
End: 2020-10-30

## 2020-10-30 DIAGNOSIS — R79.89 ELEVATED BRAIN NATRIURETIC PEPTIDE (BNP) LEVEL: Primary | ICD-10-CM

## 2020-10-30 NOTE — TELEPHONE ENCOUNTER
Informed pt of results and the message from TW, he verbalized understanding. States he wanted lab orders sent to [facility].     Called [facility] at  [phone], was given the following fax # [phone].       Faxed lab orders to the above, as requested.

## 2020-10-30 NOTE — TELEPHONE ENCOUNTER
----- Message from EARLINE Armenta sent at 10/30/2020  6:35 AM EDT -----  Fluid marker is elevated. He needs to hold his Imdur for three days and take lasix 20 mg PO BID for three days.  Continue his KCl.  His KCL is a little high,but will come down with lasix.  Have him take mag 1 tablet by mouth BID for the three days he takes lasix.  He currently takes it once a day.  Have him go back to that after the three days of lasix. Have him resume his imdur after the three days of lasix as well.  Will need repeat BMP/MG next week, see where he wants to get it done and forward orders.  Thanks!

## 2020-12-07 ENCOUNTER — HOSPITAL ENCOUNTER (OUTPATIENT)
Dept: CARDIOLOGY | Facility: HOSPITAL | Age: 65
Discharge: HOME | End: 2020-12-07

## 2020-12-07 DIAGNOSIS — R06.02 SHORTNESS OF BREATH: ICD-10-CM

## 2020-12-07 DIAGNOSIS — R00.2 PALPITATIONS: ICD-10-CM

## 2020-12-07 DIAGNOSIS — I25.709 CORONARY ARTERY DISEASE INVOLVING CORONARY BYPASS GRAFT OF NATIVE HEART WITH ANGINA PECTORIS (HCC): ICD-10-CM

## 2020-12-07 DIAGNOSIS — R42 DIZZINESS: ICD-10-CM

## 2020-12-07 DIAGNOSIS — I65.23 BILATERAL CAROTID ARTERY STENOSIS: ICD-10-CM

## 2020-12-07 PROCEDURE — 93306 TTE W/DOPPLER COMPLETE: CPT | Performed by: INTERNAL MEDICINE

## 2020-12-07 PROCEDURE — 93306 TTE W/DOPPLER COMPLETE: CPT

## 2020-12-07 PROCEDURE — 93880 EXTRACRANIAL BILAT STUDY: CPT | Performed by: INTERNAL MEDICINE

## 2020-12-07 PROCEDURE — 93880 EXTRACRANIAL BILAT STUDY: CPT

## 2020-12-09 ENCOUNTER — TELEPHONE (OUTPATIENT)
Dept: CARDIOLOGY | Facility: CLINIC | Age: 65
End: 2020-12-09

## 2020-12-09 DIAGNOSIS — I25.709 CORONARY ARTERY DISEASE INVOLVING CORONARY BYPASS GRAFT OF NATIVE HEART WITH ANGINA PECTORIS (HCC): Primary | ICD-10-CM

## 2020-12-09 DIAGNOSIS — I10 ESSENTIAL HYPERTENSION: ICD-10-CM

## 2020-12-09 DIAGNOSIS — I65.23 BILATERAL CAROTID ARTERY STENOSIS: ICD-10-CM

## 2020-12-09 LAB
BH CV ECHO MEAS - ACS: 2.4 CM
BH CV ECHO MEAS - AO MAX PG: 5.6 MMHG
BH CV ECHO MEAS - AO MEAN PG: 3 MMHG
BH CV ECHO MEAS - AO ROOT AREA (BSA CORRECTED): 2.1
BH CV ECHO MEAS - AO ROOT AREA: 14.9 CM^2
BH CV ECHO MEAS - AO ROOT DIAM: 4.4 CM
BH CV ECHO MEAS - AO V2 MAX: 118 CM/SEC
BH CV ECHO MEAS - AO V2 MEAN: 82.6 CM/SEC
BH CV ECHO MEAS - AO V2 VTI: 25.5 CM
BH CV ECHO MEAS - BSA(HAYCOCK): 2.1 M^2
BH CV ECHO MEAS - BSA(HAYCOCK): 2.2 M^2
BH CV ECHO MEAS - BSA: 2.1 M^2
BH CV ECHO MEAS - BSA: 2.1 M^2
BH CV ECHO MEAS - BZI_BMI: 29.1 KILOGRAMS/M^2
BH CV ECHO MEAS - BZI_BMI: 29.5 KILOGRAMS/M^2
BH CV ECHO MEAS - BZI_METRIC_HEIGHT: 175.3 CM
BH CV ECHO MEAS - BZI_METRIC_HEIGHT: 180.3 CM
BH CV ECHO MEAS - BZI_METRIC_WEIGHT: 90.7 KG
BH CV ECHO MEAS - BZI_METRIC_WEIGHT: 94.8 KG
BH CV ECHO MEAS - EDV(CUBED): 110.6 ML
BH CV ECHO MEAS - EDV(MOD-SP4): 82.7 ML
BH CV ECHO MEAS - EDV(TEICH): 107.5 ML
BH CV ECHO MEAS - EF(CUBED): 65.1 %
BH CV ECHO MEAS - EF(MOD-SP4): 54.4 %
BH CV ECHO MEAS - EF(TEICH): 56.5 %
BH CV ECHO MEAS - ESV(CUBED): 38.6 ML
BH CV ECHO MEAS - ESV(MOD-SP4): 37.7 ML
BH CV ECHO MEAS - ESV(TEICH): 46.8 ML
BH CV ECHO MEAS - FS: 29.6 %
BH CV ECHO MEAS - IVS/LVPW: 0.85
BH CV ECHO MEAS - IVSD: 1.3 CM
BH CV ECHO MEAS - LA DIMENSION: 4.2 CM
BH CV ECHO MEAS - LA/AO: 0.97
BH CV ECHO MEAS - LV DIASTOLIC VOL/BSA (35-75): 40 ML/M^2
BH CV ECHO MEAS - LV IVRT: 0.12 SEC
BH CV ECHO MEAS - LV MASS(C)D: 265.2 GRAMS
BH CV ECHO MEAS - LV MASS(C)DI: 128.4 GRAMS/M^2
BH CV ECHO MEAS - LV SYSTOLIC VOL/BSA (12-30): 18.3 ML/M^2
BH CV ECHO MEAS - LVIDD: 4.8 CM
BH CV ECHO MEAS - LVIDS: 3.4 CM
BH CV ECHO MEAS - LVLD AP4: 7.3 CM
BH CV ECHO MEAS - LVLS AP4: 6.5 CM
BH CV ECHO MEAS - LVOT AREA (M): 3.8 CM^2
BH CV ECHO MEAS - LVOT AREA: 3.8 CM^2
BH CV ECHO MEAS - LVOT DIAM: 2.2 CM
BH CV ECHO MEAS - LVPWD: 1.5 CM
BH CV ECHO MEAS - MV A MAX VEL: 55.3 CM/SEC
BH CV ECHO MEAS - MV DEC SLOPE: 126 CM/SEC^2
BH CV ECHO MEAS - MV E MAX VEL: 51.4 CM/SEC
BH CV ECHO MEAS - MV E/A: 0.93
BH CV ECHO MEAS - RAP SYSTOLE: 10 MMHG
BH CV ECHO MEAS - RVDD: 3.7 CM
BH CV ECHO MEAS - RVSP: 26.3 MMHG
BH CV ECHO MEAS - SI(AO): 183.5 ML/M^2
BH CV ECHO MEAS - SI(CUBED): 34.8 ML/M^2
BH CV ECHO MEAS - SI(MOD-SP4): 21.8 ML/M^2
BH CV ECHO MEAS - SI(TEICH): 29.4 ML/M^2
BH CV ECHO MEAS - SV(AO): 379 ML
BH CV ECHO MEAS - SV(CUBED): 72 ML
BH CV ECHO MEAS - SV(MOD-SP4): 45 ML
BH CV ECHO MEAS - SV(TEICH): 60.8 ML
BH CV ECHO MEAS - TR MAX VEL: 202 CM/SEC
BH CV XLRA MEAS LEFT BULB EDV: -9.8 CM/SEC
BH CV XLRA MEAS LEFT BULB PSV: -35.8 CM/SEC
BH CV XLRA MEAS LEFT CCA RATIO VEL: -70.4 CM/SEC
BH CV XLRA MEAS LEFT DIST CCA EDV: -17.6 CM/SEC
BH CV XLRA MEAS LEFT DIST CCA PSV: -71 CM/SEC
BH CV XLRA MEAS LEFT DIST ICA EDV: -26.2 CM/SEC
BH CV XLRA MEAS LEFT DIST ICA PSV: -53.3 CM/SEC
BH CV XLRA MEAS LEFT ICA RATIO VEL: -132 CM/SEC
BH CV XLRA MEAS LEFT ICA/CCA RATIO: 1.9
BH CV XLRA MEAS LEFT MID ICA EDV: -24.9 CM/SEC
BH CV XLRA MEAS LEFT MID ICA PSV: -59.8 CM/SEC
BH CV XLRA MEAS LEFT PROX CCA EDV: 21.4 CM/SEC
BH CV XLRA MEAS LEFT PROX CCA PSV: 107.5 CM/SEC
BH CV XLRA MEAS LEFT PROX ECA EDV: -12.7 CM/SEC
BH CV XLRA MEAS LEFT PROX ECA PSV: -89.3 CM/SEC
BH CV XLRA MEAS LEFT PROX ICA EDV: -33 CM/SEC
BH CV XLRA MEAS LEFT PROX ICA PSV: -132.8 CM/SEC
BH CV XLRA MEAS LEFT VERTEBRAL A EDV: 13.1 CM/SEC
BH CV XLRA MEAS LEFT VERTEBRAL A PSV: 40.2 CM/SEC
BH CV XLRA MEAS RIGHT BULB EDV: -17 CM/SEC
BH CV XLRA MEAS RIGHT BULB PSV: -43.2 CM/SEC
BH CV XLRA MEAS RIGHT CCA RATIO VEL: 59 CM/SEC
BH CV XLRA MEAS RIGHT DIST CCA EDV: 22.6 CM/SEC
BH CV XLRA MEAS RIGHT DIST CCA PSV: 59.6 CM/SEC
BH CV XLRA MEAS RIGHT DIST ICA EDV: -28.5 CM/SEC
BH CV XLRA MEAS RIGHT DIST ICA PSV: -62.4 CM/SEC
BH CV XLRA MEAS RIGHT ICA RATIO VEL: -75.6 CM/SEC
BH CV XLRA MEAS RIGHT ICA/CCA RATIO: -1.3
BH CV XLRA MEAS RIGHT MID ICA EDV: -30.9 CM/SEC
BH CV XLRA MEAS RIGHT MID ICA PSV: -76.1 CM/SEC
BH CV XLRA MEAS RIGHT PROX CCA EDV: 12.7 CM/SEC
BH CV XLRA MEAS RIGHT PROX CCA PSV: 63.4 CM/SEC
BH CV XLRA MEAS RIGHT PROX ECA EDV: -12.3 CM/SEC
BH CV XLRA MEAS RIGHT PROX ECA PSV: -88.4 CM/SEC
BH CV XLRA MEAS RIGHT PROX ICA EDV: -14.3 CM/SEC
BH CV XLRA MEAS RIGHT PROX ICA PSV: -49.6 CM/SEC
BH CV XLRA MEAS RIGHT VERTEBRAL A EDV: 16.7 CM/SEC
BH CV XLRA MEAS RIGHT VERTEBRAL A PSV: 42.7 CM/SEC
MAXIMAL PREDICTED HEART RATE: 153 BPM
STRESS TARGET HR: 130 BPM

## 2020-12-09 NOTE — TELEPHONE ENCOUNTER
Aware of results and testing ordered. MARI KAYE    ----- Message from EARLINE Armenta sent at 12/9/2020  3:30 PM EST -----    Needs to have CTA OF NECk.  Will need BMP PRIOR thanks    US:   1.  Both common carotid arteries are widely patent.     2.  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  However, Doppler sampling suggests a lesser degree of stenosis.     3.  16 to 49% stenosis in both internal carotid arteries.  Atheroma somewhat more prominent on the left.     4.  Antegrade flow in both vertebral arteries.     Summary:  potentially hemodynamically significant stenosis in the left bifurcation extending into the proximal portion of the left internal carotid artery.  Otherwise nonobstructive carotid disease is described above.  Antegrade flow in both vertebral arteries.    ECHO:   1.  LV size and global LV systolic function are grossly preserved with a visually estimated ejection fraction of 50%.  Mild concentric left ventricular hypertrophy with no large dense focal wall motion abnormalities.  Grade 1 diastolic dysfunction with moderate left atrial enlargement.  Right heart chambers are grossly normal.     2.  Valves are morphologically and physiologically normal with no stenotic lesions, valve associated masses or thrombi, or hemodynamically significant regurgitation.  There is trivial MR and physiologic TR.     3.  The proximal aortic root is mildly to moderately dilated at 4.4 cm with no dissection or aneurysm.  There is no pericardial effusion present.     4.  Pulmonary artery pressures cannot be calculated.

## 2020-12-21 DIAGNOSIS — I25.810 CORONARY ARTERY DISEASE INVOLVING CORONARY BYPASS GRAFT OF NATIVE HEART WITHOUT ANGINA PECTORIS: ICD-10-CM

## 2020-12-21 DIAGNOSIS — I95.1 ORTHOSTATIC HYPOTENSION: Chronic | ICD-10-CM

## 2020-12-21 DIAGNOSIS — E78.5 HYPERLIPIDEMIA, UNSPECIFIED HYPERLIPIDEMIA TYPE: ICD-10-CM

## 2020-12-21 RX ORDER — FLUDROCORTISONE ACETATE 0.1 MG/1
TABLET ORAL
Qty: 20 TABLET | Refills: 11 | Status: SHIPPED | OUTPATIENT
Start: 2020-12-21 | End: 2021-03-23

## 2020-12-21 RX ORDER — CLOPIDOGREL BISULFATE 75 MG/1
TABLET ORAL
Qty: 30 TABLET | Refills: 11 | Status: SHIPPED | OUTPATIENT
Start: 2020-12-21 | End: 2021-02-04 | Stop reason: SDUPTHER

## 2020-12-21 RX ORDER — ATORVASTATIN CALCIUM 80 MG/1
TABLET, FILM COATED ORAL
Qty: 30 TABLET | Refills: 11 | Status: SHIPPED | OUTPATIENT
Start: 2020-12-21 | End: 2021-01-19

## 2021-01-19 DIAGNOSIS — E78.5 HYPERLIPIDEMIA, UNSPECIFIED HYPERLIPIDEMIA TYPE: ICD-10-CM

## 2021-01-19 RX ORDER — ATORVASTATIN CALCIUM 80 MG/1
TABLET, FILM COATED ORAL
Qty: 30 TABLET | Refills: 0 | Status: SHIPPED | OUTPATIENT
Start: 2021-01-19 | End: 2021-02-04 | Stop reason: SDUPTHER

## 2021-02-04 ENCOUNTER — LAB (OUTPATIENT)
Dept: CARDIOLOGY | Facility: CLINIC | Age: 66
End: 2021-02-04

## 2021-02-04 ENCOUNTER — OFFICE VISIT (OUTPATIENT)
Dept: CARDIOLOGY | Facility: CLINIC | Age: 66
End: 2021-02-04

## 2021-02-04 VITALS
SYSTOLIC BLOOD PRESSURE: 128 MMHG | HEIGHT: 69 IN | HEART RATE: 54 BPM | WEIGHT: 200 LBS | TEMPERATURE: 98 F | OXYGEN SATURATION: 96 % | BODY MASS INDEX: 29.62 KG/M2 | DIASTOLIC BLOOD PRESSURE: 74 MMHG

## 2021-02-04 DIAGNOSIS — I10 ESSENTIAL HYPERTENSION: Chronic | ICD-10-CM

## 2021-02-04 DIAGNOSIS — I65.23 BILATERAL CAROTID ARTERY STENOSIS: ICD-10-CM

## 2021-02-04 DIAGNOSIS — E83.42 HYPOMAGNESEMIA: ICD-10-CM

## 2021-02-04 DIAGNOSIS — R60.9 PERIPHERAL EDEMA: ICD-10-CM

## 2021-02-04 DIAGNOSIS — E87.6 HYPOKALEMIA: ICD-10-CM

## 2021-02-04 DIAGNOSIS — R93.89 ABNORMAL ULTRASOUND OF CAROTID ARTERY: ICD-10-CM

## 2021-02-04 DIAGNOSIS — E78.5 DYSLIPIDEMIA: Chronic | ICD-10-CM

## 2021-02-04 DIAGNOSIS — I95.1 ORTHOSTATIC HYPOTENSION: Chronic | ICD-10-CM

## 2021-02-04 DIAGNOSIS — Z00.00 HEALTHCARE MAINTENANCE: ICD-10-CM

## 2021-02-04 DIAGNOSIS — I71.20 THORACIC AORTIC ANEURYSM WITHOUT RUPTURE (HCC): ICD-10-CM

## 2021-02-04 DIAGNOSIS — E78.5 HYPERLIPIDEMIA, UNSPECIFIED HYPERLIPIDEMIA TYPE: ICD-10-CM

## 2021-02-04 DIAGNOSIS — R42 DIZZINESS: ICD-10-CM

## 2021-02-04 DIAGNOSIS — F17.200 CURRENT EVERY DAY SMOKER: Chronic | ICD-10-CM

## 2021-02-04 DIAGNOSIS — I25.810 CORONARY ARTERY DISEASE INVOLVING CORONARY BYPASS GRAFT OF NATIVE HEART WITHOUT ANGINA PECTORIS: ICD-10-CM

## 2021-02-04 DIAGNOSIS — R06.02 SHORTNESS OF BREATH: ICD-10-CM

## 2021-02-04 DIAGNOSIS — I25.5 ISCHEMIC CARDIOMYOPATHY: Chronic | ICD-10-CM

## 2021-02-04 DIAGNOSIS — J43.9 PULMONARY EMPHYSEMA, UNSPECIFIED EMPHYSEMA TYPE (HCC): ICD-10-CM

## 2021-02-04 DIAGNOSIS — I10 ESSENTIAL HYPERTENSION: ICD-10-CM

## 2021-02-04 DIAGNOSIS — I25.709 CORONARY ARTERY DISEASE INVOLVING CORONARY BYPASS GRAFT OF NATIVE HEART WITH ANGINA PECTORIS (HCC): Primary | ICD-10-CM

## 2021-02-04 LAB
ANION GAP SERPL CALCULATED.3IONS-SCNC: 10.3 MMOL/L (ref 5–15)
BASOPHILS # BLD AUTO: 0.05 10*3/MM3 (ref 0–0.2)
BASOPHILS NFR BLD AUTO: 0.7 % (ref 0–1.5)
BUN SERPL-MCNC: 12 MG/DL (ref 8–23)
BUN/CREAT SERPL: 8.1 (ref 7–25)
CALCIUM SPEC-SCNC: 8.7 MG/DL (ref 8.6–10.5)
CHLORIDE SERPL-SCNC: 101 MMOL/L (ref 98–107)
CO2 SERPL-SCNC: 21.7 MMOL/L (ref 22–29)
CREAT SERPL-MCNC: 1.48 MG/DL (ref 0.76–1.27)
DEPRECATED RDW RBC AUTO: 44.8 FL (ref 37–54)
EOSINOPHIL # BLD AUTO: 0.13 10*3/MM3 (ref 0–0.4)
EOSINOPHIL NFR BLD AUTO: 1.9 % (ref 0.3–6.2)
ERYTHROCYTE [DISTWIDTH] IN BLOOD BY AUTOMATED COUNT: 12.7 % (ref 12.3–15.4)
GFR SERPL CREATININE-BSD FRML MDRD: 47 ML/MIN/1.73
GLUCOSE SERPL-MCNC: 89 MG/DL (ref 65–99)
HCT VFR BLD AUTO: 40.3 % (ref 37.5–51)
HGB BLD-MCNC: 12.5 G/DL (ref 13–17.7)
IMM GRANULOCYTES # BLD AUTO: 0.02 10*3/MM3 (ref 0–0.05)
IMM GRANULOCYTES NFR BLD AUTO: 0.3 % (ref 0–0.5)
LYMPHOCYTES # BLD AUTO: 2.33 10*3/MM3 (ref 0.7–3.1)
LYMPHOCYTES NFR BLD AUTO: 33.3 % (ref 19.6–45.3)
MAGNESIUM SERPL-MCNC: 1.7 MG/DL (ref 1.6–2.4)
MCH RBC QN AUTO: 29.6 PG (ref 26.6–33)
MCHC RBC AUTO-ENTMCNC: 31 G/DL (ref 31.5–35.7)
MCV RBC AUTO: 95.5 FL (ref 79–97)
MONOCYTES # BLD AUTO: 0.58 10*3/MM3 (ref 0.1–0.9)
MONOCYTES NFR BLD AUTO: 8.3 % (ref 5–12)
NEUTROPHILS NFR BLD AUTO: 3.89 10*3/MM3 (ref 1.7–7)
NEUTROPHILS NFR BLD AUTO: 55.5 % (ref 42.7–76)
NRBC BLD AUTO-RTO: 0 /100 WBC (ref 0–0.2)
NT-PROBNP SERPL-MCNC: 4127 PG/ML (ref 0–900)
PLATELET # BLD AUTO: 199 10*3/MM3 (ref 140–450)
PMV BLD AUTO: 11.7 FL (ref 6–12)
POTASSIUM SERPL-SCNC: 4.4 MMOL/L (ref 3.5–5.2)
RBC # BLD AUTO: 4.22 10*6/MM3 (ref 4.14–5.8)
SODIUM SERPL-SCNC: 133 MMOL/L (ref 136–145)
WBC # BLD AUTO: 7 10*3/MM3 (ref 3.4–10.8)

## 2021-02-04 PROCEDURE — 99214 OFFICE O/P EST MOD 30 MIN: CPT | Performed by: NURSE PRACTITIONER

## 2021-02-04 PROCEDURE — 93000 ELECTROCARDIOGRAM COMPLETE: CPT | Performed by: NURSE PRACTITIONER

## 2021-02-04 PROCEDURE — 85025 COMPLETE CBC W/AUTO DIFF WBC: CPT | Performed by: NURSE PRACTITIONER

## 2021-02-04 PROCEDURE — 83735 ASSAY OF MAGNESIUM: CPT | Performed by: NURSE PRACTITIONER

## 2021-02-04 PROCEDURE — 83880 ASSAY OF NATRIURETIC PEPTIDE: CPT | Performed by: NURSE PRACTITIONER

## 2021-02-04 PROCEDURE — 36415 COLL VENOUS BLD VENIPUNCTURE: CPT

## 2021-02-04 PROCEDURE — 80048 BASIC METABOLIC PNL TOTAL CA: CPT | Performed by: NURSE PRACTITIONER

## 2021-02-04 RX ORDER — ALBUTEROL SULFATE 90 UG/1
2 AEROSOL, METERED RESPIRATORY (INHALATION) EVERY 4 HOURS PRN
Qty: 8 G | Refills: 11 | Status: SHIPPED | OUTPATIENT
Start: 2021-02-04 | End: 2021-11-09 | Stop reason: DRUGHIGH

## 2021-02-04 RX ORDER — ISOSORBIDE MONONITRATE 30 MG/1
30 TABLET, EXTENDED RELEASE ORAL DAILY
Qty: 90 TABLET | Refills: 3 | Status: SHIPPED | OUTPATIENT
Start: 2021-02-04 | End: 2021-02-10 | Stop reason: SDUPTHER

## 2021-02-04 RX ORDER — CLOPIDOGREL BISULFATE 75 MG/1
75 TABLET ORAL DAILY
Qty: 90 TABLET | Refills: 3 | Status: SHIPPED | OUTPATIENT
Start: 2021-02-04 | End: 2021-11-09 | Stop reason: SDUPTHER

## 2021-02-04 RX ORDER — POTASSIUM CHLORIDE 20 MEQ/1
20 TABLET, EXTENDED RELEASE ORAL DAILY
Qty: 90 TABLET | Refills: 3 | Status: SHIPPED | OUTPATIENT
Start: 2021-02-04 | End: 2021-11-09 | Stop reason: SDUPTHER

## 2021-02-04 RX ORDER — ATORVASTATIN CALCIUM 80 MG/1
80 TABLET, FILM COATED ORAL NIGHTLY
Qty: 90 TABLET | Refills: 3 | Status: SHIPPED | OUTPATIENT
Start: 2021-02-04 | End: 2021-11-09 | Stop reason: SDUPTHER

## 2021-02-04 RX ORDER — MULTIVITAMIN WITH IRON
250 TABLET ORAL DAILY
Qty: 90 TABLET | Refills: 3 | Status: SHIPPED | OUTPATIENT
Start: 2021-02-04 | End: 2021-11-09 | Stop reason: SDUPTHER

## 2021-02-04 RX ORDER — NITROGLYCERIN 0.4 MG/1
0.4 TABLET SUBLINGUAL
Qty: 35 TABLET | Refills: 3 | Status: SHIPPED | OUTPATIENT
Start: 2021-02-04 | End: 2021-08-20

## 2021-02-04 NOTE — PROGRESS NOTES
Subjective   Darnell Abarca is a 65 y.o. male     Chief Complaint   Patient presents with   • Follow-up   • Coronary Artery Disease       HPI    Problem List:     1. CAD  1.1 CABG @ [facility] distant past  1.2 [facility] 6/2015 - patient grafts with disease in the native vessels with medical management recommendation; EF 40%  1.3 stress test 12/10/18-apical and inferoapical ischemia, depressed post stress EF 44%  1.4 left heart cath 7/9/19-left main 20%, LAD 60 to 70% stenosis, patent LIMA to distal LAD which also has diffuse distal vessel disease, first obtuse marginal branch 100% occluded with a patent saphenous vein graft to the posterior lateral branches of the left circumflex, right coronary artery 100% occluded the graft to this vessel is also occluded however there is collateral filling to the left circumflex system EF 40%, systolic hypertension  2. Ischemic Cardiomyopathy   2.1 echo 12/30/2019-EF 50%, mild to moderate LVH, diastolic dysfunction 1, mildly dilated proximal aortic root at 4.2 cm  2.2 echo 12/7/2020-EF 50%, mild LVH, diastolic function 1, trivial MR, physiological TR, dilated proximal aortic root 4.4 cm  3. CHF; class II-III symptoms   4. Dyslipidemia   5. Orthostatic hypotension  6. COPD  7. History of throat CA  8. Event Monitor 5/1-5/14/17 - SB - NSR with PVC  9. Carotid Artery Disease   9.1 Carotid Artery US 5/8/17 - AMALIA 16-49%; 30th 50%; = or < 50% left common carotid artery; antegrade flow both vertebral arteries   9.2 carotid artery ultrasound 12/30/2019-mild bifurcation disease on right less than 15% stenosis, moderate bifurcation disease on left 16 to 49% stenosis, 50% less stenosis on the right internal carotid artery and approximately 50% stenosis of the ostium and extending into the proximal portion of the left internal carotid artery, antegrade flow of both vertebral arteries  9.3 carotid artery ultrasound 12/7/2020-moderate bifurcation disease on the left with less than or equal to 50% stenosis  on the right and 50 to 75% stenosis by echo imaging extending into the proximal left internal carotid artery, 16 to 49% stenosis both internal carotid arteries more prominent on the left, antegrade flow both vertebral arteries; potentially hemodynamically significant stenosis on the left  10. Smoking Habituation   11.  Thoracic aortic aneurysm  11.1 CT of the chest 1/10/2020 -4 cm ascending aortic aneurysm    Patient is a 65-year-old male who presents today for a follow-up on testing with his grandson at his side.  He denies any chest pain, pressure, palpitations, fluttering, presyncope, syncope, orthopnea, PND or edema.  Patient says he does have shortness of breath when he is walking or exerts himself but he says this has not changed.  He does get burning and aching in his legs and numbness in his hands and feet.  Patient is going back to the pain doctors try to get back on gabapentin as he says it really did help.  Patient smokes 2 packs a day still.    Last time patient a CT of the chest it was without contrast so we will try to do without contrast again as he still needs a CTA of the carotids due to significant artery stenosis per carotid artery ultrasound.  He said he was not aware of his appointment in December to have it done and that is why it was not done.  Current Outpatient Medications on File Prior to Visit   Medication Sig Dispense Refill   • albuterol (PROVENTIL) (2.5 MG/3ML) 0.083% nebulizer solution Take 2.5 mg by nebulization Every 4 (Four) Hours As Needed for Wheezing. 3 mL 12   • Cyanocobalamin 1000 MCG/ML kit Inject 2 mL as directed Every 30 (Thirty) Days.     • fludrocortisone 0.1 MG tablet TAKE 1 TABLET BY MOUTH DAILY ON MONDAY, WEDNESDAY, FRIDAY, AND SUNDAY. 20 tablet 11   • NARCAN 4 MG/0.1ML nasal spray Take As Directed.     • omeprazole (PrilOSEC) 40 MG capsule Take 40 mg by mouth Daily.     • oxyCODONE-acetaminophen (PERCOCET)  MG per tablet Take 1 tablet by mouth Every 8 (Eight)  Hours As Needed.     • promethazine (PHENERGAN) 25 MG tablet Take 25 mg by mouth Every 6 (Six) Hours As Needed for Nausea or Vomiting.     • tamsulosin (FLOMAX) 0.4 MG capsule 24 hr capsule Take 1 capsule by mouth Every Night.     • [DISCONTINUED] albuterol sulfate  (90 Base) MCG/ACT inhaler Inhale 2 puffs Every 4 (Four) Hours As Needed for Wheezing or Shortness of Air. 8 g 11   • [DISCONTINUED] atorvastatin (LIPITOR) 80 MG tablet TAKE 1 TABLET BY MOUTH NIGHTLY. 30 tablet 0   • [DISCONTINUED] clopidogrel (PLAVIX) 75 MG tablet TAKE 1 TABLET DAILY. 30 tablet 11   • [DISCONTINUED] isosorbide mononitrate (IMDUR) 30 MG 24 hr tablet Take 30 mg by mouth Daily.     • [DISCONTINUED] Magnesium 250 MG tablet Take 250 mg by mouth Daily.     • [DISCONTINUED] nitroglycerin (NITROSTAT) 0.4 MG SL tablet Place 0.4 mg under the tongue Every 5 (Five) Minutes As Needed for Chest Pain.     • [DISCONTINUED] potassium chloride (K-DUR,KLOR-CON) 20 MEQ CR tablet Take 1 tablet by mouth 2 (Two) Times a Day. (Patient taking differently: Take 20 mEq by mouth Daily.) 60 tablet 11   • [DISCONTINUED] hydrOXYzine (ATARAX) 25 MG tablet Take 1 tablet by mouth Every 6 (Six) Hours As Needed for Anxiety. 45 tablet 5     No current facility-administered medications on file prior to visit.        ALLERGIES    Aspirin, Codeine, Ethanolamine, Nsaids, Other, Salicylates, Theophylline, and Triprolidine-pseudoephedrine    Past Medical History:   Diagnosis Date   • CAD (coronary artery disease)    • COPD (chronic obstructive pulmonary disease) (CMS/HCC)    • Current every day smoker     1.5-2 PPD   • Dizziness    • Dyslipidemia    • Edema    • History of throat cancer    • Hyperlipidemia    • Hypertension    • Ischemic cardiomyopathy    • Lightheaded    • Myocardial infarction (CMS/HCC)    • Orthostatic hypotension    • SOB (shortness of breath)    • Stroke (CMS/HCC)        Social History     Socioeconomic History   • Marital status:      Spouse  name: Not on file   • Number of children: Not on file   • Years of education: Not on file   • Highest education level: Not on file   Tobacco Use   • Smoking status: Current Every Day Smoker     Packs/day: 2.00     Years: 40.00     Pack years: 80.00     Types: Electronic Cigarette, Cigarettes     Last attempt to quit: 2019     Years since quittin.6   • Smokeless tobacco: Never Used   Substance and Sexual Activity   • Alcohol use: Yes     Comment: occasionally   • Drug use: No   • Sexual activity: Defer       Family History   Problem Relation Age of Onset   • Diabetes Mother    • Hypertension Mother    • Hypertension Father    • Hyperlipidemia Sister         Familial hypercholesterolemia   • Hypertension Sister    • Hyperlipidemia Brother         Familial hypercholesterolemia   • Heart attack Brother         Acute MI   • Stroke Brother         CVA   • Hypertension Brother    • Heart disease Brother         pacemaker placement   • Other Brother         Pacemaker placement       Review of Systems   Constitutional: Negative for appetite change, chills, fatigue and fever.   HENT: Negative for congestion, rhinorrhea and sore throat.    Eyes: Positive for visual disturbance (glasses).   Respiratory: Positive for shortness of breath (while walking; no change; if he exerts himself ) and wheezing (at night ). Negative for cough and chest tightness.    Cardiovascular: Negative for chest pain, palpitations and leg swelling.   Gastrointestinal: Positive for constipation (some times and he has meds ). Negative for abdominal pain, blood in stool, diarrhea, nausea and vomiting.   Endocrine: Negative for cold intolerance and heat intolerance.   Genitourinary: Negative for difficulty urinating, dysuria, frequency, hematuria and urgency.   Musculoskeletal: Positive for arthralgias, back pain (lower back ), gait problem (uses a cane ) and neck pain (right side ). Negative for joint swelling and neck stiffness.   Skin: Negative  "for rash and wound.   Allergic/Immunologic: Negative for environmental allergies and food allergies.   Neurological: Positive for dizziness (better than it was ), weakness (legs burn and ache ) and numbness (feet and tingling in his hands ). Negative for light-headedness and headaches.   Hematological: Bruises/bleeds easily (bleeds ).   Psychiatric/Behavioral: Positive for sleep disturbance (Hard to get to sleep he gets 3 hrs of a night ).       Objective   /74   Pulse 54   Temp 98 °F (36.7 °C)   Ht 175.3 cm (69\")   Wt 90.7 kg (200 lb)   SpO2 96%   BMI 29.53 kg/m²   Vitals:    02/04/21 1444   BP: 128/74   Pulse: 54   Temp: 98 °F (36.7 °C)   SpO2: 96%   Weight: 90.7 kg (200 lb)   Height: 175.3 cm (69\")      Lab Results (most recent)     None        Physical Exam  Vitals signs reviewed.   Constitutional:       General: He is awake.      Appearance: Normal appearance. He is well-developed, well-groomed and overweight.   HENT:      Head: Normocephalic.      Right Ear: Decreased hearing noted.      Left Ear: Decreased hearing noted.      Mouth/Throat:      Comments: Voice change since h/o of throat CA  Eyes:      General: Lids are normal.      Comments: Wears glasses   Neck:      Vascular: No carotid bruit, hepatojugular reflux or JVD.   Cardiovascular:      Rate and Rhythm: Regular rhythm. Bradycardia present.      Pulses:           Radial pulses are 2+ on the right side and 2+ on the left side.        Dorsalis pedis pulses are 2+ on the right side and 2+ on the left side.        Posterior tibial pulses are 2+ on the right side and 2+ on the left side.      Heart sounds: Normal heart sounds.   Pulmonary:      Effort: Pulmonary effort is normal.      Breath sounds: Normal air entry. Examination of the right-upper field reveals wheezing. Examination of the left-upper field reveals wheezing. Examination of the right-middle field reveals wheezing. Examination of the left-middle field reveals wheezing. " Examination of the right-lower field reveals wheezing. Examination of the left-lower field reveals wheezing. Wheezing (inspiratory and expiratory ) present.   Abdominal:      General: Bowel sounds are normal.      Palpations: Abdomen is soft.   Musculoskeletal:      Right lower leg: No edema.      Left lower leg: No edema.   Skin:     General: Skin is warm and dry.   Neurological:      Mental Status: He is alert and oriented to person, place, and time.   Psychiatric:         Attention and Perception: Attention and perception normal.         Mood and Affect: Mood and affect normal.         Speech: Speech normal.         Behavior: Behavior normal. Behavior is cooperative.         Thought Content: Thought content normal.         Cognition and Memory: Cognition and memory normal.         Judgment: Judgment normal.         Procedure     ECG 12 Lead    Date/Time: 2/4/2021 4:59 PM  Performed by: Tess Vergara APRN  Authorized by: Tess Vergara APRN   Comparison: compared with previous ECG from 12/16/2019  Comparison to previous ECG: New T wave inversions  Rhythm: sinus rhythm  Ectopy: infrequent PVCs  Rate: normal  BPM: 60  T inversion: II, III, aVF, V4 and V6  QRS axis: normal  Other findings: non-specific ST-T wave changes    Clinical impression: abnormal EKG                 Assessment/Plan      Diagnosis Plan   1. Coronary artery disease involving coronary bypass graft of native heart with angina pectoris (CMS/HCC)  isosorbide mononitrate (IMDUR) 30 MG 24 hr tablet    nitroglycerin (Nitrostat) 0.4 MG SL tablet   2. Current every day smoker     3. Dyslipidemia     4. Essential hypertension  Basic Metabolic Panel    CBC & Differential   5. Ischemic cardiomyopathy     6. Orthostatic hypotension     7. Peripheral edema     8. Shortness of breath  albuterol sulfate  (90 Base) MCG/ACT inhaler    BNP   9. Bilateral carotid artery stenosis  CT Angiogram Carotids   10. Abnormal ultrasound of carotid artery  CT  Angiogram Carotids   11. Dizziness  CT Angiogram Carotids   12. Hyperlipidemia, unspecified hyperlipidemia type  atorvastatin (LIPITOR) 80 MG tablet   13. Coronary artery disease involving coronary bypass graft of native heart without angina pectoris  clopidogrel (PLAVIX) 75 MG tablet   14. Hypokalemia  potassium chloride (K-DUR,KLOR-CON) 20 MEQ CR tablet   15. Pulmonary emphysema, unspecified emphysema type (CMS/HCC)  albuterol sulfate  (90 Base) MCG/ACT inhaler   16. Hypomagnesemia  Magnesium 250 MG tablet    Magnesium   17. Healthcare maintenance  Basic Metabolic Panel    Magnesium    CBC & Differential   18. Thoracic aortic aneurysm without rupture (CMS/HCC)  CT Chest Without Contrast       Return in about 3 months (around 5/4/2021).    CAD-patient is on statin and Plavix.  He is on isosorbide as well for previous chest pain but doing well.  Dyslipidemia-patient's on Lipitor and doing well.  Hypertension-patient's only on Imdur and doing well.  Ischemic cardiomyopathy-patient is not on beta or ACE/ARB due to hypotension.  Orthostatic hypotension/dizziness-patient is on Florinef and doing well.  Peripheral edema/diastolic dysfunction-patient has no signs of failure.  Shortness of breath-stable and refilled his inhaler.  Bilateral carotid artery stenosis, dizziness and abnormal carotid artery ultrasound-patient will have a CTA of carotids.  He is on statin and Plavix.  Hyperlipidemia-patient's on Lipitor and doing well.  Hypokalemia-patient is on potassium.  Hypomagnesia-refill patient's magnesium.  Thoracic aortic aneurysm-we will try to repeat CT of the chest without contrast as we were able to assess his aneurysm this way previously.  And he has to have the CTA of the carotids.  He will get a BNP, BMP, magnesium and CBC today.  He will continue his medication regimen for now.  He will follow-up in 3 months or sooner if any changes or abnormalities with testing.      Patient says it is best to call his  number when trying to reach him and that is the [phone] number.    Patient to have his CTs at [facility].    Darnell Abarca  reports that he has been smoking electronic cigarette and cigarettes. He has a 80.00 pack-year smoking history. He has never used smokeless tobacco.. I have educated him on the risk of diseases from using tobacco products such as cancer, COPD and heart disease.     I advised him to quit and he is not willing to quit.    I spent 3  minutes counseling the patient.     Advance Care Planning   ACP discussion was declined by the patient. Patient does not have an advance directive, declines further assistance.    Patient's Body mass index is 29.53 kg/m². BMI is above normal parameters. Recommendations include: educational material.      Electronically signed by:

## 2021-02-04 NOTE — PATIENT INSTRUCTIONS
Smokeless Tobacco Information, Adult    Tobacco is a leafy plant that contains a chemical (nicotine). Nicotine affects your brain and causes you to become addicted to it. Smokeless tobacco is tobacco that you put in your mouth instead of smoking it. It may also be called chewing tobacco or snuff.  Smokeless tobacco is made from the leaves of tobacco plants and comes in many forms, such as:  · Loose, dry leaves.  · Plugs or twists.  · Moist pouches.  · Dissolving lozenges or strips.  Chewing, sucking, or holding the tobacco in your mouth causes your mouth to make more saliva. The saliva mixes with the tobacco, which you swallow or spit out. Using tobacco is harmful to your health.  How can smokeless tobacco affect me?  All forms of tobacco contain many chemicals that can harm every organ in the body. Using smokeless tobacco increases your risk for:  · Cancer. Tobacco use is one of the leading causes of cancer. Smokeless tobacco is linked to cancer of the mouth, esophagus, and pancreas.  · Other long-term health problems, including high blood pressure, heart disease, and stroke.  · Addiction.  · Pregnancy complications, if this applies. Pregnant women who use smokeless tobacco are more likely to have a miscarriage or deliver a baby too early.  · Mouth and dental problems, such as:  ? Bad breath.  ? Teeth that look yellow or brown.  ? Mouth sores.  ? Cracking and bleeding lips.  ? Gum recession, gum disease, or tooth decay.  ? Lesions on the soft tissues of your mouth (leukoplakia).  The benefits of not using smokeless tobacco include:  · A healthy mind and body.  · Saving money. You avoid the cost of buying tobacco and the cost of treating illnesses that are caused by tobacco.  What actions can I take to quit using tobacco?  Ask your health care provider for help to quit using smokeless tobacco. This may involve treatment.  These tips may also help you quit:  · Pick a date to quit. Set a date within the next two  weeks. This gives you time to prepare.  · Write down the reasons why you are quitting. Keep this list in places where you will see it often, such as on your bathroom mirror or in your car or wallet.  · Identify the people, places, things, and activities that make you want to use smokeless tobacco (triggers). Avoid them.  · Get rid of any tobacco you have and remove any tobacco smells. To do this:  ? Throw away all containers of tobacco at home, at work, and in your car.  ? Throw away any other items that you use regularly when you chew tobacco.  ? Clean your car and make sure to remove all tobacco-related items.  ? Clean your home, including curtains and carpets.  · Tell your family and friends that you are quitting. Having support can make quitting easier.  · Chew sugarless gum or sunflower seeds when you want to use smokeless tobacco.  · Stay positive. Be prepared for cravings. It is common to slip up when you first quit, so take it one day at a time.  · Stay busy and take care of your body. Get plenty of exercise. Drink enough water to keep your urine pale yellow.  · Keep track of how many days have passed since you quit. Remembering how long and hard you have worked to quit can help you avoid using smokeless tobacco again.  Where to find support  Ask your health care provider if there is a local support group for quitting smokeless tobacco.  Where to find more information  You can learn more about the risks of using smokeless tobacco and the benefits of quitting from these sources:  · American Cancer Society: www.cancer.org  · National Cancer [place]: www.cancer.gov  · Centers for Disease Control and Prevention: www.cdc.gov  Contact a health care provider if you have:  · Trouble quitting smokeless tobacco use on your own.  · White or other discolored patches in your mouth.  · Difficulty swallowing.  · A change in your voice.  · Unexplained weight loss.  · Stomach pain, nausea, or  vomiting.  Summary  · Smokeless tobacco contains many different chemicals that are known to cause cancer.  · Nicotine is an addictive chemical in smokeless tobacco that affects your brain.  · The benefits of not using smokeless tobacco include having a healthy mind and body and saving money.  · Tell your family and friends that you are quitting. Having support can make quitting easier.  · Ask your health care provider for help quitting smokeless tobacco. This may involve treatment.  This information is not intended to replace advice given to you by your health care provider. Make sure you discuss any questions you have with your health care provider.  Document Revised: 09/11/2020 Document Reviewed: 02/24/2020  "[employer]" Patient Education © 2020 "[employer] Inc[employer]  BMI for Adults  What is BMI?  Body mass index (BMI) is a number that is calculated from a person's weight and height. BMI can help estimate how much of a person's weight is composed of fat. BMI does not measure body fat directly. Rather, it is an alternative to procedures that directly measure body fat, which can be difficult and expensive.  BMI can help identify people who may be at higher risk for certain medical problems.  What are BMI measurements used for?  BMI is used as a screening tool to identify possible weight problems. It helps determine whether a person is obese, overweight, a healthy weight, or underweight.  BMI is useful for:  · Identifying a weight problem that may be related to a medical condition or may increase the risk for medical problems.  · Promoting changes, such as changes in diet and exercise, to help reach a healthy weight. BMI screening can be repeated to see if these changes are working.  How is BMI calculated?  BMI involves measuring your weight in relation to your height. Both height and weight are measured, and the BMI is calculated from those numbers. This can be done either in English ([place] or metric measurements. Note that  "charts and online BMI calculators are available to help you find your BMI quickly and easily without having to do these calculations yourself.  To calculate your BMI in English ([place] measurements:    1. Measure your weight in pounds (lb).  2. Multiply the number of pounds by 703.  ? For example, for a person who weighs 180 lb, multiply that number by 703, which equals 126,540.  3. Measure your height in inches. Then multiply that number by itself to get a measurement called \"inches squared.\"  ? For example, for a person who is 70 inches tall, the \"inches squared\" measurement is 70 inches x 70 inches, which equals 4,900 inches squared.  4. Divide the total from step 2 (number of lb x 703) by the total from step 3 (inches squared): 126,540 ÷ 4,900 = 25.8. This is your BMI.  To calculate your BMI in metric measurements:  1. Measure your weight in kilograms (kg).  2. Measure your height in meters (m). Then multiply that number by itself to get a measurement called \"meters squared.\"  ? For example, for a person who is 1.75 m tall, the \"meters squared\" measurement is 1.75 m x 1.75 m, which is equal to 3.1 meters squared.  3. Divide the number of kilograms (your weight) by the meters squared number. In this example: 70 ÷ 3.1 = 22.6. This is your BMI.  What do the results mean?  BMI charts are used to identify whether you are underweight, normal weight, overweight, or obese. The following guidelines will be used:  · Underweight: BMI less than 18.5.  · Normal weight: BMI between 18.5 and 24.9.  · Overweight: BMI between 25 and 29.9.  · Obese: BMI of 30 or above.  Keep these notes in mind:  · Weight includes both fat and muscle, so someone with a muscular build, such as an athlete, may have a BMI that is higher than 24.9. In cases like these, BMI is not an accurate measure of body fat.  · To determine if excess body fat is the cause of a BMI of 25 or higher, further assessments may need to be done by a health care " provider.  · BMI is usually interpreted in the same way for men and women.  Where to find more information  For more information about BMI, including tools to quickly calculate your BMI, go to these websites:  · Centers for Disease Control and Prevention: www.cdc.gov  · American Heart Association: www.heart.org  · National Heart, Lung, and Blood [place]: www.nhlbi.nih.gov  Summary  · Body mass index (BMI) is a number that is calculated from a person's weight and height.  · BMI may help estimate how much of a person's weight is composed of fat. BMI can help identify those who may be at higher risk for certain medical problems.  · BMI can be measured using English measurements or metric measurements.  · BMI charts are used to identify whether you are underweight, normal weight, overweight, or obese.  This information is not intended to replace advice given to you by your health care provider. Make sure you discuss any questions you have with your health care provider.  Document Revised: 09/09/2020 Document Reviewed: 07/17/2020  [employer] Patient Education © 2020 [employer]    Advance Care Planning and Advance Directives     You make decisions on a daily basis - decisions about where you want to live, your career, your home, your life. Perhaps one of the most important decisions you face is your choice for future medical care. Take time to talk with your family and your healthcare team and start planning today.  Advance Care Planning is a process that can help you:  · Understand possible future healthcare decisions in light of your own experiences  · Reflect on those decision in light of your goals and values  · Discuss your decisions with those closest to you and the healthcare professionals that care for you  · Make a plan by creating a document that reflects your wishes    Surrogate Decision Maker  In the event of a medical emergency, which has left you unable to communicate or to make your own decisions, you  would need someone to make decisions for you.  It is important to discuss your preferences for medical treatment with this person while you are in good health.     Qualities of a surrogate decision maker:  • Willing to take on this role and responsibility  • Knows what you want for future medical care  • Willing to follow your wishes even if they don't agree with them  • Able to make difficult medical decisions under stressful circumstances    Advance Directives  These are legal documents you can create that will guide your healthcare team and decision maker(s) when needed. These documents can be stored in the electronic medical record.    · Living Will - a legal document to guide your care if you have a terminal condition or a serious illness and are unable to communicate. States vary by statute in document names/types, but most forms may include one or more of the following:        -  Directions regarding life-prolonging treatments        -  Directions regarding artificially provided nutrition/hydration        -  Choosing a healthcare decision maker        -  Direction regarding organ/tissue donation    · Durable Power of  for Healthcare - this document names an -in-fact to make medical decisions for you, but it may also allow this person to make personal and financial decisions for you. Please seek the advice of an  if you need this type of document.    **Advance Directives are not required and no one may discriminate against you if you do not sign one.    Medical Orders  Many states allow specific forms/orders signed by your physician to record your wishes for medical treatment in your current state of health. This form, signed in personal communication with your physician, addresses resuscitation and other medical interventions that you may or may not want.      For more information or to schedule a time with a [facility] Advance Care Planning Facilitator contact:  [facility] or call [phone] and someone will contact you directly.

## 2021-02-05 DIAGNOSIS — R06.02 SHORTNESS OF BREATH: ICD-10-CM

## 2021-02-05 DIAGNOSIS — R60.9 EDEMA, UNSPECIFIED TYPE: ICD-10-CM

## 2021-02-05 DIAGNOSIS — I10 ESSENTIAL HYPERTENSION: Chronic | ICD-10-CM

## 2021-02-05 DIAGNOSIS — R00.2 PALPITATIONS: ICD-10-CM

## 2021-02-05 DIAGNOSIS — I65.23 BILATERAL CAROTID ARTERY STENOSIS: ICD-10-CM

## 2021-02-05 DIAGNOSIS — J44.9 CHRONIC OBSTRUCTIVE PULMONARY DISEASE, UNSPECIFIED COPD TYPE (HCC): ICD-10-CM

## 2021-02-05 DIAGNOSIS — R42 DIZZINESS: ICD-10-CM

## 2021-02-05 DIAGNOSIS — I25.10 CORONARY ARTERY DISEASE INVOLVING NATIVE CORONARY ARTERY OF NATIVE HEART WITHOUT ANGINA PECTORIS: Primary | Chronic | ICD-10-CM

## 2021-02-05 RX ORDER — POTASSIUM CHLORIDE 750 MG/1
10 TABLET, FILM COATED, EXTENDED RELEASE ORAL DAILY
Qty: 30 TABLET | Refills: 11 | Status: SHIPPED | OUTPATIENT
Start: 2021-02-05 | End: 2021-02-10

## 2021-02-05 RX ORDER — FUROSEMIDE 20 MG/1
TABLET ORAL
Qty: 34 TABLET | Refills: 11 | Status: SHIPPED | OUTPATIENT
Start: 2021-02-05 | End: 2021-11-09 | Stop reason: SDUPTHER

## 2021-02-05 NOTE — TELEPHONE ENCOUNTER
Pt sister advised of lab results and advised her that he needs to take lasix 20 mg bid for 4 days and potassium with the lasix ( 10meg ) of Potassium. Pt is then to do 20 mg of lasix 1 tablet daily . He will need labs done in 1 week lab order will be faxed to [facility] . BNP,BMP,and Mag.  Johanna Figueredo /ANNETTA                        ----- Message from EARLINE Armenta sent at 2/5/2021  6:58 AM EST -----  Patient has sig fluid overload.  Needs to take lasix 20 mg PO BID for 4 days with potassium 10 meq PO daily.  He then can go to Lasix 20 mg PO daily.  Repeat a BNP/Mg/BMP in 1 week which he can do at [facility], just make sure orders are faxed there.

## 2021-02-10 ENCOUNTER — TELEPHONE (OUTPATIENT)
Dept: CARDIOLOGY | Facility: CLINIC | Age: 66
End: 2021-02-10

## 2021-02-10 DIAGNOSIS — I25.709 CORONARY ARTERY DISEASE INVOLVING CORONARY BYPASS GRAFT OF NATIVE HEART WITH ANGINA PECTORIS (HCC): ICD-10-CM

## 2021-02-10 RX ORDER — ISOSORBIDE MONONITRATE 30 MG/1
15 TABLET, EXTENDED RELEASE ORAL EVERY EVENING
Qty: 90 TABLET | Refills: 3 | Status: SHIPPED | OUTPATIENT
Start: 2021-02-10 | End: 2021-05-04

## 2021-02-10 NOTE — TELEPHONE ENCOUNTER
[facility]  Patient did not want to take his lasix due to BP.  I advised to have him hold his imdur for the 4 days.  Once he goes to lasix once a day he can then take 1/2 tab in evening instead of AM so it was opposite of the day and therefore not drop his BP as much.  They will do labs once he has the 4 days of BID lasix.

## 2021-03-10 ENCOUNTER — TELEPHONE (OUTPATIENT)
Dept: CARDIOLOGY | Facility: CLINIC | Age: 66
End: 2021-03-10

## 2021-03-10 NOTE — TELEPHONE ENCOUNTER
----- Message from EARLINE Armenta sent at 3/10/2021 12:25 PM EST -----  [facility] called and patient cr was elevated so could not do carotid CTA.  I spoke with Dr. Root in [place] and he is willing to see patient without it to evaluate if anything further is needed.  Can you contact patient and find out if he is willing to go to [place] and be seen by Dr. Root, may  not need to have carotid fixed, but he will be able to decide without CTA carotids.  If so let me know and I will put order in.  Thanks!    Left mess for Pt to return call regarding referral to Dr Root      03/11/2021 : Carolina called in regards to patient saying he is not understanding his results     She called asking for results to help patient understand what is going on     Her number [phone]        I called and advised Carolina @ [facility] of per Tess devlin she is going to contact pt and see if he is willing to go to [facility] if so she will call us back and we will send the referral to ANNETTA Coffman

## 2021-03-10 NOTE — TELEPHONE ENCOUNTER
[facility] Mr. Abarca CR was 1.68 when he arrive for his CTA and wanted to know if she wanted to proceed.     Per freda wesley if they think good images can be aquired without contrast do that but if it is needed, that is okay.

## 2021-03-12 ENCOUNTER — TELEPHONE (OUTPATIENT)
Dept: CARDIOLOGY | Facility: CLINIC | Age: 66
End: 2021-03-12

## 2021-03-12 NOTE — TELEPHONE ENCOUNTER
----- Message from EARLINE Armenta sent at 3/11/2021  3:50 PM EST -----  Please advise patient.      Called and advised sister of test results Ct and advised her of what Dr Root said regarding the referral she said she would talk to him and get back with us either t/d or Monday to let us know whether to send the referral through

## 2021-03-23 DIAGNOSIS — I95.1 ORTHOSTATIC HYPOTENSION: Chronic | ICD-10-CM

## 2021-03-23 RX ORDER — FLUDROCORTISONE ACETATE 0.1 MG/1
TABLET ORAL
Qty: 120 TABLET | Refills: 0 | Status: SHIPPED | OUTPATIENT
Start: 2021-03-23 | End: 2021-05-04 | Stop reason: SDUPTHER

## 2021-05-04 ENCOUNTER — LAB (OUTPATIENT)
Dept: CARDIOLOGY | Facility: CLINIC | Age: 66
End: 2021-05-04

## 2021-05-04 ENCOUNTER — OFFICE VISIT (OUTPATIENT)
Dept: CARDIOLOGY | Facility: CLINIC | Age: 66
End: 2021-05-04

## 2021-05-04 VITALS
WEIGHT: 198 LBS | SYSTOLIC BLOOD PRESSURE: 135 MMHG | DIASTOLIC BLOOD PRESSURE: 85 MMHG | HEIGHT: 68 IN | HEART RATE: 56 BPM | BODY MASS INDEX: 30.01 KG/M2 | OXYGEN SATURATION: 98 % | TEMPERATURE: 97.1 F

## 2021-05-04 DIAGNOSIS — R00.2 PALPITATIONS: ICD-10-CM

## 2021-05-04 DIAGNOSIS — E78.5 DYSLIPIDEMIA: Chronic | ICD-10-CM

## 2021-05-04 DIAGNOSIS — R60.9 PERIPHERAL EDEMA: ICD-10-CM

## 2021-05-04 DIAGNOSIS — Z00.00 HEALTHCARE MAINTENANCE: ICD-10-CM

## 2021-05-04 DIAGNOSIS — I65.23 BILATERAL CAROTID ARTERY STENOSIS: ICD-10-CM

## 2021-05-04 DIAGNOSIS — I95.1 ORTHOSTATIC HYPOTENSION: Chronic | ICD-10-CM

## 2021-05-04 DIAGNOSIS — R06.02 SHORTNESS OF BREATH: ICD-10-CM

## 2021-05-04 DIAGNOSIS — J06.9 UPPER RESPIRATORY TRACT INFECTION, UNSPECIFIED TYPE: ICD-10-CM

## 2021-05-04 DIAGNOSIS — I10 ESSENTIAL HYPERTENSION: Chronic | ICD-10-CM

## 2021-05-04 DIAGNOSIS — F17.200 CURRENT EVERY DAY SMOKER: Chronic | ICD-10-CM

## 2021-05-04 DIAGNOSIS — I25.5 ISCHEMIC CARDIOMYOPATHY: Chronic | ICD-10-CM

## 2021-05-04 DIAGNOSIS — E78.5 HYPERLIPIDEMIA, UNSPECIFIED HYPERLIPIDEMIA TYPE: ICD-10-CM

## 2021-05-04 DIAGNOSIS — I25.709 CORONARY ARTERY DISEASE INVOLVING CORONARY BYPASS GRAFT OF NATIVE HEART WITH ANGINA PECTORIS (HCC): Primary | ICD-10-CM

## 2021-05-04 LAB
ANION GAP SERPL CALCULATED.3IONS-SCNC: 13.3 MMOL/L (ref 5–15)
BUN SERPL-MCNC: 10 MG/DL (ref 8–23)
BUN/CREAT SERPL: 7.4 (ref 7–25)
CALCIUM SPEC-SCNC: 8.6 MG/DL (ref 8.6–10.5)
CHLORIDE SERPL-SCNC: 102 MMOL/L (ref 98–107)
CO2 SERPL-SCNC: 22.7 MMOL/L (ref 22–29)
CREAT SERPL-MCNC: 1.36 MG/DL (ref 0.76–1.27)
GFR SERPL CREATININE-BSD FRML MDRD: 52 ML/MIN/1.73
GLUCOSE SERPL-MCNC: 105 MG/DL (ref 65–99)
MAGNESIUM SERPL-MCNC: 1.6 MG/DL (ref 1.6–2.4)
NT-PROBNP SERPL-MCNC: 2316 PG/ML (ref 0–900)
POTASSIUM SERPL-SCNC: 4.1 MMOL/L (ref 3.5–5.2)
SODIUM SERPL-SCNC: 138 MMOL/L (ref 136–145)

## 2021-05-04 PROCEDURE — 83735 ASSAY OF MAGNESIUM: CPT | Performed by: NURSE PRACTITIONER

## 2021-05-04 PROCEDURE — 36415 COLL VENOUS BLD VENIPUNCTURE: CPT

## 2021-05-04 PROCEDURE — 83880 ASSAY OF NATRIURETIC PEPTIDE: CPT | Performed by: NURSE PRACTITIONER

## 2021-05-04 PROCEDURE — 80048 BASIC METABOLIC PNL TOTAL CA: CPT | Performed by: NURSE PRACTITIONER

## 2021-05-04 PROCEDURE — 99214 OFFICE O/P EST MOD 30 MIN: CPT | Performed by: NURSE PRACTITIONER

## 2021-05-04 RX ORDER — FLUDROCORTISONE ACETATE 0.1 MG/1
TABLET ORAL
Qty: 120 TABLET | Refills: 3 | Status: SHIPPED | OUTPATIENT
Start: 2021-05-04 | End: 2021-11-09 | Stop reason: SDUPTHER

## 2021-05-04 RX ORDER — ISOSORBIDE MONONITRATE 30 MG/1
15 TABLET, EXTENDED RELEASE ORAL EVERY EVENING
Qty: 90 TABLET | Refills: 3 | Status: SHIPPED | OUTPATIENT
Start: 2021-05-04 | End: 2021-11-09 | Stop reason: SDUPTHER

## 2021-05-04 RX ORDER — CEPHALEXIN 500 MG/1
500 CAPSULE ORAL 3 TIMES DAILY
Qty: 15 CAPSULE | Refills: 0 | Status: SHIPPED | OUTPATIENT
Start: 2021-05-04 | End: 2021-11-09

## 2021-05-04 NOTE — PATIENT INSTRUCTIONS
Steps to Quit Smoking  Smoking tobacco is the leading cause of preventable death. It can affect almost every organ in the body. Smoking puts you and those around you at risk for developing many serious chronic diseases. Quitting smoking can be difficult, but it is one of the best things that you can do for your health. It is never too late to quit.  How do I get ready to quit?  When you decide to quit smoking, create a plan to help you succeed. Before you quit:  · Pick a date to quit. Set a date within the next 2 weeks to give you time to prepare.  · Write down the reasons why you are quitting. Keep this list in places where you will see it often.  · Tell your family, friends, and co-workers that you are quitting. Support from your loved ones can make quitting easier.  · Talk with your health care provider about your options for quitting smoking.  · Find out what treatment options are covered by your health insurance.  · Identify people, places, things, and activities that make you want to smoke (triggers). Avoid them.  What first steps can I take to quit smoking?  · Throw away all cigarettes at home, at work, and in your car.  · Throw away smoking accessories, such as ashtrays and lighters.  · Clean your car. Make sure to empty the ashtray.  · Clean your home, including curtains and carpets.  What strategies can I use to quit smoking?  Talk with your health care provider about combining strategies, such as taking medicines while you are also receiving in-person counseling. Using these two strategies together makes you more likely to succeed in quitting than if you used either strategy on its own.  · If you are pregnant or breastfeeding, talk with your health care provider about finding counseling or other support strategies to quit smoking. Do not take medicine to help you quit smoking unless your health care provider tells you to do so.  To quit smoking:  Quit right away  · Quit smoking completely, instead of  gradually reducing how much you smoke over a period of time. Research shows that stopping smoking right away is more successful than gradually quitting.  · Attend in-person counseling to help you build problem-solving skills. You are more likely to succeed in quitting if you attend counseling sessions regularly. Even short sessions of 10 minutes can be effective.  Take medicine  You may take medicines to help you quit smoking. Some medicines require a prescription and some you can purchase over-the-counter. Medicines may have nicotine in them to replace the nicotine in cigarettes. Medicines may:  · Help to stop cravings.  · Help to relieve withdrawal symptoms.  Your health care provider may recommend:  · Nicotine patches, gum, or lozenges.  · Nicotine inhalers or sprays.  · Non-nicotine medicine that is taken by mouth.  Find resources  Find resources and support systems that can help you to quit smoking and remain smoke-free after you quit. These resources are most helpful when you use them often. They include:  · Online chats with a counselor.  · Telephone quitlines.  · Printed self-help materials.  · Support groups or group counseling.  · Text messaging programs.  · Mobile phone apps or applications. Use apps that can help you stick to your quit plan by providing reminders, tips, and encouragement. There are many free apps for mobile devices as well as websites. Examples include Quit Guide from the CDC and smokefree.gov  What things can I do to make it easier to quit?    · Reach out to your family and friends for support and encouragement. Call telephone quitlines (6-298-QUIT-NOW), reach out to support groups, or work with a counselor for support.  · Ask people who smoke to avoid smoking around you.  · Avoid places that trigger you to smoke, such as bars, parties, or smoke-break areas at work.  · Spend time with people who do not smoke.  · Lessen the stress in your life. Stress can be a smoking trigger for some  people. To lessen stress, try:  ? Exercising regularly.  ? Doing deep-breathing exercises.  ? Doing yoga.  ? Meditating.  ? Performing a body scan. This involves closing your eyes, scanning your body from head to toe, and noticing which parts of your body are particularly tense. Try to relax the muscles in those areas.  How will I feel when I quit smoking?  Day 1 to 3 weeks  Within the first 24 hours of quitting smoking, you may start to feel withdrawal symptoms. These symptoms are usually most noticeable 2-3 days after quitting, but they usually do not last for more than 2-3 weeks. You may experience these symptoms:  · Mood swings.  · Restlessness, anxiety, or irritability.  · Trouble concentrating.  · Dizziness.  · Strong cravings for sugary foods and nicotine.  · Mild weight gain.  · Constipation.  · Nausea.  · Coughing or a sore throat.  · Changes in how the medicines that you take for unrelated issues work in your body.  · Depression.  · Trouble sleeping (insomnia).  Week 3 and afterward  After the first 2-3 weeks of quitting, you may start to notice more positive results, such as:  · Improved sense of smell and taste.  · Decreased coughing and sore throat.  · Slower heart rate.  · Lower blood pressure.  · Clearer skin.  · The ability to breathe more easily.  · Fewer sick days.  Quitting smoking can be very challenging. Do not get discouraged if you are not successful the first time. Some people need to make many attempts to quit before they achieve long-term success. Do your best to stick to your quit plan, and talk with your health care provider if you have any questions or concerns.  Summary  · Smoking tobacco is the leading cause of preventable death. Quitting smoking is one of the best things that you can do for your health.  · When you decide to quit smoking, create a plan to help you succeed.  · Quit smoking right away, not slowly over a period of time.  · When you start quitting, seek help from your  health care provider, family, or friends.  This information is not intended to replace advice given to you by your health care provider. Make sure you discuss any questions you have with your health care provider.  Document Revised: 09/11/2020 Document Reviewed: 03/07/2020  [employer] Patient Education © 2021 [employer]    Advance Care Planning and Advance Directives     You make decisions on a daily basis - decisions about where you want to live, your career, your home, your life. Perhaps one of the most important decisions you face is your choice for future medical care. Take time to talk with your family and your healthcare team and start planning today.  Advance Care Planning is a process that can help you:  · Understand possible future healthcare decisions in light of your own experiences  · Reflect on those decision in light of your goals and values  · Discuss your decisions with those closest to you and the healthcare professionals that care for you  · Make a plan by creating a document that reflects your wishes    Surrogate Decision Maker  In the event of a medical emergency, which has left you unable to communicate or to make your own decisions, you would need someone to make decisions for you.  It is important to discuss your preferences for medical treatment with this person while you are in good health.     Qualities of a surrogate decision maker:  • Willing to take on this role and responsibility  • Knows what you want for future medical care  • Willing to follow your wishes even if they don't agree with them  • Able to make difficult medical decisions under stressful circumstances    Advance Directives  These are legal documents you can create that will guide your healthcare team and decision maker(s) when needed. These documents can be stored in the electronic medical record.    · Living Will - a legal document to guide your care if you have a terminal condition or a serious illness and are unable to  "communicate. States vary by statute in document names/types, but most forms may include one or more of the following:        -  Directions regarding life-prolonging treatments        -  Directions regarding artificially provided nutrition/hydration        -  Choosing a healthcare decision maker        -  Direction regarding organ/tissue donation    · Durable Power of  for Healthcare - this document names an -in-fact to make medical decisions for you, but it may also allow this person to make personal and financial decisions for you. Please seek the advice of an  if you need this type of document.    **Advance Directives are not required and no one may discriminate against you if you do not sign one.    Medical Orders  Many states allow specific forms/orders signed by your physician to record your wishes for medical treatment in your current state of health. This form, signed in personal communication with your physician, addresses resuscitation and other medical interventions that you may or may not want.      For more information or to schedule a time with a [facility] Advance Care Planning Facilitator contact: [facility] or call [phone] and someone will contact you directly.  Carotid Artery Disease    Carotid artery disease, also called carotid artery stenosis, is the narrowing or blockage of one or both carotid arteries. The carotid arteries are the two main blood vessels on either side of the neck. They supply blood to the brain, other parts of the head, and the neck.  Carotid artery disease increases your risk for a stroke or a transient ischemic attack (TIA). A TIA is a \"mini-stroke\" that causes stroke-like symptoms that then go away quickly.  What are the causes?  This condition is mainly caused by a narrowing and hardening of the carotid arteries (atherosclerosis). The carotid arteries can become narrow or clogged with a buildup of fat, cholesterol, calcium, and " other substances (plaque).  What increases the risk?  The following factors may make you more likely to develop this condition:  · Having certain medical conditions, such as:  ? High cholesterol.  ? High blood pressure (hypertension).  ? Diabetes.  ? Obesity.  · Smoking.  · A family history of cardiovascular disease.  · Inactivity or lack of regular exercise.  · Being male. Men have an increased risk of developing atherosclerosis earlier in life than women.  · Old age.  What are the signs or symptoms?  This condition may not have any signs or symptoms until a stroke or TIA occurs. In some cases, your health care provider may be able to hear a whooshing sound (bruit). This can indicate a change in blood flow caused by plaque buildup. An eye exam can also help identify signs of the condition.  How is this diagnosed?  This condition may be diagnosed with a physical exam, your medical history, and your family's medical history. You may also have tests that look at the blood flow in your carotid arteries, such as:  · Carotid artery ultrasound, which uses sound waves to create pictures to show if the arteries are narrow or blocked.  · Tests that use a dye injected into a vein to highlight your arteries on images, such as:  ? Carotid or cerebral angiography, which uses X-rays.  ? Computerized tomographic angiography (CTA), which uses CT scans.  ? Magnetic resonance angiography (MRA), which uses MRI.  How is this treated?  This condition may be treated with a combination of treatments. Treatment options include:  · Lifestyle changes, such as:  ? Quitting smoking.  ? Exercising regularly or as told by your health care provider.  ? Eating a heart-healthy diet.  ? Managing stress.  ? Maintaining a healthy weight.  · Medicines to control blood pressure, cholesterol, and blood clotting.  · Surgery. You may have:  ? A carotid endarterectomy. This is a surgery to remove the blockages in the carotid arteries.  ? A carotid  "angioplasty with stenting. This is a procedure in which a small mesh tube (stent) is used to widen the blocked carotid arteries.  Follow these instructions at home:  Eating and drinking  Follow instructions about your diet from your health care provider. It is important to:  · Eat a healthy diet that is low in saturated fats and includes plenty of fresh fruits, vegetables, and lean meats.  · Avoid foods that are high in fat and salt (sodium).  · Avoid foods that are fried, overly processed, or have poor nutritional value.    Lifestyle    · Maintain a healthy weight.  · Do exercises as told by your health care provider to stay physically active. It is recommended that each week you get at least 150 minutes of moderate-intensity exercise or 75 minutes of exercise that takes a lot of effort.  · Do not use any products that contain nicotine or tobacco, such as cigarettes, e-cigarettes, and chewing tobacco. If you need help quitting, ask your health care provider.  · Do not drink alcohol if:  ? Your health care provider tells you not to drink.  ? You are pregnant, may be pregnant, or are planning to become pregnant.  · If you drink alcohol:  ? Limit how much you use to:  § 0-1 drink a day for women.  § 0-2 drinks a day for men.  ? Be aware of how much alcohol is in your drink. In the [place] one drink equals one 12 oz bottle of beer (355 mL), one 5 oz glass of wine (148 mL), or one 1½ oz glass of hard liquor (44 mL).  · Do not use drugs.  · Manage your stress. Ask your health care provider for stress management tips.  General instructions  · Take over-the-counter and prescription medicines only as told by your health care provider.  · Keep all follow-up visits as told by your health care provider. This is important.  Where to find more information  · American Heart Association: www.heart.org  Get help right away if:  · You have any symptoms of a stroke. \"BE FAST\" is an easy way to remember the main warning signs of a " stroke:  ? B - Balance. Signs are dizziness, sudden trouble walking, or loss of balance.  ? E - Eyes. Signs are trouble seeing or a sudden change in vision.  ? F - Face. Signs are sudden weakness or numbness of the face, or the face or eyelid drooping on one side.  ? A - Arms. Signs are weakness or numbness in an arm. This happens suddenly and usually on one side of the body.  ? S - Speech. Signs are sudden trouble speaking, slurred speech, or trouble understanding what people say.  ? T - Time. Time to call emergency services. Write down what time symptoms started.  · You have other signs of a stroke, such as:  ? A sudden, severe headache with no known cause.  ? Nausea or vomiting.  ? Seizure.  These symptoms may represent a serious problem that is an emergency. Do not wait to see if the symptoms will go away. Get medical help right away. Call your local emergency services (911 in the [place] Do not drive yourself to the hospital.  Summary  · Carotid artery disease, also called carotid artery stenosis, is the narrowing or blockage of one or both carotid arteries.  · Carotid artery disease increases your risk for a stroke or a transient ischemic attack (TIA).  · This condition can be treated with lifestyle changes, medicines, surgery, or a combination of these treatments.  · Get help right away if you have any symptoms of stroke. The acronym BEFAST is an easy way to remember the main warning signs of stroke.  This information is not intended to replace advice given to you by your health care provider. Make sure you discuss any questions you have with your health care provider.  Document Revised: 06/29/2020 Document Reviewed: 06/29/2020  [employer] Patient Education © 2021 [employer]
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
## 2021-05-05 ENCOUNTER — TELEPHONE (OUTPATIENT)
Dept: CARDIOLOGY | Facility: CLINIC | Age: 66
End: 2021-05-05

## 2021-05-05 DIAGNOSIS — R42 DIZZINESS: ICD-10-CM

## 2021-05-05 DIAGNOSIS — R93.89 ABNORMAL ULTRASOUND OF CAROTID ARTERY: Primary | ICD-10-CM

## 2021-05-05 NOTE — TELEPHONE ENCOUNTER
----- Message from EARLINE Armenta sent at 5/5/2021  7:01 AM EDT -----  Fluid marker and CR are improving.  Order CTA of carotids due to abnormal Carotid artery US, dizziness.  He could not have before due to Cr.  Thanks!

## 2021-05-05 NOTE — TELEPHONE ENCOUNTER
proBNP   0.0 - 900.0 pg/mL 2,316.0High   4,127.0High   2,109.0High       Creatinine   0.76 - 1.27 mg/dL 1.36High   1.48High   1.48High       Called and spoke w/ pt's SO, informed her of the results and message from Tess, she verbalized understanding.

## 2021-08-20 DIAGNOSIS — I25.709 CORONARY ARTERY DISEASE INVOLVING CORONARY BYPASS GRAFT OF NATIVE HEART WITH ANGINA PECTORIS (HCC): ICD-10-CM

## 2021-08-20 RX ORDER — NITROGLYCERIN 0.4 MG/1
TABLET SUBLINGUAL
Qty: 25 TABLET | Refills: 3 | Status: SHIPPED | OUTPATIENT
Start: 2021-08-20 | End: 2021-11-09 | Stop reason: SDUPTHER

## 2021-11-01 NOTE — PATIENT INSTRUCTIONS
"Carotid Artery Disease  The carotid arteries are the two main arteries on either side of the neck that supply blood to the brain. Carotid artery disease, also called carotid artery stenosis, is the narrowing or blockage of one or both carotid arteries. Carotid artery disease increases your risk for a stroke or a transient ischemic attack (TIA). A TIA is an episode in which a waxy, fatty substance that accumulates within the artery (plaque) blocks blood flow to the brain. A TIA is considered a \"warning stroke.\"   CAUSES   · Buildup of plaque inside the carotid arteries (atherosclerosis) (common).  · A weakened outpouching in an artery (aneurysm).  · Inflammation of the carotid artery (arteritis).  · A fibrous growth within the carotid artery (fibromuscular dysplasia).  · Tissue death within the carotid artery due to radiation treatment (post-radiation necrosis).  · Decreased blood flow due to spasms of the carotid artery (vasospasm).  · Separation of the walls of the carotid artery (carotid dissection).  RISK FACTORS  · High cholesterol (dyslipidemia).    · High blood pressure (hypertension).    · Smoking.    · Obesity.    · Diabetes.    · Family history of cardiovascular disease.    · Inactivity or lack of regular exercise.    · Being male. Men have an increased risk of developing atherosclerosis earlier in life than women.    SYMPTOMS   Carotid artery disease does not cause symptoms.  DIAGNOSIS  Diagnosis of carotid artery disease may include:   · A physical exam. Your health care provider may hear an abnormal sound (bruit) when listening to the carotid arteries.    · Specific tests that look at the blood flow in the carotid arteries. These tests include:      Carotid artery ultrasonography.      Carotid or cerebral angiography.      Computerized tomographic angiography (CTA).      Magnetic resonance angiography (MRA).    TREATMENT   Treatment of carotid artery disease can include a combination of treatments. " Treatment options include:  · Surgery. You may have:      A carotid endarterectomy. This is a surgery to remove the blockages in the carotid arteries.      A carotid angioplasty with stenting. This is a nonsurgical interventional procedure. A wire mesh (stent) is used to widen the blocked carotid arteries.    · Medicines to control blood pressure, cholesterol, and reduce blood clotting (antiplatelet therapy).    · Adjusting your diet.    · Lifestyle changes such as:      Quitting smoking.      Exercising as tolerated or as directed by your health care provider.      Controlling and maintaining a good blood pressure.      Keeping cholesterol levels under control.    HOME CARE INSTRUCTIONS   · Take medicines only as directed by your health care provider. Make sure you understand all your medicine instructions. Do not stop your medicines without talking to your health care provider.    · Follow your health care provider's diet instructions. It is important to eat a healthy diet that is low in saturated fats and includes plenty of fresh fruits, vegetables, and lean meats. High-fat, high-sodium foods as well as foods that are fried, overly processed, or have poor nutritional value should be avoided.  · Maintain a healthy weight.    · Stay physically active. It is recommended that you get at least 30 minutes of activity every day.    · Do not use any tobacco products including cigarettes, chewing tobacco, or electronic cigarettes. If you need help quitting, ask your health care provider.  · Limit alcohol use to:      No more than 2 drinks per day for men.      No more than 1 drink per day for nonpregnant women.    · Do not use illegal drugs.    · Keep all follow-up visits as directed by your health care provider.    SEEK IMMEDIATE MEDICAL CARE IF:   You develop TIA or stroke symptoms. These include:   · Sudden weakness or numbness on one side of the body, such as in the face, arm, or leg.    · Sudden confusion.  "   · Trouble speaking (aphasia) or understanding.    · Sudden trouble seeing out of one or both eyes.    · Sudden trouble walking.    · Dizziness or feeling like you might faint.    · Loss of balance or coordination.    · Sudden severe headache with no known cause.    · Sudden trouble swallowing (dysphagia).    If you have any of these symptoms, call your local emergency services (911 in [place] Do not drive yourself to the clinic or hospital. This is a medical emergency.      This information is not intended to replace advice given to you by your health care provider. Make sure you discuss any questions you have with your health care provider.     Document Released: 03/11/2013 Document Revised: 01/08/2016 Document Reviewed: 06/18/2014  [employer] Interactive Patient Education ©2017 [employer]  You Can Quit Smoking  If you are ready to quit smoking or are thinking about it, congratulations! You have chosen to help yourself be healthier and live longer! There are lots of different ways to quit smoking. Nicotine gum, nicotine patches, a nicotine inhaler, or nicotine nasal spray can help with physical craving. Hypnosis, support groups, and medicines help break the habit of smoking.  TIPS TO GET OFF AND STAY OFF CIGARETTES  · Learn to predict your moods. Do not let a bad situation be your excuse to have a cigarette. Some situations in your life might tempt you to have a cigarette.  · Ask friends and co-workers not to smoke around you.  · Make your home smoke-free.  · Never have \"just one\" cigarette. It leads to wanting another and another. Remind yourself of your decision to quit.  · On a card, make a list of your reasons for not smoking. Read it at least the same number of times a day as you have a cigarette. Tell yourself everyday, \"I do not want to smoke. I choose not to smoke.\"  · Ask someone at home or work to help you with your plan to quit smoking.  · Have something planned after you eat or have a cup of coffee. " "Take a walk or get other exercise to perk you up. This will help to keep you from overeating.  · Try a relaxation exercise to calm you down and decrease your stress. Remember, you may be tense and nervous the first two weeks after you quit. This will pass.  · Find new activities to keep your hands busy. Play with a pen, coin, or rubber band. Doodle or draw things on paper.  · Brush your teeth right after eating. This will help cut down the craving for the taste of tobacco after meals. You can try mouthwash too.  · Try gum, breath mints, or diet candy to keep something in your mouth.  IF YOU SMOKE AND WANT TO QUIT:  · Do not stock up on cigarettes. Never buy a carton. Wait until one pack is finished before you buy another.  · Never carry cigarettes with you at work or at home.  · Keep cigarettes as far away from you as possible. Leave them with someone else.  · Never carry matches or a lighter with you.  · Ask yourself, \"Do I need this cigarette or is this just a reflex?\"  · Bet with someone that you can quit. Put cigarette money in a [employer] bank every morning. If you smoke, you give up the money. If you do not smoke, by the end of the week, you keep the money.  · Keep trying. It takes 21 days to change a habit!  · Talk to your doctor about using medicines to help you quit. These include nicotine replacement gum, lozenges, or skin patches.     This information is not intended to replace advice given to you by your health care provider. Make sure you discuss any questions you have with your health care provider.     Document Released: 10/14/2010 Document Revised: 03/11/2013 Document Reviewed: 05/03/2016  [employer] Interactive Patient Education ©2017 [employer]    " 0

## 2021-11-09 ENCOUNTER — OFFICE VISIT (OUTPATIENT)
Dept: CARDIOLOGY | Facility: CLINIC | Age: 66
End: 2021-11-09

## 2021-11-09 VITALS
HEART RATE: 64 BPM | WEIGHT: 190 LBS | TEMPERATURE: 97.6 F | BODY MASS INDEX: 28.14 KG/M2 | DIASTOLIC BLOOD PRESSURE: 85 MMHG | OXYGEN SATURATION: 97 % | SYSTOLIC BLOOD PRESSURE: 120 MMHG | HEIGHT: 69 IN

## 2021-11-09 DIAGNOSIS — I25.5 ISCHEMIC CARDIOMYOPATHY: ICD-10-CM

## 2021-11-09 DIAGNOSIS — R42 DIZZINESS: ICD-10-CM

## 2021-11-09 DIAGNOSIS — E78.5 HYPERLIPIDEMIA, UNSPECIFIED HYPERLIPIDEMIA TYPE: ICD-10-CM

## 2021-11-09 DIAGNOSIS — R06.02 SHORTNESS OF BREATH: ICD-10-CM

## 2021-11-09 DIAGNOSIS — K21.9 GASTROESOPHAGEAL REFLUX DISEASE, UNSPECIFIED WHETHER ESOPHAGITIS PRESENT: ICD-10-CM

## 2021-11-09 DIAGNOSIS — I25.810 CORONARY ARTERY DISEASE INVOLVING CORONARY BYPASS GRAFT OF NATIVE HEART WITHOUT ANGINA PECTORIS: ICD-10-CM

## 2021-11-09 DIAGNOSIS — R60.9 PERIPHERAL EDEMA: ICD-10-CM

## 2021-11-09 DIAGNOSIS — I95.1 ORTHOSTATIC HYPOTENSION: Chronic | ICD-10-CM

## 2021-11-09 DIAGNOSIS — R07.89 OTHER CHEST PAIN: Primary | ICD-10-CM

## 2021-11-09 DIAGNOSIS — E83.42 HYPOMAGNESEMIA: ICD-10-CM

## 2021-11-09 DIAGNOSIS — E87.6 HYPOKALEMIA: ICD-10-CM

## 2021-11-09 DIAGNOSIS — I25.709 CORONARY ARTERY DISEASE INVOLVING CORONARY BYPASS GRAFT OF NATIVE HEART WITH ANGINA PECTORIS (HCC): ICD-10-CM

## 2021-11-09 DIAGNOSIS — F17.200 CURRENT EVERY DAY SMOKER: Chronic | ICD-10-CM

## 2021-11-09 PROCEDURE — 99214 OFFICE O/P EST MOD 30 MIN: CPT | Performed by: NURSE PRACTITIONER

## 2021-11-09 RX ORDER — ATORVASTATIN CALCIUM 80 MG/1
80 TABLET, FILM COATED ORAL NIGHTLY
Qty: 90 TABLET | Refills: 3 | Status: SHIPPED | OUTPATIENT
Start: 2021-11-09 | End: 2022-09-01 | Stop reason: SDUPTHER

## 2021-11-09 RX ORDER — FLUDROCORTISONE ACETATE 0.1 MG/1
0.1 TABLET ORAL DAILY
Qty: 90 TABLET | Refills: 3 | Status: SHIPPED | OUTPATIENT
Start: 2021-11-09 | End: 2022-06-24

## 2021-11-09 RX ORDER — OMEPRAZOLE 40 MG/1
40 CAPSULE, DELAYED RELEASE ORAL DAILY
Qty: 90 CAPSULE | Refills: 3 | Status: SHIPPED | OUTPATIENT
Start: 2021-11-09 | End: 2022-11-28

## 2021-11-09 RX ORDER — ISOSORBIDE MONONITRATE 30 MG/1
15 TABLET, EXTENDED RELEASE ORAL EVERY EVENING
Qty: 90 TABLET | Refills: 3 | Status: SHIPPED | OUTPATIENT
Start: 2021-11-09 | End: 2022-09-01

## 2021-11-09 RX ORDER — FUROSEMIDE 20 MG/1
20 TABLET ORAL DAILY
Qty: 90 TABLET | Refills: 3 | Status: SHIPPED | OUTPATIENT
Start: 2021-11-09 | End: 2022-02-02

## 2021-11-09 RX ORDER — NITROGLYCERIN 0.4 MG/1
0.4 TABLET SUBLINGUAL
Qty: 25 TABLET | Refills: 3 | Status: SHIPPED | OUTPATIENT
Start: 2021-11-09 | End: 2022-05-17 | Stop reason: SDUPTHER

## 2021-11-09 RX ORDER — FLUDROCORTISONE ACETATE 0.1 MG/1
TABLET ORAL
Qty: 120 TABLET | Refills: 3 | Status: SHIPPED | OUTPATIENT
Start: 2021-11-09 | End: 2021-11-09 | Stop reason: SDUPTHER

## 2021-11-09 RX ORDER — MELATONIN
1000 DAILY
COMMUNITY
End: 2022-05-17

## 2021-11-09 RX ORDER — CLOPIDOGREL BISULFATE 75 MG/1
75 TABLET ORAL DAILY
Qty: 90 TABLET | Refills: 3 | Status: SHIPPED | OUTPATIENT
Start: 2021-11-09 | End: 2022-09-01 | Stop reason: SDUPTHER

## 2021-11-09 RX ORDER — POTASSIUM CHLORIDE 20 MEQ/1
20 TABLET, EXTENDED RELEASE ORAL DAILY
Qty: 90 TABLET | Refills: 3 | Status: SHIPPED | OUTPATIENT
Start: 2021-11-09 | End: 2022-03-03

## 2021-11-09 RX ORDER — MULTIVITAMIN WITH IRON
250 TABLET ORAL DAILY
Qty: 90 TABLET | Refills: 3 | Status: SHIPPED | OUTPATIENT
Start: 2021-11-09 | End: 2022-05-17 | Stop reason: SDUPTHER

## 2021-11-09 NOTE — PATIENT INSTRUCTIONS
Advance Care Planning and Advance Directives     You make decisions on a daily basis - decisions about where you want to live, your career, your home, your life. Perhaps one of the most important decisions you face is your choice for future medical care. Take time to talk with your family and your healthcare team and start planning today.  Advance Care Planning is a process that can help you:  · Understand possible future healthcare decisions in light of your own experiences  · Reflect on those decision in light of your goals and values  · Discuss your decisions with those closest to you and the healthcare professionals that care for you  · Make a plan by creating a document that reflects your wishes    Surrogate Decision Maker  In the event of a medical emergency, which has left you unable to communicate or to make your own decisions, you would need someone to make decisions for you.  It is important to discuss your preferences for medical treatment with this person while you are in good health.     Qualities of a surrogate decision maker:  • Willing to take on this role and responsibility  • Knows what you want for future medical care  • Willing to follow your wishes even if they don't agree with them  • Able to make difficult medical decisions under stressful circumstances    Advance Directives  These are legal documents you can create that will guide your healthcare team and decision maker(s) when needed. These documents can be stored in the electronic medical record.    · Living Will - a legal document to guide your care if you have a terminal condition or a serious illness and are unable to communicate. States vary by statute in document names/types, but most forms may include one or more of the following:        -  Directions regarding life-prolonging treatments        -  Directions regarding artificially provided nutrition/hydration        -  Choosing a healthcare decision maker        -  Direction  regarding organ/tissue donation    · Durable Power of  for Healthcare - this document names an -in-fact to make medical decisions for you, but it may also allow this person to make personal and financial decisions for you. Please seek the advice of an  if you need this type of document.    **Advance Directives are not required and no one may discriminate against you if you do not sign one.    Medical Orders  Many states allow specific forms/orders signed by your physician to record your wishes for medical treatment in your current state of health. This form, signed in personal communication with your physician, addresses resuscitation and other medical interventions that you may or may not want.      For more information or to schedule a time with a [facility] Advance Care Planning Facilitator contact: [email or web address] or call [phone] and someone will contact you directly.
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
## 2022-02-02 DIAGNOSIS — I25.5 ISCHEMIC CARDIOMYOPATHY: ICD-10-CM

## 2022-02-02 DIAGNOSIS — R60.9 PERIPHERAL EDEMA: ICD-10-CM

## 2022-02-02 RX ORDER — FUROSEMIDE 20 MG/1
TABLET ORAL
Qty: 30 TABLET | Refills: 11 | Status: SHIPPED | OUTPATIENT
Start: 2022-02-02 | End: 2022-05-17

## 2022-03-03 DIAGNOSIS — E87.6 HYPOKALEMIA: ICD-10-CM

## 2022-03-03 RX ORDER — POTASSIUM CHLORIDE 20 MEQ/1
TABLET, EXTENDED RELEASE ORAL
Qty: 90 TABLET | Refills: 3 | Status: SHIPPED | OUTPATIENT
Start: 2022-03-03 | End: 2022-05-18 | Stop reason: SDUPTHER

## 2022-04-04 NOTE — TELEPHONE ENCOUNTER
Neighbor answered the phone and stated pt was with her, but that he can barely speak. Pt spoke and stated that I had person to speak w/ pt, but was barely able to understand him.   I explained that we had tried to reach ptm but got no response. Went over lab results and stated that Tess wants to refer pt to nephrologist. Neighbor stated that pt states he's always had kidney problems. I informed her that pt's creatinine was normal last year and is now out of range, which is why we're referring pt. She asked that nephrologist be in [place] so she can drive him. I informed her that I would ask Tess, but I believe the nephrologist is nearby.     (entered referral)   Plan: Recommended scalp PRP injections x 5 treatments\\n\\nConsider dermaplane vs laser hair removal vs threading for facial hair growth Continue Regimen: Apply minoxidil 10%/triamcinolone 0.1%/azelaic acid 1.5% to the scalp every other night Continue Regimen: AM:\\nWash face with Cetaphil cleanser \\nPat skin dry\\nApply Winlevi to the entire face\\nApply SBS EVENTONE to face\\nApply moisturizer with SPF30 or higher \\n\\nPM:\\nWash face with Cetaphil cleanser\\nPat skin dry\\nApply Triluma to the entire face every other night for 2-3 weeks. Then nightly as tolerated \\nApply SBS EVENTONE to face Detail Level: Simple

## 2022-05-17 ENCOUNTER — OFFICE VISIT (OUTPATIENT)
Dept: CARDIOLOGY | Facility: CLINIC | Age: 67
End: 2022-05-17

## 2022-05-17 ENCOUNTER — LAB (OUTPATIENT)
Dept: CARDIOLOGY | Facility: CLINIC | Age: 67
End: 2022-05-17

## 2022-05-17 VITALS
HEIGHT: 69 IN | TEMPERATURE: 97.5 F | DIASTOLIC BLOOD PRESSURE: 98 MMHG | SYSTOLIC BLOOD PRESSURE: 148 MMHG | WEIGHT: 192 LBS | BODY MASS INDEX: 28.44 KG/M2 | HEART RATE: 59 BPM | OXYGEN SATURATION: 99 %

## 2022-05-17 DIAGNOSIS — R07.89 OTHER CHEST PAIN: Primary | ICD-10-CM

## 2022-05-17 DIAGNOSIS — R06.02 SHORTNESS OF BREATH: ICD-10-CM

## 2022-05-17 DIAGNOSIS — I25.5 ISCHEMIC CARDIOMYOPATHY: Chronic | ICD-10-CM

## 2022-05-17 DIAGNOSIS — I10 PRIMARY HYPERTENSION: Chronic | ICD-10-CM

## 2022-05-17 DIAGNOSIS — I95.1 ORTHOSTATIC HYPOTENSION: Chronic | ICD-10-CM

## 2022-05-17 DIAGNOSIS — F17.200 CURRENT EVERY DAY SMOKER: Chronic | ICD-10-CM

## 2022-05-17 DIAGNOSIS — I51.89 GRADE I DIASTOLIC DYSFUNCTION: ICD-10-CM

## 2022-05-17 DIAGNOSIS — I65.23 BILATERAL CAROTID ARTERY STENOSIS: ICD-10-CM

## 2022-05-17 DIAGNOSIS — I25.10 CORONARY ARTERY DISEASE INVOLVING NATIVE CORONARY ARTERY OF NATIVE HEART WITHOUT ANGINA PECTORIS: Chronic | ICD-10-CM

## 2022-05-17 DIAGNOSIS — R60.9 PERIPHERAL EDEMA: ICD-10-CM

## 2022-05-17 DIAGNOSIS — E78.5 DYSLIPIDEMIA: Chronic | ICD-10-CM

## 2022-05-17 DIAGNOSIS — E83.42 HYPOMAGNESEMIA: ICD-10-CM

## 2022-05-17 PROBLEM — I25.709 CORONARY ARTERY DISEASE INVOLVING CORONARY BYPASS GRAFT OF NATIVE HEART WITH ANGINA PECTORIS: Status: RESOLVED | Noted: 2018-09-19 | Resolved: 2022-05-17

## 2022-05-17 LAB
ANION GAP SERPL CALCULATED.3IONS-SCNC: 13.1 MMOL/L (ref 5–15)
BUN SERPL-MCNC: 16 MG/DL (ref 8–23)
BUN/CREAT SERPL: 10.2 (ref 7–25)
CALCIUM SPEC-SCNC: 8.9 MG/DL (ref 8.6–10.5)
CHLORIDE SERPL-SCNC: 94 MMOL/L (ref 98–107)
CO2 SERPL-SCNC: 21.9 MMOL/L (ref 22–29)
CREAT SERPL-MCNC: 1.57 MG/DL (ref 0.76–1.27)
EGFRCR SERPLBLD CKD-EPI 2021: 47.4 ML/MIN/1.73
GLUCOSE SERPL-MCNC: 90 MG/DL (ref 65–99)
NT-PROBNP SERPL-MCNC: 2782 PG/ML (ref 0–900)
POTASSIUM SERPL-SCNC: 4.1 MMOL/L (ref 3.5–5.2)
SODIUM SERPL-SCNC: 129 MMOL/L (ref 136–145)

## 2022-05-17 PROCEDURE — 83880 ASSAY OF NATRIURETIC PEPTIDE: CPT | Performed by: NURSE PRACTITIONER

## 2022-05-17 PROCEDURE — 80048 BASIC METABOLIC PNL TOTAL CA: CPT | Performed by: NURSE PRACTITIONER

## 2022-05-17 PROCEDURE — 36415 COLL VENOUS BLD VENIPUNCTURE: CPT

## 2022-05-17 PROCEDURE — 93000 ELECTROCARDIOGRAM COMPLETE: CPT | Performed by: NURSE PRACTITIONER

## 2022-05-17 PROCEDURE — 99214 OFFICE O/P EST MOD 30 MIN: CPT | Performed by: NURSE PRACTITIONER

## 2022-05-17 RX ORDER — FUROSEMIDE 20 MG/1
20 TABLET ORAL EVERY OTHER DAY
Qty: 30 TABLET | Refills: 11
Start: 2022-05-17 | End: 2022-05-18 | Stop reason: SDUPTHER

## 2022-05-17 RX ORDER — NITROGLYCERIN 0.4 MG/1
0.4 TABLET SUBLINGUAL
Qty: 25 TABLET | Refills: 3 | Status: SHIPPED | OUTPATIENT
Start: 2022-05-17
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
## 2022-05-18 ENCOUNTER — TELEPHONE (OUTPATIENT)
Dept: CARDIOLOGY | Facility: CLINIC | Age: 67
End: 2022-05-18

## 2022-05-18 DIAGNOSIS — R60.9 PERIPHERAL EDEMA: ICD-10-CM

## 2022-05-18 DIAGNOSIS — I25.5 ISCHEMIC CARDIOMYOPATHY: Chronic | ICD-10-CM

## 2022-05-18 DIAGNOSIS — E87.6 HYPOKALEMIA: ICD-10-CM

## 2022-05-18 DIAGNOSIS — R06.02 SOB (SHORTNESS OF BREATH): Primary | ICD-10-CM

## 2022-05-18 RX ORDER — FUROSEMIDE 40 MG/1
40 TABLET ORAL DAILY
Start: 2022-05-18 | End: 2022-06-02 | Stop reason: SDUPTHER

## 2022-05-18 RX ORDER — POTASSIUM CHLORIDE 20 MEQ/1
TABLET, EXTENDED RELEASE ORAL
Qty: 90 TABLET | Refills: 3 | Status: SHIPPED | OUTPATIENT
Start: 2022-05-18 | End: 2022-06-03 | Stop reason: SDUPTHER

## 2022-05-18 NOTE — TELEPHONE ENCOUNTER
----- Message from EARLINE Armenta sent at 5/18/2022  3:57 PM EDT -----  BNP is elevated.  Increase lasix to 40 mg PO daily and increase KCL to 40 meq PO daily for 3 days.  Then resume to normal dosing of lasix and KCL.  Have him get a repeat BMP/BNP/Mg next Tues.    He needs to decrease his fluid intake as his Sodium is too low, 129.        Left detailed mess for pt regarding the mess above along with lab results . Pt will need to increase lasix to 40 mg along with potassium to 40 meq daily for 3 days then he will resume back to normal dosing . Pt will need to get repeat labs next wed v.s Tuesday due to EARLINE Shirley is out next Tuesday . Per Verbal order from Tess to repeat the labs on Wed VS Tuesday we need to see if pt wants to repeat labs here or at [facility] for repeat labs ordered and medication updated into chart . I will try again tomorrow to reach pt       proBNP   0.0 - 900.0 pg/mL 2,782.0 High      Sodium   136 - 145 mmol/L 129 Low      Creatinine   0.76 - 1.27 mg/dL 1.57 High      ANNETTA Duarte

## 2022-05-19 NOTE — TELEPHONE ENCOUNTER
----- Message from EARLINE Armenta sent at 5/18/2022  3:57 PM EDT -----  BNP is elevated.  Increase lasix to 40 mg PO daily and increase KCL to 40 meq PO daily for 3 days.  Then resume to normal dosing of lasix and KCL.  Have him get a repeat BMP/BNP/Mg next Tues.     He needs to decrease his fluid intake as his Sodium is too low, 129.        Spoke with pt's sister, Cheryl as on [employer]IP[employer]. States she has wrote down directions for pt and will give them to him.    Verbally understands.    NAVEED MCCOY MA

## 2022-05-24 ENCOUNTER — HOSPITAL ENCOUNTER (OUTPATIENT)
Dept: CARDIOLOGY | Facility: HOSPITAL | Age: 67
Discharge: HOME | End: 2022-05-24

## 2022-05-24 ENCOUNTER — APPOINTMENT (OUTPATIENT)
Dept: CARDIOLOGY | Facility: HOSPITAL | Age: 67
End: 2022-05-24

## 2022-05-24 DIAGNOSIS — I25.5 ISCHEMIC CARDIOMYOPATHY: ICD-10-CM

## 2022-05-24 DIAGNOSIS — I10 PRIMARY HYPERTENSION: ICD-10-CM

## 2022-05-24 DIAGNOSIS — I25.10 CORONARY ARTERY DISEASE INVOLVING NATIVE CORONARY ARTERY OF NATIVE HEART WITHOUT ANGINA PECTORIS: ICD-10-CM

## 2022-05-24 DIAGNOSIS — R07.89 OTHER CHEST PAIN: ICD-10-CM

## 2022-05-24 DIAGNOSIS — R06.02 SHORTNESS OF BREATH: ICD-10-CM

## 2022-05-24 DIAGNOSIS — E78.5 DYSLIPIDEMIA: ICD-10-CM

## 2022-05-24 DIAGNOSIS — F17.200 CURRENT EVERY DAY SMOKER: ICD-10-CM

## 2022-05-24 PROCEDURE — A9500 TC99M SESTAMIBI: HCPCS | Performed by: INTERNAL MEDICINE

## 2022-05-24 PROCEDURE — 93306 TTE W/DOPPLER COMPLETE: CPT | Performed by: INTERNAL MEDICINE

## 2022-05-24 PROCEDURE — 93018 CV STRESS TEST I&R ONLY: CPT | Performed by: INTERNAL MEDICINE

## 2022-05-24 PROCEDURE — 78452 HT MUSCLE IMAGE SPECT MULT: CPT

## 2022-05-24 PROCEDURE — 0 TECHNETIUM SESTAMIBI: Performed by: INTERNAL MEDICINE

## 2022-05-24 PROCEDURE — 93306 TTE W/DOPPLER COMPLETE: CPT

## 2022-05-24 PROCEDURE — 78452 HT MUSCLE IMAGE SPECT MULT: CPT | Performed by: INTERNAL MEDICINE

## 2022-05-24 PROCEDURE — 25010000002 REGADENOSON 0.4 MG/5ML SOLUTION: Performed by: INTERNAL MEDICINE

## 2022-05-24 PROCEDURE — 25010000002 AMINOPHYLLINE PER 250 MG: Performed by: INTERNAL MEDICINE

## 2022-05-24 PROCEDURE — 93017 CV STRESS TEST TRACING ONLY: CPT

## 2022-05-24 RX ORDER — AMINOPHYLLINE DIHYDRATE 25 MG/ML
125 INJECTION, SOLUTION INTRAVENOUS
Status: COMPLETED | OUTPATIENT
Start: 2022-05-24 | End: 2022-05-24

## 2022-05-24 RX ADMIN — TECHNETIUM TC 99M SESTAMIBI 1 DOSE: 1 INJECTION INTRAVENOUS at 11:35

## 2022-05-24 RX ADMIN — AMINOPHYLLINE 125 MG: 25 INJECTION, SOLUTION INTRAVENOUS at 11:35

## 2022-05-24 RX ADMIN — REGADENOSON 0.4 MG: 0.08 INJECTION, SOLUTION INTRAVENOUS at 11:35

## 2022-05-24 RX ADMIN — TECHNETIUM TC 99M SESTAMIBI 1 DOSE: 1 INJECTION INTRAVENOUS at 09:28

## 2022-05-30 LAB
BH CV REST NUCLEAR ISOTOPE DOSE: 10 MCI
BH CV STRESS COMMENTS STAGE 1: NORMAL
BH CV STRESS DOSE REGADENOSON STAGE 1: 0.4
BH CV STRESS DURATION MIN STAGE 1: 0
BH CV STRESS DURATION SEC STAGE 1: 10
BH CV STRESS NUCLEAR ISOTOPE DOSE: 30 MCI
BH CV STRESS PROTOCOL 1: NORMAL
BH CV STRESS RECOVERY BP: NORMAL MMHG
BH CV STRESS RECOVERY HR: 61 BPM
BH CV STRESS STAGE 1: 1
MAXIMAL PREDICTED HEART RATE: 151 BPM
PERCENT MAX PREDICTED HR: 43.05 %
STRESS BASELINE BP: NORMAL MMHG
STRESS BASELINE HR: 48 BPM
STRESS PERCENT HR: 51 %
STRESS POST PEAK BP: NORMAL MMHG
STRESS POST PEAK HR: 65 BPM
STRESS TARGET HR: 128 BPM

## 2022-05-31 ENCOUNTER — TELEPHONE (OUTPATIENT)
Dept: CARDIOLOGY | Facility: CLINIC | Age: 67
End: 2022-05-31

## 2022-06-01 NOTE — TELEPHONE ENCOUNTER
Reached pt's sister, Bernardo, she stated she would let pt know of abnormal results and to come in tomorrow at 3pm.

## 2022-06-02 ENCOUNTER — OFFICE VISIT (OUTPATIENT)
Dept: CARDIOLOGY | Facility: CLINIC | Age: 67
End: 2022-06-02

## 2022-06-02 ENCOUNTER — LAB (OUTPATIENT)
Dept: CARDIOLOGY | Facility: CLINIC | Age: 67
End: 2022-06-02

## 2022-06-02 VITALS
WEIGHT: 191 LBS | OXYGEN SATURATION: 98 % | HEIGHT: 69 IN | DIASTOLIC BLOOD PRESSURE: 76 MMHG | SYSTOLIC BLOOD PRESSURE: 124 MMHG | HEART RATE: 54 BPM | BODY MASS INDEX: 28.29 KG/M2

## 2022-06-02 DIAGNOSIS — I25.5 ISCHEMIC CARDIOMYOPATHY: Chronic | ICD-10-CM

## 2022-06-02 DIAGNOSIS — E78.5 DYSLIPIDEMIA: Chronic | ICD-10-CM

## 2022-06-02 DIAGNOSIS — R60.9 PERIPHERAL EDEMA: ICD-10-CM

## 2022-06-02 DIAGNOSIS — I65.23 BILATERAL CAROTID ARTERY STENOSIS: ICD-10-CM

## 2022-06-02 DIAGNOSIS — R94.39 ABNORMAL CARDIOVASCULAR STRESS TEST: ICD-10-CM

## 2022-06-02 DIAGNOSIS — I51.89 GRADE I DIASTOLIC DYSFUNCTION: ICD-10-CM

## 2022-06-02 DIAGNOSIS — R00.2 PALPITATIONS: ICD-10-CM

## 2022-06-02 DIAGNOSIS — I25.10 CORONARY ARTERY DISEASE INVOLVING NATIVE CORONARY ARTERY OF NATIVE HEART WITHOUT ANGINA PECTORIS: Primary | Chronic | ICD-10-CM

## 2022-06-02 DIAGNOSIS — I95.1 ORTHOSTATIC HYPOTENSION: Chronic | ICD-10-CM

## 2022-06-02 DIAGNOSIS — R42 DIZZINESS: ICD-10-CM

## 2022-06-02 DIAGNOSIS — F17.200 CURRENT EVERY DAY SMOKER: Chronic | ICD-10-CM

## 2022-06-02 DIAGNOSIS — E87.6 HYPOKALEMIA: ICD-10-CM

## 2022-06-02 DIAGNOSIS — R06.02 SOB (SHORTNESS OF BREATH): ICD-10-CM

## 2022-06-02 DIAGNOSIS — I10 PRIMARY HYPERTENSION: Chronic | ICD-10-CM

## 2022-06-02 DIAGNOSIS — R06.02 SHORTNESS OF BREATH: ICD-10-CM

## 2022-06-02 PROBLEM — I21.9 MYOCARDIAL INFARCTION (HCC): Status: ACTIVE | Noted: 2018-11-14

## 2022-06-02 LAB
ANION GAP SERPL CALCULATED.3IONS-SCNC: 12.7 MMOL/L (ref 5–15)
BUN SERPL-MCNC: 10 MG/DL (ref 8–23)
BUN/CREAT SERPL: 6.3 (ref 7–25)
CALCIUM SPEC-SCNC: 8.7 MG/DL (ref 8.6–10.5)
CHLORIDE SERPL-SCNC: 98 MMOL/L (ref 98–107)
CO2 SERPL-SCNC: 21.3 MMOL/L (ref 22–29)
CREAT SERPL-MCNC: 1.59 MG/DL (ref 0.76–1.27)
DEPRECATED RDW RBC AUTO: 51.4 FL (ref 37–54)
EGFRCR SERPLBLD CKD-EPI 2021: 46.7 ML/MIN/1.73
ERYTHROCYTE [DISTWIDTH] IN BLOOD BY AUTOMATED COUNT: 15 % (ref 12.3–15.4)
GLUCOSE SERPL-MCNC: 117 MG/DL (ref 65–99)
HCT VFR BLD AUTO: 40 % (ref 37.5–51)
HGB BLD-MCNC: 12.7 G/DL (ref 13–17.7)
MAGNESIUM SERPL-MCNC: 1.7 MG/DL (ref 1.6–2.4)
MCH RBC QN AUTO: 29.5 PG (ref 26.6–33)
MCHC RBC AUTO-ENTMCNC: 31.8 G/DL (ref 31.5–35.7)
MCV RBC AUTO: 93 FL (ref 79–97)
NT-PROBNP SERPL-MCNC: 3443 PG/ML (ref 0–900)
PLATELET # BLD AUTO: 195 10*3/MM3 (ref 140–450)
PMV BLD AUTO: 11.8 FL (ref 6–12)
POTASSIUM SERPL-SCNC: 4.4 MMOL/L (ref 3.5–5.2)
RBC # BLD AUTO: 4.3 10*6/MM3 (ref 4.14–5.8)
SODIUM SERPL-SCNC: 132 MMOL/L (ref 136–145)
WBC NRBC COR # BLD: 8.29 10*3/MM3 (ref 3.4–10.8)

## 2022-06-02 PROCEDURE — 99214 OFFICE O/P EST MOD 30 MIN: CPT | Performed by: NURSE PRACTITIONER

## 2022-06-02 PROCEDURE — 80048 BASIC METABOLIC PNL TOTAL CA: CPT | Performed by: NURSE PRACTITIONER

## 2022-06-02 PROCEDURE — 83880 ASSAY OF NATRIURETIC PEPTIDE: CPT | Performed by: NURSE PRACTITIONER

## 2022-06-02 PROCEDURE — 36415 COLL VENOUS BLD VENIPUNCTURE: CPT

## 2022-06-02 PROCEDURE — 85027 COMPLETE CBC AUTOMATED: CPT | Performed by: NURSE PRACTITIONER

## 2022-06-02 PROCEDURE — 83735 ASSAY OF MAGNESIUM: CPT | Performed by: NURSE PRACTITIONER

## 2022-06-02 RX ORDER — FUROSEMIDE 20 MG/1
20 TABLET ORAL DAILY
COMMUNITY
End: 2022-06-03 | Stop reason: SDUPTHER

## 2022-06-02 NOTE — PROGRESS NOTES
Subjective   Darnell Abarca is a 67 y.o. male     Chief Complaint   Patient presents with   • Abnormal stress test follow up       HPI    Problem List:     1. CAD  1.1 CABG x 3 @ [facility] 2003  1.2 [facility] 6/2015 - patient grafts with disease in the native vessels with medical management recommendation; EF 40%  1.3 stress test 12/10/18-apical and inferoapical ischemia, depressed post stress EF 44%  1.4 left heart cath 7/9/19-left main 20%, LAD 60 to 70% stenosis, patent LIMA to distal LAD which also has diffuse distal vessel disease, first obtuse marginal branch 100% occluded with a patent saphenous vein graft to the posterior lateral branches of the left circumflex, right coronary artery 100% occluded the graft to this vessel is also occluded however there is collateral filling to the left circumflex system EF 40%, systolic hypertension  1.5 stress test 5/24/2022-moderate size, dense and incompletely reversible defect involving the distal anterior, anteroapical and apical segments compatible with ischemia ± she had infarct, post-rest EF of 40% with septal hypokinesis.  2. Ischemic Cardiomyopathy   2.1 Echo 12/7/2020-EF 50%, mild LVH, diastolic function 1, trivial MR, physiological TR, dilated proximal aortic root 4.4 cm  3. CHF; class II-III symptoms   4. Dyslipidemia   5. Orthostatic hypotension  6. COPD  7. History of throat CA  8. Event Monitor 5/1-5/14/17 - SB - NSR with PVC  9. Carotid Artery Disease   9.1 carotid artery ultrasound 12/7/2020-moderate bifurcation disease on the left with less than or equal to 50% stenosis on the right and 50 to 75% stenosis by echo imaging extending into the proximal left internal carotid artery, 16 to 49% stenosis both internal carotid arteries more prominent on the left, antegrade flow both vertebral arteries; potentially hemodynamically significant stenosis on the left  9.2 CTA of the neck 5/13/2021-scattered calcified atherosclerotic change particular involving the internal carotid  arteries right greater than left, no definitive flow-limiting stenosis, paranasal sinus disease suggesting mastoiditis  10. Smoking Habituation   11.  Thoracic aortic aneurysm (NO Aneurysm per most recent CT)  11.1 CT of the chest 1/10/2020 -4 cm ascending aortic aneurysm  11.2 CT of the chest 3/10/2021-normal caliber of a sending thoracic aorta, bilateral lower lobe mild bronchial wall thickening  12.  Moderna Vaccination 2/2021; 3/2021; 11/2021    Patient is a 67-year-old male who presents today for follow-up on testing with his daughters at his side.  He denies any chest pain, pressure, palpitations, fluttering, presyncope, syncope, orthopnea, PND or edema.  He says he does have dizziness if he stands for little bit.  He does get short of breath with very short distance and has to stop and rest.      We went over stress test.  His last LDL was 66.  We will get repeat labs.    Current Outpatient Medications on File Prior to Visit   Medication Sig Dispense Refill   • albuterol (PROVENTIL) (2.5 MG/3ML) 0.083% nebulizer solution Take 2.5 mg by nebulization Every 4 (Four) Hours As Needed for Wheezing. 3 mL 12   • atorvastatin (LIPITOR) 80 MG tablet Take 1 tablet by mouth Every Night. 90 tablet 3   • clopidogrel (PLAVIX) 75 MG tablet Take 1 tablet by mouth Daily. 90 tablet 3   • Cyanocobalamin 1000 MCG/ML kit Inject 2 mL as directed Every 30 (Thirty) Days.     • fludrocortisone 0.1 MG tablet Take 1 tablet by mouth Daily. 90 tablet 3   • furosemide (LASIX) 20 MG tablet Take 20 mg by mouth Daily.     • isosorbide mononitrate (IMDUR) 30 MG 24 hr tablet Take 0.5 tablets by mouth Every Evening. 90 tablet 3   • magnesium oxide 250 MG tablet TAKE ONE TABLET EVERY DAY 90 tablet 3   • nitroglycerin (NITROSTAT) 0.4 MG SL tablet Place 1 tablet under the tongue Every 5 (Five) Minutes As Needed for Chest Pain. Take no more than 3 doses in 15 minutes. 25 tablet 3   • omeprazole (PrilOSEC) 40 MG capsule Take 1 capsule by mouth Daily.  90 capsule 3   • oxyCODONE-acetaminophen (PERCOCET)  MG per tablet Take 1 tablet by mouth Every 8 (Eight) Hours As Needed.     • potassium chloride (K-DUR,KLOR-CON) 20 MEQ CR tablet Take 2 tablets by mouth 2 times a day for the next 3 days then resume back to 20 meq daily start date on 05/19/2022 90 tablet 3   • promethazine (PHENERGAN) 25 MG tablet Take 25 mg by mouth Every 6 (Six) Hours As Needed for Nausea or Vomiting.     • [DISCONTINUED] furosemide (LASIX) 40 MG tablet Take 1 tablet by mouth Daily. Take 40 mg of lasix by mouth for the next 3 days then resume back to 20 mg every other day start date 05/19/2022 (Patient taking differently: Take 20 mg by mouth Daily. Take 40 mg of lasix by mouth for the next 3 days then resume back to 20 mg every other day start date 05/19/2022)       No current facility-administered medications on file prior to visit.       ALLERGIES    Nsaids, Aspirin, Codeine, Ethanolamine, Other, Salicylates, Theophylline, and Triprolidine-pseudoephedrine    Past Medical History:   Diagnosis Date   • CAD (coronary artery disease)    • COPD (chronic obstructive pulmonary disease) (HCC)    • COVID-19 vaccine administered 03/01/2021 03/29/2021 Moderna - Booster ( 11/02/2021) Moderna    • Current every day smoker     1.5-2 PPD   • Dizziness    • Dyslipidemia    • Edema    • History of throat cancer    • Hyperlipidemia    • Hypertension    • Ischemic cardiomyopathy    • Lightheaded    • Myocardial infarction (HCC)    • Orthostatic hypotension    • SOB (shortness of breath)    • Stroke (HCC)        Social History     Socioeconomic History   • Marital status:    Tobacco Use   • Smoking status: Current Every Day Smoker     Packs/day: 2.00     Years: 40.00     Pack years: 80.00     Types: Electronic Cigarette, Cigarettes   • Smokeless tobacco: Never Used   Substance and Sexual Activity   • Alcohol use: Yes     Comment: occasionally   • Drug use: No   • Sexual activity: Defer  "      Family History   Problem Relation Age of Onset   • Diabetes Mother    • Hypertension Mother    • Hypertension Father    • Hyperlipidemia Sister         Familial hypercholesterolemia   • Hypertension Sister    • Hyperlipidemia Brother         Familial hypercholesterolemia   • Heart attack Brother         Acute MI   • Stroke Brother         CVA   • Hypertension Brother    • Heart disease Brother         pacemaker placement   • Other Brother         Pacemaker placement       Review of Systems   Constitutional: Negative for chills, diaphoresis, fatigue and fever.   HENT: Positive for congestion (chest congestion) and hearing loss. Negative for sinus pressure.    Eyes: Positive for visual disturbance (wears glasses ).   Respiratory: Positive for shortness of breath (from p/l to our lobby; will stop and rest ). Negative for chest tightness.    Cardiovascular: Negative for chest pain, palpitations and leg swelling.   Gastrointestinal: Positive for abdominal pain. Negative for blood in stool, constipation, diarrhea, nausea and vomiting.   Endocrine: Negative for cold intolerance and heat intolerance.   Genitourinary: Negative for dysuria, frequency, hematuria and urgency.   Musculoskeletal: Positive for arthralgias, back pain, gait problem and neck pain.   Skin: Positive for wound (scabs on right arm). Negative for rash.   Allergic/Immunologic: Negative for environmental allergies and food allergies.   Neurological: Positive for dizziness (if he stands more than a few min). Negative for syncope and light-headedness.   Hematological: Bruises/bleeds easily.   Psychiatric/Behavioral: Negative for sleep disturbance (denies waking with soa or cp).       Objective   /76 (BP Location: Left arm, Patient Position: Sitting)   Pulse 54   Ht 175.3 cm (69\")   Wt 86.6 kg (191 lb)   SpO2 98%   BMI 28.21 kg/m²   Vitals:    06/02/22 1456   BP: 124/76   BP Location: Left arm   Patient Position: Sitting   Pulse: 54   SpO2: " "98%   Weight: 86.6 kg (191 lb)   Height: 175.3 cm (69\")      Lab Results (most recent)     None        Physical Exam  Vitals reviewed.   Constitutional:       General: He is awake.      Appearance: Normal appearance. He is well-developed, well-groomed and overweight.   HENT:      Head: Normocephalic.      Right Ear: Decreased hearing noted.      Left Ear: Decreased hearing noted.   Eyes:      General: Lids are normal.      Comments: Wears glasses    Neck:      Vascular: No carotid bruit, hepatojugular reflux or JVD.   Cardiovascular:      Rate and Rhythm: Regular rhythm. Bradycardia present.      Pulses:           Radial pulses are 2+ on the right side and 2+ on the left side.        Dorsalis pedis pulses are 2+ on the right side and 2+ on the left side.        Posterior tibial pulses are 2+ on the right side and 2+ on the left side.      Heart sounds: Normal heart sounds.   Pulmonary:      Effort: Pulmonary effort is normal.      Breath sounds: Normal air entry.      Comments: Coarse breath sounds throughout   Abdominal:      General: Bowel sounds are normal.      Palpations: Abdomen is soft.   Musculoskeletal:      Right lower leg: No edema.      Left lower leg: No edema.   Skin:     General: Skin is warm and dry.      Findings: Abrasion (RUE) and bruising (BUE) present.   Neurological:      Mental Status: He is alert and oriented to person, place, and time.   Psychiatric:         Attention and Perception: Attention and perception normal.         Mood and Affect: Mood and affect normal.         Behavior: Behavior normal. Behavior is cooperative.         Thought Content: Thought content normal.         Cognition and Memory: Cognition and memory normal.         Judgment: Judgment normal.      Comments: Forced speech          Procedure   Procedures         Assessment & Plan      Diagnosis Plan   1. Coronary artery disease involving native coronary artery of native heart without angina pectoris  [facility] "   2. Primary hypertension  [facility]    CBC (No Diff)   3. Grade I diastolic dysfunction     4. Dyslipidemia  [facility] Cath   5. Ischemic cardiomyopathy  [facility] Cath   6. Orthostatic hypotension     7. Bilateral carotid artery stenosis     8. Abnormal cardiovascular stress test  [facility]   9. Shortness of breath  [facility] Cath   10. Peripheral edema     11. Current every day smoker  [facility]       Return 6/24 @ 945 am.    CAD/hypertension/hyperlipidemia/ischemic cardiomyopathy/abnormal stress test/shortness of breath/smoking-patient will be referred to Dr. Dowell here for left heart cath.  He had 60 to 70% stenosis in the LAD back in 2019 and that is the area of ischemia showing today.  He will need a second antiplatelet should he receive a stent.  Diastolic dysfunction/ischemic cardiomyopathy/peripheral edema-patient's on Lasix.  Orthostatic hypotension-patient is on Florinef.  Carotid artery disease-patient's on Plavix and statin.  Patient will continue his medication regimen.  He will get a CBC today with the BNP BMP and mag that was already ordered.  He will follow-up on 624 or sooner if any changes.     Patient brought in medicine list to appointment, it's been reviewed with patient and med list was updated in the chart.     Advance Care Planning   ACP discussion was held with the patient during this visit. Patient has an advance directive (not in EMR), copy requested.           Electronically signed by:

## 2022-06-03 ENCOUNTER — TELEPHONE (OUTPATIENT)
Dept: CARDIOLOGY | Facility: CLINIC | Age: 67
End: 2022-06-03

## 2022-06-03 DIAGNOSIS — I10 PRIMARY HYPERTENSION: ICD-10-CM

## 2022-06-03 DIAGNOSIS — R00.2 PALPITATIONS: ICD-10-CM

## 2022-06-03 DIAGNOSIS — R42 DIZZINESS: ICD-10-CM

## 2022-06-03 DIAGNOSIS — E87.6 HYPOKALEMIA: ICD-10-CM

## 2022-06-03 DIAGNOSIS — I25.10 CORONARY ARTERY DISEASE INVOLVING NATIVE CORONARY ARTERY OF NATIVE HEART WITHOUT ANGINA PECTORIS: Primary | ICD-10-CM

## 2022-06-03 DIAGNOSIS — I65.23 BILATERAL CAROTID ARTERY STENOSIS: ICD-10-CM

## 2022-06-03 DIAGNOSIS — R06.02 SHORTNESS OF BREATH: ICD-10-CM

## 2022-06-03 RX ORDER — POTASSIUM CHLORIDE 20 MEQ/1
TABLET, EXTENDED RELEASE ORAL
Qty: 90 TABLET | Refills: 3 | Status: SHIPPED | OUTPATIENT
Start: 2022-06-03 | End: 2022-09-01 | Stop reason: SDUPTHER

## 2022-06-03 RX ORDER — FUROSEMIDE 40 MG/1
40 TABLET ORAL DAILY
Qty: 90 TABLET | Refills: 3 | Status: SHIPPED | OUTPATIENT
Start: 2022-06-03 | End: 2022-07-07 | Stop reason: SDUPTHER

## 2022-06-03 NOTE — TELEPHONE ENCOUNTER
----- Message from EARLINE Armenta sent at 6/3/2022  6:21 AM EDT -----  Please advise patient is fluid marker is higher than before.  He needs to increase his lasix back to 40 mg PO daily. Take KCl 40 meq PO daily as well.  Repeat a BMP/BNP/MG in 1 week. Decrease fluid intake slightly to get sodium back up as ewll.  It is doing better.       Pt's sister was advised of the above mess she states that he will repeat labs in 1 week at [facility] [place] , we will send in mediation for pt to make sure he has enough to do since its the weekend .  Labs ordered and given to Carie to fax to [facility] [place] .medication ordered and updated in chart       Creatinine   0.76 - 1.27 mg/dL 1.59 High      Sodium   136 - 145 mmol/L 132 Low      proBNP   0.0 - 900.0 pg/mL 3,443.0 High      ANNETTA Duarte

## 2022-06-06 ENCOUNTER — TELEPHONE (OUTPATIENT)
Dept: CARDIOLOGY | Facility: CLINIC | Age: 67
End: 2022-06-06

## 2022-06-06 LAB
BH CV ECHO MEAS - ACS: 2.37 CM
BH CV ECHO MEAS - AO MAX PG: 5.6 MMHG
BH CV ECHO MEAS - AO MEAN PG: 2.9 MMHG
BH CV ECHO MEAS - AO ROOT DIAM: 4.1 CM
BH CV ECHO MEAS - AO V2 MAX: 118.1 CM/SEC
BH CV ECHO MEAS - AO V2 VTI: 28.9 CM
BH CV ECHO MEAS - EDV(CUBED): 98 ML
BH CV ECHO MEAS - EDV(MOD-SP4): 111 ML
BH CV ECHO MEAS - EF(MOD-SP4): 43.6 %
BH CV ECHO MEAS - EF_3D-VOL: 50 %
BH CV ECHO MEAS - ESV(CUBED): 56.6 ML
BH CV ECHO MEAS - ESV(MOD-SP4): 62.6 ML
BH CV ECHO MEAS - FS: 16.7 %
BH CV ECHO MEAS - IVS/LVPW: 1.04 CM
BH CV ECHO MEAS - IVSD: 1.5 CM
BH CV ECHO MEAS - LA DIMENSION: 4.4 CM
BH CV ECHO MEAS - LAT PEAK E' VEL: 6.1 CM/SEC
BH CV ECHO MEAS - LV DIASTOLIC VOL/BSA (35-75): 54.7 CM2
BH CV ECHO MEAS - LV MASS(C)D: 277.2 GRAMS
BH CV ECHO MEAS - LV SYSTOLIC VOL/BSA (12-30): 30.8 CM2
BH CV ECHO MEAS - LVIDD: 4.6 CM
BH CV ECHO MEAS - LVIDS: 3.8 CM
BH CV ECHO MEAS - LVOT AREA: 3.8 CM2
BH CV ECHO MEAS - LVOT DIAM: 2.19 CM
BH CV ECHO MEAS - LVPWD: 1.44 CM
BH CV ECHO MEAS - MED PEAK E' VEL: 4.5 CM/SEC
BH CV ECHO MEAS - MV A MAX VEL: 78.8 CM/SEC
BH CV ECHO MEAS - MV DEC SLOPE: 166.3 CM/SEC2
BH CV ECHO MEAS - MV E MAX VEL: 50.1 CM/SEC
BH CV ECHO MEAS - MV E/A: 0.64
BH CV ECHO MEAS - RAP SYSTOLE: 10 MMHG
BH CV ECHO MEAS - RVDD: 3.6 CM
BH CV ECHO MEAS - RVSP: 25.5 MMHG
BH CV ECHO MEAS - SI(MOD-SP4): 23.8 ML/M2
BH CV ECHO MEAS - SV(MOD-SP4): 48.4 ML
BH CV ECHO MEAS - TR MAX PG: 15.5 MMHG
BH CV ECHO MEAS - TR MAX VEL: 196.8 CM/SEC
BH CV ECHO MEASUREMENTS AVERAGE E/E' RATIO: 9.45
LEFT ATRIUM VOLUME INDEX: 26.2 ML/M2
MAXIMAL PREDICTED HEART RATE: 151 BPM
STRESS TARGET HR: 128 BPM

## 2022-06-06 NOTE — TELEPHONE ENCOUNTER
Trisha set up [facility] for pt. Downstairs requires pt to sign his instructions, needs to come in for nurse visit.       First attempt to reach pt. Left a voicemail for pt to return my call at [phone].    With return call, please inform pt of nurse visit Wed at 11am for [facility] instructions. Thank you!

## 2022-06-06 NOTE — TELEPHONE ENCOUNTER
Pt's sister called back and was given [facility] instructions , I mailed the instructions to pt has well and stressed that he will need to be at [facility] 8 am eastern time and he could do a very light breakfast . They were advised to take a medication list and to make sure he had a  . Sister gave verbal understanding of the above info . [facility] is scheduled for 06/17/2022 10 am but to arrive at 8 am .     Pt's sister was given Results has well  Per Tess Vergara ,APRN  :     Please advise patient.  EF is lower than previous Echo.  Pending [facility] will adjust/make medication changes after C regarding low EF.  We are aware of the dilated aorta and no aneurysm, already assessed via CT chest.       Echo - 3D ejection fraction is 50%    ANNETTA Duarte

## 2022-06-07 ENCOUNTER — TELEPHONE (OUTPATIENT)
Dept: CARDIOLOGY | Facility: CLINIC | Age: 67
End: 2022-06-07

## 2022-06-07 DIAGNOSIS — N18.1 CKD (CHRONIC KIDNEY DISEASE) STAGE 1, GFR 90 ML/MIN OR GREATER: Primary | ICD-10-CM

## 2022-06-07 NOTE — TELEPHONE ENCOUNTER
Called and left mess for pt with sister to return call regarding pt will need to have meds for LHC ( Mucomyst ) medication can only be sent to [facility] , [facility] or [facility] . Medication is pending . Once pt or sister calls back ask where they would like meds called into ?         Erica Martinez, Johanna Ortega MA Patietn returned call. They would like to see if [facility] can order Mucomyst       Returned call to pt again at 12:26 pm ( Hayley ) sister advised her that meds can only be called into the 3 places that I clearly put into the chart ( mess above ) sister and pt were fine with going to  medication sent     ANNETTA Duarte

## 2022-06-17 ENCOUNTER — OUTSIDE FACILITY SERVICE (OUTPATIENT)
Dept: CARDIOLOGY | Facility: CLINIC | Age: 67
End: 2022-06-17

## 2022-06-17 PROCEDURE — 93459 L HRT ART/GRFT ANGIO: CPT | Performed by: INTERNAL MEDICINE

## 2022-06-24 ENCOUNTER — OFFICE VISIT (OUTPATIENT)
Dept: CARDIOLOGY | Facility: CLINIC | Age: 67
End: 2022-06-24

## 2022-06-24 ENCOUNTER — TELEPHONE (OUTPATIENT)
Dept: CARDIOLOGY | Facility: CLINIC | Age: 67
End: 2022-06-24

## 2022-06-24 VITALS
DIASTOLIC BLOOD PRESSURE: 89 MMHG | BODY MASS INDEX: 27.64 KG/M2 | OXYGEN SATURATION: 97 % | WEIGHT: 186.6 LBS | HEIGHT: 69 IN | SYSTOLIC BLOOD PRESSURE: 129 MMHG | HEART RATE: 55 BPM

## 2022-06-24 DIAGNOSIS — F17.200 CURRENT EVERY DAY SMOKER: Chronic | ICD-10-CM

## 2022-06-24 DIAGNOSIS — R06.02 SHORTNESS OF BREATH: ICD-10-CM

## 2022-06-24 DIAGNOSIS — I65.23 BILATERAL CAROTID ARTERY STENOSIS: ICD-10-CM

## 2022-06-24 DIAGNOSIS — R42 DIZZINESS: ICD-10-CM

## 2022-06-24 DIAGNOSIS — I10 PRIMARY HYPERTENSION: Chronic | ICD-10-CM

## 2022-06-24 DIAGNOSIS — I25.5 ISCHEMIC CARDIOMYOPATHY: Chronic | ICD-10-CM

## 2022-06-24 DIAGNOSIS — R00.2 PALPITATIONS: ICD-10-CM

## 2022-06-24 DIAGNOSIS — I51.89 GRADE I DIASTOLIC DYSFUNCTION: ICD-10-CM

## 2022-06-24 DIAGNOSIS — R60.9 PERIPHERAL EDEMA: ICD-10-CM

## 2022-06-24 DIAGNOSIS — I25.10 CORONARY ARTERY DISEASE INVOLVING NATIVE CORONARY ARTERY OF NATIVE HEART WITHOUT ANGINA PECTORIS: Primary | Chronic | ICD-10-CM

## 2022-06-24 DIAGNOSIS — E78.5 DYSLIPIDEMIA: Chronic | ICD-10-CM

## 2022-06-24 DIAGNOSIS — I95.1 ORTHOSTATIC HYPOTENSION: Chronic | ICD-10-CM

## 2022-06-24 PROCEDURE — 99214 OFFICE O/P EST MOD 30 MIN: CPT | Performed by: NURSE PRACTITIONER

## 2022-06-24 NOTE — PROGRESS NOTES
Subjective   Darnell Abarca is a 67 y.o. male     Chief Complaint   Patient presents with   • Follow-up     Cath F/U        HPI    Problem List:     1. CAD  1.1 CABG x 3 @ [facility] 2003  1.2 [facility] 6/2015 - patient grafts with disease in the native vessels with medical management recommendation; EF 40%  1.3 stress test 12/10/18-apical and inferoapical ischemia, depressed post stress EF 44%  1.4 left heart cath 7/9/19-left main 20%, LAD 60 to 70% stenosis, patent LIMA to distal LAD which also has diffuse distal vessel disease, first obtuse marginal branch 100% occluded with a patent saphenous vein graft to the posterior lateral branches of the left circumflex, right coronary artery 100% occluded the graft to this vessel is also occluded however there is collateral filling to the left circumflex system EF 40%, systolic hypertension  1.5 stress test 5/24/2022-moderate size, dense and incompletely reversible defect involving the distal anterior, anteroapical and apical segments compatible with ischemia ± she had infarct, post-rest EF of 40% with septal hypokinesis.  1.6 left heart cath 6/17/2022-circumflex totally occluded in proximal third, however has relatively extensive collaterals to the distal right coronary artery which have progressed significantly since the last catheterization, the LIMA was a small caliber conduit which is patent throughout, vein graft to circumflex is large and diffusely irregular, 70% or greater stenosis  the more proximal from the grafted obtuse margin, this is unchanged from prior, EF 45%, mild to moderate anterolateral hypokinesis, 1+ regurgitation, LVEDP 24-26  2. Ischemic Cardiomyopathy   2.1 Echo 12/7/2020-EF 50%, mild LVH, diastolic function 1, trivial MR, physiological TR, dilated proximal aortic root 4.4 cm  2.2 Echo 5/24/2022-EF 35 to 40%, moderate LVH, diastolic dysfunction 1, physiological TR, trivial MR, mildly dilated proximal aortic root measuring 4.1 to 4.3 cm  3. CHF; class  II-III symptoms   4. Dyslipidemia   5. Orthostatic hypotension  6. COPD  7. History of throat CA  8. Event Monitor 5/1-5/14/17 - SB - NSR with PVC  9. Carotid Artery Disease   9.1 carotid artery ultrasound 12/7/2020-moderate bifurcation disease on the left with less than or equal to 50% stenosis on the right and 50 to 75% stenosis by echo imaging extending into the proximal left internal carotid artery, 16 to 49% stenosis both internal carotid arteries more prominent on the left, antegrade flow both vertebral arteries; potentially hemodynamically significant stenosis on the left  9.2 CTA of the neck 5/13/2021-scattered calcified atherosclerotic change particular involving the internal carotid arteries right greater than left, no definitive flow-limiting stenosis, paranasal sinus disease suggesting mastoiditis  10. Smoking Habituation   11.  Thoracic aortic aneurysm (NO Aneurysm per most recent CT)  11.1 CT of the chest 1/10/2020 -4 cm ascending aortic aneurysm  11.2 CT of the chest 3/10/2021-normal caliber of a sending thoracic aorta, bilateral lower lobe mild bronchial wall thickening  12.  Moderna Vaccination 2/2021; 3/2021; 11/2021    Patient is a 67-year-old male who presents today for follow-up status post left heart cath with his daughter at his side.  He denies any chest pain, pressure, palpitations, fluttering, dizziness, presyncope, syncope, orthopnea, PND or edema.  He said he had little tightness in his chest but it does not happen very often.  He says it is typically on the right side.  He says he does have shortness of breath and this has not changed.  He says he did not take his Florinef yesterday and felt better.  It seems like his blood pressure is actually improving.  He is down to only 2 to 3 cigarettes/day.  Per patient right groin site is unremarkable with the exception of some bruising.    We went over his left heart cath and echo.  We would like to try to get him on Entresto somewhat have him  come back in 8 weeks after he has been off the Florinef and see how his blood pressure is doing.    Current Outpatient Medications on File Prior to Visit   Medication Sig Dispense Refill   • albuterol (PROVENTIL) (2.5 MG/3ML) 0.083% nebulizer solution Take 2.5 mg by nebulization Every 4 (Four) Hours As Needed for Wheezing. 3 mL 12   • atorvastatin (LIPITOR) 80 MG tablet Take 1 tablet by mouth Every Night. 90 tablet 3   • clopidogrel (PLAVIX) 75 MG tablet Take 1 tablet by mouth Daily. 90 tablet 3   • Cyanocobalamin 1000 MCG/ML kit Inject 2 mL as directed Every 30 (Thirty) Days.     • furosemide (LASIX) 40 MG tablet Take 1 tablet by mouth Daily. 90 tablet 3   • isosorbide mononitrate (IMDUR) 30 MG 24 hr tablet Take 0.5 tablets by mouth Every Evening. 90 tablet 3   • magnesium oxide 250 MG tablet TAKE ONE TABLET EVERY DAY 90 tablet 3   • nitroglycerin (NITROSTAT) 0.4 MG SL tablet Place 1 tablet under the tongue Every 5 (Five) Minutes As Needed for Chest Pain. Take no more than 3 doses in 15 minutes. 25 tablet 3   • omeprazole (PrilOSEC) 40 MG capsule Take 1 capsule by mouth Daily. 90 capsule 3   • oxyCODONE-acetaminophen (PERCOCET)  MG per tablet Take 1 tablet by mouth Every 8 (Eight) Hours As Needed.     • potassium chloride (K-DUR,KLOR-CON) 20 MEQ CR tablet Take 1 tablet by mouth 2 times a day 90 tablet 3   • promethazine (PHENERGAN) 25 MG tablet Take 25 mg by mouth Every 6 (Six) Hours As Needed for Nausea or Vomiting.     • [DISCONTINUED] fludrocortisone 0.1 MG tablet Take 1 tablet by mouth Daily. (Patient taking differently: Take 0.1 mg by mouth Every Other Day.) 90 tablet 3   • [DISCONTINUED] Acetylcysteine capsule capsule Take BID the day before cath and once the am of cath. 3 capsule 0     No current facility-administered medications on file prior to visit.       ALLERGIES    Nsaids, Aspirin, Codeine, Ethanolamine, Other, Salicylates, Theophylline, and Triprolidine-pseudoephedrine    Past Medical History:    Diagnosis Date   • CAD (coronary artery disease)    • COPD (chronic obstructive pulmonary disease) (HCC)    • COVID-19 vaccine administered 03/01/2021 03/29/2021 Moderna - Booster ( 11/02/2021) Moderna    • Current every day smoker     1.5-2 PPD   • Dizziness    • Dyslipidemia    • Edema    • History of throat cancer    • Hyperlipidemia    • Hypertension    • Ischemic cardiomyopathy    • Lightheaded    • Myocardial infarction (HCC)    • Orthostatic hypotension    • SOB (shortness of breath)    • Stroke (HCC)        Social History     Socioeconomic History   • Marital status:    Tobacco Use   • Smoking status: Current Every Day Smoker     Packs/day: 2.00     Years: 40.00     Pack years: 80.00     Types: Electronic Cigarette, Cigarettes   • Smokeless tobacco: Never Used   Substance and Sexual Activity   • Alcohol use: Yes     Comment: occasionally   • Drug use: No   • Sexual activity: Defer       Family History   Problem Relation Age of Onset   • Diabetes Mother    • Hypertension Mother    • Hypertension Father    • Hyperlipidemia Sister         Familial hypercholesterolemia   • Hypertension Sister    • Hyperlipidemia Brother         Familial hypercholesterolemia   • Heart attack Brother         Acute MI   • Stroke Brother         CVA   • Hypertension Brother    • Heart disease Brother         pacemaker placement   • Other Brother         Pacemaker placement       Review of Systems   Constitutional: Negative for diaphoresis and fatigue.   HENT: Positive for congestion and hearing loss ([place]). Negative for rhinorrhea and sore throat.    Eyes: Positive for visual disturbance (Glasses daily).   Respiratory: Positive for chest tightness (right side feels tight ) and shortness of breath (Unchanged).    Cardiovascular: Negative for chest pain (Denies CP since last in office ), palpitations and leg swelling.   Gastrointestinal: Positive for vomiting (Once yesterday- thinks BP went up and made him feel sick to his  "stomach ). Negative for abdominal pain, blood in stool, constipation, diarrhea and nausea.   Endocrine: Negative for cold intolerance and heat intolerance.   Genitourinary: Negative for difficulty urinating, enuresis, frequency, hematuria and urgency.   Musculoskeletal: Positive for arthralgias, back pain, gait problem (Ambulates w/ cane ) and neck pain.   Skin: Negative for rash.   Allergic/Immunologic: Positive for environmental allergies. Negative for food allergies.   Neurological: Positive for headaches (Yesterday ). Negative for dizziness, syncope, weakness, light-headedness and numbness.   Hematological: Bruises/bleeds easily (Both).   Psychiatric/Behavioral: Negative for sleep disturbance.       Objective   /89   Pulse 55   Ht 175.3 cm (69\")   Wt 84.6 kg (186 lb 9.6 oz)   SpO2 97%   BMI 27.56 kg/m²   Vitals:    06/24/22 0943   BP: 129/89   Pulse: 55   SpO2: 97%   Weight: 84.6 kg (186 lb 9.6 oz)   Height: 175.3 cm (69\")      Lab Results (most recent)     None        Physical Exam  Constitutional:       General: He is awake.      Appearance: Normal appearance. He is well-developed, well-groomed and overweight.   HENT:      Head: Normocephalic.      Right Ear: Decreased hearing noted.      Left Ear: Decreased hearing noted.   Eyes:      General: Lids are normal.      Comments: Wears glasses    Neck:      Vascular: No carotid bruit, hepatojugular reflux or JVD.   Cardiovascular:      Rate and Rhythm: Regular rhythm. Bradycardia present.      Pulses:           Radial pulses are 2+ on the right side and 2+ on the left side.        Dorsalis pedis pulses are 2+ on the right side and 2+ on the left side.        Posterior tibial pulses are 2+ on the right side and 2+ on the left side.      Heart sounds: Normal heart sounds.   Pulmonary:      Effort: Pulmonary effort is normal.      Breath sounds: Normal air entry. Examination of the right-upper field reveals wheezing. Examination of the left-upper field " reveals wheezing. Examination of the right-lower field reveals decreased breath sounds. Examination of the left-lower field reveals decreased breath sounds. Decreased breath sounds and wheezing (inspiratory ) present.   Abdominal:      General: Bowel sounds are normal.      Palpations: Abdomen is soft.   Musculoskeletal:      Right lower leg: No edema.      Left lower leg: No edema.      Comments: Uses a cane    Neurological:      Mental Status: He is alert and oriented to person, place, and time.   Psychiatric:         Attention and Perception: Attention and perception normal.         Mood and Affect: Mood and affect normal.         Behavior: Behavior normal. Behavior is cooperative.         Thought Content: Thought content normal.         Cognition and Memory: Cognition and memory normal.         Judgment: Judgment normal.      Comments: Forced speech due to history of throat cancer         Procedure   Procedures         Assessment & Plan      Diagnosis Plan   1. Coronary artery disease involving native coronary artery of native heart without angina pectoris     2. Dyslipidemia     3. Grade I diastolic dysfunction  BNP   4. Primary hypertension  Basic Metabolic Panel   5. Ischemic cardiomyopathy  BNP   6. Orthostatic hypotension     7. Bilateral carotid artery stenosis     8. Shortness of breath  BNP   9. Peripheral edema  BNP   10. Current every day smoker     11. Dizziness     12. Palpitations         Return in about 8 weeks (around 8/19/2022).    CAD-patient is on Plavix and statin.  Dyslipidemia-patient is on Lipitor and doing well.  Diastolic dysfunction/peripheral edema/ischemic cardiac myopathy/shortness of breath-patient is currently on frusemide.  He was not on Entresto due to low blood pressure but we are can get him back in in 8 weeks and see if his blood pressure is doing okay now that he is off of Florinef and if he can be put on Entresto.  He will not tolerate beta-blocker due to bradycardia.   Hypertension-patient currently is not on any medication for blood pressure.  Orthostatic hypotension-stable and patient is coming off Florinef.  Carotid artery disease-patient is on Plavix and statin.  Smoking-patient is working on cessation.  Dizziness-stable.  Palpitations-stable.  He will continue his medication regimen with the discontinuation of Florinef which Erin called his pharmacy to advise.  He will follow-up in 8 weeks or sooner if any changes.       Darnell Abarca  reports that he has been smoking electronic cigarette and cigarettes. He has a 80.00 pack-year smoking history. He has never used smokeless tobacco.. I have educated him on the risk of diseases from using tobacco products such as cancer, COPD and heart disease.     I advised him to quit and he is not willing to quit.  Advance Care Planning   ACP discussion was declined by the patient. Patient has an advance directive (not in EMR), copy requested.         Electronically signed by:

## 2022-06-28 ENCOUNTER — TELEPHONE (OUTPATIENT)
Dept: CARDIOLOGY | Facility: CLINIC | Age: 67
End: 2022-06-28

## 2022-06-28 NOTE — TELEPHONE ENCOUNTER
I called and spoke with patient sister who is listed on [facility] . I advised that he will need to get his lab work drawn.    Gabrielle CUNNINGHAMA

## 2022-06-29 ENCOUNTER — TELEPHONE (OUTPATIENT)
Dept: CARDIOLOGY | Facility: CLINIC | Age: 67
End: 2022-06-29

## 2022-06-29 DIAGNOSIS — I25.10 CORONARY ARTERY DISEASE INVOLVING NATIVE CORONARY ARTERY OF NATIVE HEART WITHOUT ANGINA PECTORIS: Primary | ICD-10-CM

## 2022-06-29 DIAGNOSIS — E78.5 DYSLIPIDEMIA: ICD-10-CM

## 2022-06-29 DIAGNOSIS — I51.89 GRADE I DIASTOLIC DYSFUNCTION: ICD-10-CM

## 2022-06-29 NOTE — TELEPHONE ENCOUNTER
Gave order for patient to have BMP at [facility]. Spoke with Sierra.    NAVEED MCCOY MA

## 2022-06-29 NOTE — TELEPHONE ENCOUNTER
----- Message from EARLINE Armenta sent at 6/29/2022  1:53 PM EDT -----  Patient's BNP is still elevated. Is he taking his lasix daily?  How is his shortness of breath?  Does he have ability to weigh self?  Cr is up as well.             Left mess for pt to return call regarding the above mess/lab results. Once pt returns call we need to know how pt is taking Lasix ? And how is his SOB ? And can he Weigh himself in the am .   Labs scanned into chart   '    CR - 1.90    ANNETTA Duarte

## 2022-06-29 NOTE — TELEPHONE ENCOUNTER
Spoke with patient he states,he does take his lasix Q day. SOB has not gotten any worst, still worsens with any exertion. He told me he currently weighs, 184. Home health comes out once a week, and weight has not changed.    He says he started back on Fludrocortisone, due to BP running as low as 85/55, since starting back ranging 111/79 hr 57

## 2022-07-07 ENCOUNTER — TELEPHONE (OUTPATIENT)
Dept: CARDIOLOGY | Facility: CLINIC | Age: 67
End: 2022-07-07

## 2022-07-07 DIAGNOSIS — R79.89 CREATININE ELEVATION: Primary | ICD-10-CM

## 2022-07-07 RX ORDER — FUROSEMIDE 20 MG/1
20 TABLET ORAL DAILY
Qty: 30 TABLET | Refills: 11 | Status: SHIPPED | OUTPATIENT
Start: 2022-07-07 | End: 2022-09-01 | Stop reason: SDUPTHER

## 2022-07-07 NOTE — TELEPHONE ENCOUNTER
----- Message from EARLINE Armenta sent at 7/7/2022  7:26 AM EDT -----  Cr is more elevated.  Is he staying hydrated?  How is his shortness of breath, cough?  Have him go back down to Lasix 20 mg PO daily and KCL 20 meq PO daily.  Repeat a BMP in 1 week.  [facility]  Thanks.

## 2022-07-07 NOTE — TELEPHONE ENCOUNTER
(Entered orders, per Tess)    CREAT 2.00 normal range is 0.66-1.25        First attempt to reach pt. A woman answered and stated that pt was currently unavailable. I asked that she have him call me back, she stated they struggle to reach our office. I told her that I would try to call back in a few hours if I hadn't heard from them. She verbalized understanding.

## 2022-07-07 NOTE — TELEPHONE ENCOUNTER
Called pt, informed him of results and the message from Tess.   · Drinks maybe 1.5 bottles, I advised pt that he needs to increase him water intake, especially w/ the heat.   · SoB and cough are unchanged. Cough is worst in the am.   · Explained med changes and labs in 1 week. Faxed order to [facility] at [phone].

## 2022-07-14 ENCOUNTER — TELEPHONE (OUTPATIENT)
Dept: CARDIOLOGY | Facility: CLINIC | Age: 67
End: 2022-07-14

## 2022-07-14 NOTE — TELEPHONE ENCOUNTER
----- Message from EARLINE Armenta sent at 7/14/2022  6:16 AM EDT -----  Back down to baseline on Cr.  How is his swelling and breathing?  No additional changes at this time.  See if he does daily weights or has the ability to.  If he does have him do daily weights, If he gains 2 lbs increase his lasix and KCL for that day only to 40 of Lasix and 40 of KCL.  Then resume normal dose the next day, however, he wants to do this anytime he gains 2 lbs from weight day to the next.    For weighing...when he first gets up, pee and then weigh, do it this way every AM.         Called and spoke with Nory regarding the above mess/labs . Pt's sister will have pt to call us back regarding the above mess/labs    Once pt returns call we need to ask if he has a way to get his weight first thing in the am after he pee's . If so and pt gains 2lbs over night then he will need to take Lasix 40 mg and Potassium 40 meq , the day he has gained 2 lbs he will go back to normal dose the next day . He can do this dose each time he has a 2 lbs weight gain overnight . Also we need to know how pt is feeling ( SOB) swelling is ? We also need to ask pt if he needs extra Lasix and Potassium called in or does he have enough if he needs to increase the dose ?    Labs - CR - 1.60  Potassium ( Normal) and Sodium Normal     ANNETTA Duarte      Second attempt to reach pt. Left a voicemail for pt to return my call at [phone].  07/19/2022 - 4:24 pm left mess for pt to return call

## 2022-07-22 NOTE — TELEPHONE ENCOUNTER
Tried to contact patient back after missed call on triage, no answer. I spoke with sister, and advised to have him to return call.

## 2022-09-01 ENCOUNTER — OFFICE VISIT (OUTPATIENT)
Dept: CARDIOLOGY | Facility: CLINIC | Age: 67
End: 2022-09-01

## 2022-09-01 VITALS
SYSTOLIC BLOOD PRESSURE: 125 MMHG | TEMPERATURE: 97.1 F | DIASTOLIC BLOOD PRESSURE: 74 MMHG | HEIGHT: 69 IN | BODY MASS INDEX: 28.2 KG/M2 | HEART RATE: 61 BPM | WEIGHT: 190.4 LBS | OXYGEN SATURATION: 98 %

## 2022-09-01 DIAGNOSIS — E87.6 HYPOKALEMIA: ICD-10-CM

## 2022-09-01 DIAGNOSIS — J43.9 PULMONARY EMPHYSEMA, UNSPECIFIED EMPHYSEMA TYPE: ICD-10-CM

## 2022-09-01 DIAGNOSIS — I25.10 CORONARY ARTERY DISEASE INVOLVING NATIVE CORONARY ARTERY OF NATIVE HEART WITHOUT ANGINA PECTORIS: Primary | Chronic | ICD-10-CM

## 2022-09-01 DIAGNOSIS — I25.5 ISCHEMIC CARDIOMYOPATHY: Chronic | ICD-10-CM

## 2022-09-01 DIAGNOSIS — I65.23 BILATERAL CAROTID ARTERY STENOSIS: ICD-10-CM

## 2022-09-01 DIAGNOSIS — E78.5 HYPERLIPIDEMIA, UNSPECIFIED HYPERLIPIDEMIA TYPE: ICD-10-CM

## 2022-09-01 DIAGNOSIS — F17.200 CURRENT EVERY DAY SMOKER: Chronic | ICD-10-CM

## 2022-09-01 DIAGNOSIS — I95.1 ORTHOSTATIC HYPOTENSION: Chronic | ICD-10-CM

## 2022-09-01 DIAGNOSIS — R60.9 PERIPHERAL EDEMA: ICD-10-CM

## 2022-09-01 DIAGNOSIS — I51.89 GRADE I DIASTOLIC DYSFUNCTION: ICD-10-CM

## 2022-09-01 DIAGNOSIS — J30.2 SEASONAL ALLERGIES: ICD-10-CM

## 2022-09-01 DIAGNOSIS — E83.42 HYPOMAGNESEMIA: ICD-10-CM

## 2022-09-01 DIAGNOSIS — R06.02 SHORTNESS OF BREATH: ICD-10-CM

## 2022-09-01 DIAGNOSIS — I10 PRIMARY HYPERTENSION: Chronic | ICD-10-CM

## 2022-09-01 PROCEDURE — 99214 OFFICE O/P EST MOD 30 MIN: CPT | Performed by: NURSE PRACTITIONER

## 2022-09-01 RX ORDER — FUROSEMIDE 20 MG/1
20 TABLET ORAL DAILY
Qty: 90 TABLET | Refills: 3 | Status: SHIPPED | OUTPATIENT
Start: 2022-09-01

## 2022-09-01 RX ORDER — LORATADINE 10 MG/1
10 TABLET ORAL DAILY
Qty: 90 TABLET | Refills: 3 | Status: SHIPPED | OUTPATIENT
Start: 2022-09-01

## 2022-09-01 RX ORDER — POTASSIUM CHLORIDE 20 MEQ/1
20 TABLET, EXTENDED RELEASE ORAL DAILY
Qty: 90 TABLET | Refills: 3 | Status: SHIPPED | OUTPATIENT
Start: 2022-09-01

## 2022-09-01 RX ORDER — ATORVASTATIN CALCIUM 80 MG/1
80 TABLET, FILM COATED ORAL NIGHTLY
Qty: 90 TABLET | Refills: 3 | Status: SHIPPED | OUTPATIENT
Start: 2022-09-01

## 2022-09-01 RX ORDER — CLOPIDOGREL BISULFATE 75 MG/1
75 TABLET ORAL DAILY
Qty: 90 TABLET | Refills: 3 | Status: SHIPPED | OUTPATIENT
Start: 2022-09-01

## 2022-09-01 RX ORDER — ALBUTEROL SULFATE 2.5 MG/3ML
2.5 SOLUTION RESPIRATORY (INHALATION) EVERY 4 HOURS PRN
Qty: 3 ML | Refills: 12 | Status: SHIPPED | OUTPATIENT
Start: 2022-09-01

## 2022-09-01 RX ORDER — FLUDROCORTISONE ACETATE 0.1 MG/1
0.1 TABLET ORAL DAILY
COMMUNITY
End: 2022-09-01 | Stop reason: SDUPTHER

## 2022-09-01 RX ORDER — FLUDROCORTISONE ACETATE 0.1 MG/1
0.1 TABLET ORAL DAILY
Qty: 90 TABLET | Refills: 3 | Status: SHIPPED | OUTPATIENT
Start: 2022-09-01

## 2022-09-01 NOTE — PROGRESS NOTES
Subjective   Darnell Abarca is a 67 y.o. male     Chief Complaint   Patient presents with   • Coronary Artery Disease   • Congestive Heart Failure   • Hyperlipidemia       HPI    Problem List:     1. CAD  1.1 CABG x 3 @ [facility] 2003  1.2 [facility] 6/2015 - patient grafts with disease in the native vessels with medical management recommendation; EF 40%  1.3 stress test 12/10/18-apical and inferoapical ischemia, depressed post stress EF 44%  1.4 left heart cath 7/9/19-left main 20%, LAD 60 to 70% stenosis, patent LIMA to distal LAD which also has diffuse distal vessel disease, first obtuse marginal branch 100% occluded with a patent saphenous vein graft to the posterior lateral branches of the left circumflex, right coronary artery 100% occluded the graft to this vessel is also occluded however there is collateral filling to the left circumflex system EF 40%, systolic hypertension  1.5 stress test 5/24/2022-moderate size, dense and incompletely reversible defect involving the distal anterior, anteroapical and apical segments compatible with ischemia ± she had infarct, post-rest EF of 40% with septal hypokinesis.  1.6 left heart cath 6/17/2022-circumflex totally occluded in proximal third, however has relatively extensive collaterals to the distal right coronary artery which have progressed significantly since the last catheterization, the LIMA was a small caliber conduit which is patent throughout, vein graft to circumflex is large and diffusely irregular, 70% or greater stenosis  the more proximal from the grafted obtuse margin, this is unchanged from prior, EF 45%, mild to moderate anterolateral hypokinesis, 1+ regurgitation, LVEDP 24-26  2. Ischemic Cardiomyopathy   2.1 Echo 12/7/2020-EF 50%, mild LVH, diastolic function 1, trivial MR, physiological TR, dilated proximal aortic root 4.4 cm  2.2 Echo 5/24/2022-EF 35 to 40%, moderate LVH, diastolic dysfunction 1, physiological TR, trivial MR, mildly dilated proximal  aortic root measuring 4.1 to 4.3 cm  3. CHF; class II-III symptoms   4. Dyslipidemia   5. Orthostatic hypotension  6. COPD  7. History of throat CA  8. Event Monitor 5/1-5/14/17 - SB - NSR with PVC  9. Carotid Artery Disease   9.1 carotid artery ultrasound 12/7/2020-moderate bifurcation disease on the left with less than or equal to 50% stenosis on the right and 50 to 75% stenosis by echo imaging extending into the proximal left internal carotid artery, 16 to 49% stenosis both internal carotid arteries more prominent on the left, antegrade flow both vertebral arteries; potentially hemodynamically significant stenosis on the left  9.2 CTA of the neck 5/13/2021-scattered calcified atherosclerotic change particular involving the internal carotid arteries right greater than left, no definitive flow-limiting stenosis, paranasal sinus disease suggesting mastoiditis  10. Smoking Habituation   11.  Thoracic aortic aneurysm (NO Aneurysm per most recent CT)  11.1 CT of the chest 1/10/2020 -4 cm ascending aortic aneurysm  11.2 CT of the chest 3/10/2021-normal caliber of a sending thoracic aorta, bilateral lower lobe mild bronchial wall thickening  12.  Moderna Vaccination 2/2021; 3/2021; 11/2021    Patient is a 67-year-old male who presents today for a follow-up.  He denies any chest pain, pressure, palpitations, fluttering, presyncope, syncope, orthopnea, PND or edema.  He says he has had improvement in his dizziness since stopping the Imdur 2 weeks ago.  He has continue the Florinef once a day.  Therefore we will just leave him on it because he feels like he does better on it.  He says he does have shortness of breath if he walks too far but has been trying to get up and keep moving.  He says he does get fatigued at times.    Current Outpatient Medications on File Prior to Visit   Medication Sig Dispense Refill   • Cyanocobalamin 1000 MCG/ML kit Inject 2 mL as directed Every 30 (Thirty) Days.     • nitroglycerin (NITROSTAT)  0.4 MG SL tablet Place 1 tablet under the tongue Every 5 (Five) Minutes As Needed for Chest Pain. Take no more than 3 doses in 15 minutes. 25 tablet 3   • oxyCODONE-acetaminophen (PERCOCET)  MG per tablet Take 1 tablet by mouth Every 8 (Eight) Hours As Needed for Moderate Pain.     • promethazine (PHENERGAN) 25 MG tablet Take 25 mg by mouth Every 6 (Six) Hours As Needed for Nausea or Vomiting.       No current facility-administered medications on file prior to visit.       ALLERGIES    Nsaids, Aspirin, Codeine, Ethanolamine, Other, Salicylates, Theophylline, and Triprolidine-pseudoephedrine    Past Medical History:   Diagnosis Date   • CAD (coronary artery disease)    • COPD (chronic obstructive pulmonary disease) ([facility])    • COVID-19 vaccine administered 03/01/2021 03/29/2021 Moderna - Booster ( 11/02/2021) Moderna    • Current every day smoker     1.5-2 PPD   • Dizziness    • Dyslipidemia    • Edema    • History of throat cancer    • Hyperlipidemia    • Hypertension    • Ischemic cardiomyopathy    • Lightheaded    • Myocardial infarction (HCC)    • Orthostatic hypotension    • SOB (shortness of breath)    • Stroke (HCC)        Social History     Socioeconomic History   • Marital status:    Tobacco Use   • Smoking status: Every Day     Packs/day: 2.00     Years: 40.00     Pack years: 80.00     Types: Electronic Cigarette, Cigarettes   • Smokeless tobacco: Current     Types: Chew   Vaping Use   • Vaping Use: Never used   Substance and Sexual Activity   • Alcohol use: Yes     Comment: occasionally   • Drug use: No   • Sexual activity: Defer       Family History   Problem Relation Age of Onset   • Diabetes Mother    • Hypertension Mother    • Hypertension Father    • Hyperlipidemia Sister         Familial hypercholesterolemia   • Hypertension Sister    • Hyperlipidemia Brother         Familial hypercholesterolemia   • Heart attack Brother         Acute MI   • Stroke Brother         CVA   • Hypertension  "Brother    • Heart disease Brother         pacemaker placement   • Other Brother         Pacemaker placement       Review of Systems   Constitutional: Positive for fatigue (some times ). Negative for diaphoresis.   HENT: Negative for congestion, rhinorrhea and sneezing.    Eyes: Positive for visual disturbance (wears glasses ).   Respiratory: Positive for shortness of breath (when he exerts himself more than normal. if he walks so far). Negative for chest tightness.    Cardiovascular: Negative for chest pain, palpitations and leg swelling.   Gastrointestinal: Negative for constipation, diarrhea, nausea and vomiting.   Endocrine: Negative for cold intolerance and heat intolerance.   Genitourinary: Negative for difficulty urinating.   Musculoskeletal: Positive for back pain, gait problem (uses a cane ) and neck pain. Negative for arthralgias.   Skin: Negative.    Allergic/Immunologic: Positive for environmental allergies. Negative for food allergies.   Neurological: Positive for dizziness (better since stopping imdur) and numbness (in right leg ). Negative for syncope and light-headedness.   Hematological: Bruises/bleeds easily.   Psychiatric/Behavioral: Positive for sleep disturbance (sometimes has a hard time).       Objective   /74 (BP Location: Left arm, Patient Position: Sitting)   Pulse 61   Temp 97.1 °F (36.2 °C)   Ht 175.3 cm (69\")   Wt 86.4 kg (190 lb 6.4 oz)   SpO2 98%   BMI 28.12 kg/m²   Vitals:    09/01/22 1304   BP: 125/74   BP Location: Left arm   Patient Position: Sitting   Pulse: 61   Temp: 97.1 °F (36.2 °C)   SpO2: 98%   Weight: 86.4 kg (190 lb 6.4 oz)   Height: 175.3 cm (69\")      Lab Results (most recent)     None        Physical Exam  Vitals reviewed.   Constitutional:       General: He is awake.      Appearance: Normal appearance. He is well-developed, well-groomed and overweight.   HENT:      Head: Normocephalic.      Right Ear: Decreased hearing noted.      Left Ear: Decreased " hearing noted.   Eyes:      General: Lids are normal.      Comments: Wears glasses    Neck:      Vascular: No carotid bruit, hepatojugular reflux or JVD.   Cardiovascular:      Rate and Rhythm: Normal rate and regular rhythm.      Pulses:           Radial pulses are 2+ on the right side and 2+ on the left side.        Dorsalis pedis pulses are 2+ on the right side and 2+ on the left side.        Posterior tibial pulses are 2+ on the right side and 2+ on the left side.      Heart sounds: Normal heart sounds.   Pulmonary:      Effort: Pulmonary effort is normal.      Breath sounds: Normal air entry. Examination of the right-upper field reveals wheezing. Examination of the left-upper field reveals wheezing. Examination of the right-middle field reveals wheezing. Examination of the left-middle field reveals wheezing. Examination of the right-lower field reveals wheezing. Examination of the left-lower field reveals wheezing. Wheezing (inspiratory ) present.      Comments: Coarse throughout  Abdominal:      General: Bowel sounds are normal.      Palpations: Abdomen is soft.   Musculoskeletal:      Right lower leg: No edema.      Left lower leg: No edema.      Comments: Uses a cane   Skin:     General: Skin is warm and dry.   Neurological:      Mental Status: He is alert and oriented to person, place, and time.   Psychiatric:         Attention and Perception: Attention and perception normal.         Mood and Affect: Mood and affect normal.         Behavior: Behavior is cooperative.         Thought Content: Thought content normal.         Cognition and Memory: Cognition and memory normal.         Judgment: Judgment normal.      Comments: Pressured speech          Procedure   Procedures         Assessment & Plan      Diagnosis Plan   1. Coronary artery disease involving native coronary artery of native heart without angina pectoris  clopidogrel (PLAVIX) 75 MG tablet      2. Primary hypertension        3. Ischemic  cardiomyopathy  furosemide (LASIX) 20 MG tablet      4. Orthostatic hypotension  fludrocortisone 0.1 MG tablet      5. Grade I diastolic dysfunction  furosemide (LASIX) 20 MG tablet      6. Bilateral carotid artery stenosis        7. Shortness of breath  albuterol (PROVENTIL) (2.5 MG/3ML) 0.083% nebulizer solution      8. Peripheral edema        9. Current every day smoker        10. Hypomagnesemia  magnesium oxide 250 MG tablet      11. Hyperlipidemia, unspecified hyperlipidemia type  atorvastatin (LIPITOR) 80 MG tablet      12. Hypokalemia  potassium chloride (K-DUR,KLOR-CON) 20 MEQ CR tablet      13. Pulmonary emphysema, unspecified emphysema type (HCC)  albuterol (PROVENTIL) (2.5 MG/3ML) 0.083% nebulizer solution      14. Seasonal allergies  loratadine (Claritin) 10 MG tablet          Return in about 3 months (around 12/1/2022).  CAD-patient is on Plavix and statin.  Hypertension-patient is currently not on any medication for blood pressure due to orthostatic hypotension.  Ischemic cardiomyopathy/diastolic dysfunction/peripheral edema-patient takes Lasix.  Orthostatic hypotension-patient went back on Florinef as he says he did much better on it.  Carotid artery disease-patient is on Plavix and statin.  Shortness of breath-patient uses albuterol as needed.  Smoking-encourage cessation.  Hypomagnesia-refill magnesium.  Hyperlipidemia-patient is on Lipitor and doing well.  Hypokalemia-refill patient's potassium.  Pulmonary emphysema-refill patient's albuterol inhaler for him.  Seasonal allergies-sent in Teliris.  Patient will continue his medication regimen with the exception of discontinuing his Imdur.  He will follow-up in 3 months or sooner if any changes.         Darnell Abacra  reports that he has been smoking electronic cigarette and cigarettes. He has a 80.00 pack-year smoking history. His smokeless tobacco use includes chew.. I have educated him on the risk of diseases from using tobacco products such as  cancer, COPD and heart disease.     I advised him to quit and he is not willing to quit.    I spent 3  minutes counseling the patient.     Advance Care Planning   ACP discussion was held with the patient during this visit. Patient does not have an advance directive, information provided.     Patient brought in medicine list to appointment, it's been reviewed with patient and med list was updated in the chart.       Electronically signed by:

## 2022-11-26 DIAGNOSIS — K21.9 GASTROESOPHAGEAL REFLUX DISEASE, UNSPECIFIED WHETHER ESOPHAGITIS PRESENT: ICD-10-CM

## 2022-11-28 RX ORDER — OMEPRAZOLE 40 MG/1
CAPSULE, DELAYED RELEASE ORAL
Qty: 90 CAPSULE | Refills: 3 | Status: SHIPPED | OUTPATIENT
Start: 2022-11-28

## 2022-12-08 ENCOUNTER — OFFICE VISIT (OUTPATIENT)
Dept: CARDIOLOGY | Facility: CLINIC | Age: 67
End: 2022-12-08

## 2022-12-08 ENCOUNTER — TELEPHONE (OUTPATIENT)
Dept: CARDIOLOGY | Facility: CLINIC | Age: 67
End: 2022-12-08

## 2022-12-08 VITALS
DIASTOLIC BLOOD PRESSURE: 80 MMHG | WEIGHT: 192 LBS | TEMPERATURE: 97.2 F | HEART RATE: 64 BPM | BODY MASS INDEX: 28.44 KG/M2 | OXYGEN SATURATION: 99 % | SYSTOLIC BLOOD PRESSURE: 140 MMHG | HEIGHT: 69 IN

## 2022-12-08 DIAGNOSIS — R06.02 SHORTNESS OF BREATH: ICD-10-CM

## 2022-12-08 DIAGNOSIS — F17.200 CURRENT EVERY DAY SMOKER: Chronic | ICD-10-CM

## 2022-12-08 DIAGNOSIS — R60.9 PERIPHERAL EDEMA: ICD-10-CM

## 2022-12-08 DIAGNOSIS — I95.1 ORTHOSTATIC HYPOTENSION: Chronic | ICD-10-CM

## 2022-12-08 DIAGNOSIS — I51.89 GRADE I DIASTOLIC DYSFUNCTION: ICD-10-CM

## 2022-12-08 DIAGNOSIS — I25.5 ISCHEMIC CARDIOMYOPATHY: Chronic | ICD-10-CM

## 2022-12-08 DIAGNOSIS — I10 PRIMARY HYPERTENSION: Chronic | ICD-10-CM

## 2022-12-08 DIAGNOSIS — R00.2 PALPITATIONS: ICD-10-CM

## 2022-12-08 DIAGNOSIS — I25.10 CORONARY ARTERY DISEASE INVOLVING NATIVE CORONARY ARTERY OF NATIVE HEART WITHOUT ANGINA PECTORIS: Primary | Chronic | ICD-10-CM

## 2022-12-08 DIAGNOSIS — I65.23 BILATERAL CAROTID ARTERY STENOSIS: ICD-10-CM

## 2022-12-08 PROCEDURE — 99214 OFFICE O/P EST MOD 30 MIN: CPT | Performed by: NURSE PRACTITIONER
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
## 2022-12-08 NOTE — TELEPHONE ENCOUNTER
Called and spoke with [facility].  Advised to JASWINDER/C Entresto.  She gave verbal understanding and will not fill it.

## 2023-06-02 DIAGNOSIS — E83.42 HYPOMAGNESEMIA: ICD-10-CM

## 2023-06-07 ENCOUNTER — TELEPHONE (OUTPATIENT)
Dept: CARDIOLOGY | Facility: OTHER | Age: 68
End: 2023-06-07

## 2023-06-07 NOTE — TELEPHONE ENCOUNTER
Caller: ILYA STANTON    Relationship to patient: NEIGHBOR    Best call back number: [phone]    Patient is needing: PATIENT CAN'T KEEP 06.14.2023 APPOINTMENT. CANNOT SCHEDULE UNTIL DECEMBER. ILYA ROMY WANTS TO GIVE HIM HER [facility].20.2023 APPOINTMENT [facility] AND SHE TAKE THE DECEMBER. PLEASE ADVISE THE PATIENT

## 2023-06-09 DIAGNOSIS — E87.6 HYPOKALEMIA: ICD-10-CM

## 2023-06-09 RX ORDER — POTASSIUM CHLORIDE 20 MEQ/1
TABLET, EXTENDED RELEASE ORAL
Qty: 90 TABLET | Refills: 3 | Status: SHIPPED | OUTPATIENT
Start: 2023-06-09

## 2023-06-20 LAB — MAGNESIUM SERPL-MCNC: 1.7 MG/DL (ref 1.6–2.4)

## 2023-08-28 DIAGNOSIS — I95.1 ORTHOSTATIC HYPOTENSION: Chronic | ICD-10-CM

## 2023-08-28 RX ORDER — FLUDROCORTISONE ACETATE 0.1 MG/1
TABLET ORAL
Qty: 90 TABLET | Refills: 3 | Status: SHIPPED | OUTPATIENT
Start: 2023-08-28

## 2023-09-05 DIAGNOSIS — I25.10 CORONARY ARTERY DISEASE INVOLVING NATIVE CORONARY ARTERY OF NATIVE HEART WITHOUT ANGINA PECTORIS: Chronic | ICD-10-CM

## 2023-09-05 DIAGNOSIS — J30.2 SEASONAL ALLERGIES: ICD-10-CM

## 2023-09-05 DIAGNOSIS — E78.5 HYPERLIPIDEMIA, UNSPECIFIED HYPERLIPIDEMIA TYPE: ICD-10-CM

## 2023-09-06 RX ORDER — CLOPIDOGREL BISULFATE 75 MG/1
TABLET ORAL
Qty: 90 TABLET | Refills: 3 | Status: SHIPPED | OUTPATIENT
Start: 2023-09-06

## 2023-09-06 RX ORDER — ATORVASTATIN CALCIUM 80 MG/1
TABLET, FILM COATED ORAL
Qty: 90 TABLET | Refills: 3 | Status: SHIPPED | OUTPATIENT
Start: 2023-09-06

## 2023-09-06 RX ORDER — LORATADINE 10 MG/1
TABLET ORAL
Qty: 90 TABLET | Refills: 3 | Status: SHIPPED | OUTPATIENT
Start: 2023-09-06

## 2023-12-04 DIAGNOSIS — E83.42 HYPOMAGNESEMIA: ICD-10-CM

## 2024-01-22 ENCOUNTER — TELEPHONE (OUTPATIENT)
Dept: CARDIOLOGY | Facility: CLINIC | Age: 69
End: 2024-01-22

## 2024-01-22 NOTE — TELEPHONE ENCOUNTER
Caller: ILYA STANTON    Relationship to patient: Friend    Best call back number: [phone]    Chief complaint: FOLLOW UP    Type of visit: FOLLOW UP    Requested date: ASAP-ANY DAY EXCEPT FEBRUARY 6TH      If rescheduling, when is the original appointment: 1.22.24     Additional notes:PATIENT UNABLE TO COME TO APPOINTMENT TODAY DUE TO WEATHER AND NOT HAVING WATER OVER THE WEEKEND. NEEDS TO BE SCHEDULED BACK TO BACK WITH NEIGHBOR, ILYA STANTON.

## 2024-02-09 DIAGNOSIS — E83.42 HYPOMAGNESEMIA: ICD-10-CM

## 2024-03-14 ENCOUNTER — OFFICE VISIT (OUTPATIENT)
Dept: CARDIOLOGY | Facility: CLINIC | Age: 69
End: 2024-03-14
Payer: MEDICARE

## 2024-03-14 VITALS
HEART RATE: 69 BPM | HEIGHT: 69 IN | OXYGEN SATURATION: 96 % | BODY MASS INDEX: 26.66 KG/M2 | SYSTOLIC BLOOD PRESSURE: 119 MMHG | DIASTOLIC BLOOD PRESSURE: 77 MMHG | WEIGHT: 180 LBS

## 2024-03-14 DIAGNOSIS — I25.10 CORONARY ARTERY DISEASE INVOLVING NATIVE CORONARY ARTERY OF NATIVE HEART WITHOUT ANGINA PECTORIS: Primary | ICD-10-CM

## 2024-03-14 DIAGNOSIS — I50.22 CHRONIC SYSTOLIC HEART FAILURE: ICD-10-CM

## 2024-03-14 DIAGNOSIS — I25.5 ISCHEMIC CARDIOMYOPATHY: ICD-10-CM

## 2024-03-14 PROCEDURE — 3078F DIAST BP <80 MM HG: CPT | Performed by: PHYSICIAN ASSISTANT

## 2024-03-14 PROCEDURE — 3074F SYST BP LT 130 MM HG: CPT | Performed by: PHYSICIAN ASSISTANT

## 2024-03-14 PROCEDURE — 93000 ELECTROCARDIOGRAM COMPLETE: CPT | Performed by: PHYSICIAN ASSISTANT

## 2024-03-14 PROCEDURE — 99214 OFFICE O/P EST MOD 30 MIN: CPT | Performed by: PHYSICIAN ASSISTANT

## 2024-03-14 NOTE — LETTER
March 14, 2024       No Recipients    Patient: Darnell Abarca   YOB: 1955   Date of Visit: 3/14/2024       Dear EARLINE Hogan    Darnell Abarca was in my office today. Below is a copy of my note.    If you have questions, please do not hesitate to call me. I look forward to following Darnell along with you.         Sincerely,        MIGEL Fish        CC:   No Recipients    Problem list     Subjective  Darnell Abarca is a 69 y.o. male     Chief Complaint   Patient presents with   • Follow-up     Problem List:     1. CAD  1.1 CABG x 3 @ [facility] 2003  1.2 [facility] 6/2015 - patient grafts with disease in the native vessels with medical management recommendation; EF 40%  1.3 stress test 12/10/18-apical and inferoapical ischemia, depressed post stress EF 44%  1.4 left heart cath 7/9/19-left main 20%, LAD 60 to 70% stenosis, patent LIMA to distal LAD which also has diffuse distal vessel disease, first obtuse marginal branch 100% occluded with a patent saphenous vein graft to the posterior lateral branches of the left circumflex, right coronary artery 100% occluded the graft to this vessel is also occluded however there is collateral filling to the left circumflex system EF 40%, systolic hypertension  1.5 stress test 5/24/2022-moderate size, dense and incompletely reversible defect involving the distal anterior, anteroapical and apical segments compatible with ischemia ± she had infarct, post-rest EF of 40% with septal hypokinesis.  1.6 left heart cath 6/17/2022-circumflex totally occluded in proximal third, however has relatively extensive collaterals to the distal right coronary artery which have progressed significantly since the last catheterization, the LIMA was a small caliber conduit which is patent throughout, vein graft to circumflex is large and diffusely irregular, 70% or greater stenosis  the more proximal from the grafted obtuse margin, this is unchanged from prior, EF 45%, mild to  moderate anterolateral hypokinesis, 1+ regurgitation, LVEDP 24-26  2. Ischemic Cardiomyopathy   2.1 Echo 12/7/2020-EF 50%, mild LVH, diastolic function 1, trivial MR, physiological TR, dilated proximal aortic root 4.4 cm  2.2 Echo 5/24/2022-EF 35 to 40%, moderate LVH, diastolic dysfunction 1, physiological TR, trivial MR, mildly dilated proximal aortic root measuring 4.1 to 4.3 cm  3. CHF; class II-III symptoms   4. Dyslipidemia   5. Orthostatic hypotension  6. COPD  7. History of throat CA  8. Event Monitor 5/1-5/14/17 - SB - NSR with PVC  9. Carotid Artery Disease   9.1 carotid artery ultrasound 12/7/2020-moderate bifurcation disease on the left with less than or equal to 50% stenosis on the right and 50 to 75% stenosis by echo imaging extending into the proximal left internal carotid artery, 16 to 49% stenosis both internal carotid arteries more prominent on the left, antegrade flow both vertebral arteries; potentially hemodynamically significant stenosis on the left  9.2 CTA of the neck 5/13/2021-scattered calcified atherosclerotic change particular involving the internal carotid arteries right greater than left, no definitive flow-limiting stenosis, paranasal sinus disease suggesting mastoiditis  10. Smoking Habituation   11.  Thoracic aortic aneurysm (NO Aneurysm per most recent CT)  11.1 CT of the chest 1/10/2020 -4 cm ascending aortic aneurysm  11.2 CT of the chest 3/10/2021-normal caliber of a sending thoracic aorta, bilateral lower lobe mild bronchial wall thickening  12.  Chronic renal failure  HPI    Patient is a 69-year-old male who presents back to the office for routine follow-up.  As above, has history of coronary disease and ischemic cardiomyopathy.    He feels well.  He has no chest pain.  His dyspnea is mild.  He does not describe any progressive shortness of breath.  No complaints of PND or orthopnea.    He does not describe palpitations nor does he complain of dysrhythmic symptoms.  He is stable  otherwise.      Current Outpatient Medications on File Prior to Visit   Medication Sig Dispense Refill   • albuterol (PROVENTIL) (2.5 MG/3ML) 0.083% nebulizer solution Take 2.5 mg by nebulization Every 4 (Four) Hours As Needed for Wheezing. 3 mL 12   • atorvastatin (LIPITOR) 80 MG tablet TAKE ONE TABLET BY MOUTH EVERY NIGHT 90 tablet 3   • clopidogrel (PLAVIX) 75 MG tablet TAKE ONE TABLET BY MOUTH DAILY 90 tablet 3   • Cyanocobalamin 1000 MCG/ML kit Inject 2 mL as directed Every 30 (Thirty) Days.     • fludrocortisone 0.1 MG tablet TAKE ONE TABLET EVERY DAY 90 tablet 3   • furosemide (LASIX) 20 MG tablet Take 1 tablet by mouth Daily. 90 tablet 3   • loratadine (CLARITIN) 10 MG tablet TAKE ONE TABLET EVERY DAY 90 tablet 3   • magnesium oxide 250 MG tablet TAKE ONE TABLET BY MOUTH DAILY 30 tablet 0   • nitroglycerin (NITROSTAT) 0.4 MG SL tablet Place 1 tablet under the tongue Every 5 (Five) Minutes As Needed for Chest Pain. Take no more than 3 doses in 15 minutes. 25 tablet 3   • omeprazole (priLOSEC) 40 MG capsule TAKE ONE CAPSULE BY MOUTH EVERY DAY 90 capsule 3   • oxyCODONE-acetaminophen (PERCOCET)  MG per tablet Take 1 tablet by mouth Every 8 (Eight) Hours As Needed for Moderate Pain.     • potassium chloride (K-DUR,KLOR-CON) 20 MEQ CR tablet TAKE ONE TABLET BY MOUTH TWICE DAILY 90 tablet 3   • promethazine (PHENERGAN) 25 MG tablet Take 1 tablet by mouth Every 6 (Six) Hours As Needed for Nausea or Vomiting.       No current facility-administered medications on file prior to visit.       Nsaids, Aspirin, Codeine, Ethanolamine, Other, Salicylates, Theophylline, and Triprolidine-pseudoephedrine    Past Medical History:   Diagnosis Date   • CAD (coronary artery disease)    • COPD (chronic obstructive pulmonary disease)    • COVID-19 vaccine administered 03/01/2021 03/29/2021 Moderna - Booster ( 11/02/2021) Moderna    • Current every day smoker     1.5-2 PPD   • Dizziness    • Dyslipidemia    • Edema    •  History of throat cancer    • Hyperlipidemia    • Hypertension    • Ischemic cardiomyopathy    • Lightheaded    • Myocardial infarction    • Orthostatic hypotension    • SOB (shortness of breath)    • Stroke        Social History     Socioeconomic History   • Marital status:    Tobacco Use   • Smoking status: Every Day     Current packs/day: 2.00     Average packs/day: 2.0 packs/day for 40.0 years (80.0 ttl pk-yrs)     Types: Electronic Cigarette, Cigarettes   • Smokeless tobacco: Current     Types: Chew   Vaping Use   • Vaping status: Never Used   Substance and Sexual Activity   • Alcohol use: Yes     Comment: occasionally   • Drug use: No   • Sexual activity: Defer       Family History   Problem Relation Age of Onset   • Diabetes Mother    • Hypertension Mother    • Hypertension Father    • Hyperlipidemia Sister         Familial hypercholesterolemia   • Hypertension Sister    • Hyperlipidemia Brother         Familial hypercholesterolemia   • Heart attack Brother         Acute MI   • Stroke Brother         CVA   • Hypertension Brother    • Heart disease Brother         pacemaker placement   • Other Brother         Pacemaker placement       Review of Systems   Constitutional:  Negative for activity change, appetite change, chills, fatigue and fever.   HENT: Negative.  Negative for congestion, sinus pressure and sinus pain.    Eyes: Negative.  Negative for visual disturbance.   Respiratory:  Positive for shortness of breath. Negative for apnea, chest tightness and wheezing.    Cardiovascular: Negative.  Negative for chest pain, palpitations and leg swelling.   Gastrointestinal: Negative.  Negative for blood in stool.   Endocrine: Negative.  Negative for cold intolerance and heat intolerance.   Genitourinary: Negative.  Negative for hematuria.   Musculoskeletal:  Positive for gait problem.   Skin: Negative.  Negative for color change, rash and wound.   Allergic/Immunologic: Negative.  Negative for  "environmental allergies and food allergies.   Neurological:  Positive for weakness. Negative for dizziness, syncope, light-headedness, numbness and headaches.   Hematological:  Bruises/bleeds easily.   Psychiatric/Behavioral: Negative.  Negative for sleep disturbance.        Objective  Vitals:    03/14/24 1541   BP: 119/77   BP Location: Left arm   Patient Position: Sitting   Cuff Size: Adult   Pulse: 69   SpO2: 96%   Weight: 81.6 kg (180 lb)   Height: 175.3 cm (69\")      /77 (BP Location: Left arm, Patient Position: Sitting, Cuff Size: Adult)   Pulse 69   Ht 175.3 cm (69\")   Wt 81.6 kg (180 lb)   SpO2 96%   BMI 26.58 kg/m²     Lab Results (most recent)       None            Physical Exam  Vitals and nursing note reviewed.   Constitutional:       General: He is not in acute distress.     Appearance: Normal appearance. He is well-developed.   HENT:      Head: Normocephalic and atraumatic.   Eyes:      General: No scleral icterus.        Right eye: No discharge.         Left eye: No discharge.      Conjunctiva/sclera: Conjunctivae normal.   Neck:      Vascular: No carotid bruit.   Cardiovascular:      Rate and Rhythm: Normal rate and regular rhythm.      Heart sounds: Normal heart sounds. No murmur heard.     No friction rub. No gallop.   Pulmonary:      Effort: Pulmonary effort is normal. No respiratory distress.      Breath sounds: Normal breath sounds. No wheezing or rales.   Chest:      Chest wall: No tenderness.   Musculoskeletal:      Right lower leg: No edema.      Left lower leg: No edema.   Skin:     General: Skin is warm and dry.      Coloration: Skin is not pale.      Findings: No erythema or rash.   Neurological:      Mental Status: He is alert and oriented to person, place, and time.      Cranial Nerves: No cranial nerve deficit.   Psychiatric:         Behavior: Behavior normal.         Procedure    ECG 12 Lead    Date/Time: 3/14/2024 3:44 PM  Performed by: Joe Neri PA    Authorized " by: Joe Neri PA  Comparison: compared with previous ECG from 5/17/2022  Comments: EKG demonstrates sinus rhythm at 68 bpm, left axis deviation, nonspecific ST at baseline             Assessment & Plan    Problems Addressed this Visit          Cardiac and Vasculature    Coronary artery disease involving native coronary artery of native heart without angina pectoris - Primary (Chronic)    Ischemic cardiomyopathy (Chronic)    Chronic systolic heart failure     Diagnoses         Codes Comments    Coronary artery disease involving native coronary artery of native heart without angina pectoris    -  Primary ICD-10-CM: I25.10  ICD-9-CM: 414.01     Ischemic cardiomyopathy     ICD-10-CM: I25.5  ICD-9-CM: 414.8     Chronic systolic heart failure     ICD-10-CM: I50.22  ICD-9-CM: 428.22               Recommendation  1.  Patient is a 69-year-old male with coronary artery disease that is doing well.  He has no angina no anginal equivalent symptoms.  Patient has a degree of dyspnea that is stable and mild.  For now, we will continue medical therapy.  He has known coronary disease with findings as above.  LAD was diminutive but patent and medical management was recommended at last catheterization.  We will continue the same for now.  I recommend continued statin therapy as patient is on high-dose statin and has labs performed by primary.  We recommend an LDL goal less than 70.    2.  In regards to cardiomyopathy, we would like for him to be on beta-blocker therapy as well as heart failure regimen.  His bottles have been the concern.  Patient with a degree of hypotension at times and even bradycardia.  If his blood pressure will allow in the future, we can try to institute these medications but do not want to institute them and then cause harm to the patient.    3.  Patient with heart failure but euvolemic on medication.  I will make no changes.  We will see him back for follow-up in 6 months or sooner as symptoms  discussed.  Follow with pcp as scheduled.         Darnell Abarca  reports that he has been smoking electronic cigarette and cigarettes. He has a 80 pack-year smoking history. His smokeless tobacco use includes chew. I have educated him on the risk of diseases from using tobacco products such as cancer, COPD, and heart disease.     I advised him to quit and he is not willing to quit.    I spent 3  minutes counseling the patient.          Advance Care Planning  ACP discussion was declined by the patient. Patient does not have an advance directive, declines further assistance.      Electronically signed by:

## 2024-03-14 NOTE — PROGRESS NOTES
Problem list     Subjective   Darnell Abarca is a 69 y.o. male     Chief Complaint   Patient presents with    Follow-up     Problem List:     1. CAD  1.1 CABG x 3 @ [facility] 2003  1.2 [facility] 6/2015 - patient grafts with disease in the native vessels with medical management recommendation; EF 40%  1.3 stress test 12/10/18-apical and inferoapical ischemia, depressed post stress EF 44%  1.4 left heart cath 7/9/19-left main 20%, LAD 60 to 70% stenosis, patent LIMA to distal LAD which also has diffuse distal vessel disease, first obtuse marginal branch 100% occluded with a patent saphenous vein graft to the posterior lateral branches of the left circumflex, right coronary artery 100% occluded the graft to this vessel is also occluded however there is collateral filling to the left circumflex system EF 40%, systolic hypertension  1.5 stress test 5/24/2022-moderate size, dense and incompletely reversible defect involving the distal anterior, anteroapical and apical segments compatible with ischemia ± she had infarct, post-rest EF of 40% with septal hypokinesis.  1.6 left heart cath 6/17/2022-circumflex totally occluded in proximal third, however has relatively extensive collaterals to the distal right coronary artery which have progressed significantly since the last catheterization, the LIMA was a small caliber conduit which is patent throughout, vein graft to circumflex is large and diffusely irregular, 70% or greater stenosis  the more proximal from the grafted obtuse margin, this is unchanged from prior, EF 45%, mild to moderate anterolateral hypokinesis, 1+ regurgitation, LVEDP 24-26  2. Ischemic Cardiomyopathy   2.1 Echo 12/7/2020-EF 50%, mild LVH, diastolic function 1, trivial MR, physiological TR, dilated proximal aortic root 4.4 cm  2.2 Echo 5/24/2022-EF 35 to 40%, moderate LVH, diastolic dysfunction 1, physiological TR, trivial MR, mildly dilated proximal aortic root measuring 4.1 to 4.3 cm  3. CHF; class II-III  symptoms   4. Dyslipidemia   5. Orthostatic hypotension  6. COPD  7. History of throat CA  8. Event Monitor 5/1-5/14/17 - SB - NSR with PVC  9. Carotid Artery Disease   9.1 carotid artery ultrasound 12/7/2020-moderate bifurcation disease on the left with less than or equal to 50% stenosis on the right and 50 to 75% stenosis by echo imaging extending into the proximal left internal carotid artery, 16 to 49% stenosis both internal carotid arteries more prominent on the left, antegrade flow both vertebral arteries; potentially hemodynamically significant stenosis on the left  9.2 CTA of the neck 5/13/2021-scattered calcified atherosclerotic change particular involving the internal carotid arteries right greater than left, no definitive flow-limiting stenosis, paranasal sinus disease suggesting mastoiditis  10. Smoking Habituation   11.  Thoracic aortic aneurysm (NO Aneurysm per most recent CT)  11.1 CT of the chest 1/10/2020 -4 cm ascending aortic aneurysm  11.2 CT of the chest 3/10/2021-normal caliber of a sending thoracic aorta, bilateral lower lobe mild bronchial wall thickening  12.  Chronic renal failure  HPI    Patient is a 69-year-old male who presents back to the office for routine follow-up.  As above, has history of coronary disease and ischemic cardiomyopathy.    He feels well.  He has no chest pain.  His dyspnea is mild.  He does not describe any progressive shortness of breath.  No complaints of PND or orthopnea.    He does not describe palpitations nor does he complain of dysrhythmic symptoms.  He is stable otherwise.      Current Outpatient Medications on File Prior to Visit   Medication Sig Dispense Refill    albuterol (PROVENTIL) (2.5 MG/3ML) 0.083% nebulizer solution Take 2.5 mg by nebulization Every 4 (Four) Hours As Needed for Wheezing. 3 mL 12    atorvastatin (LIPITOR) 80 MG tablet TAKE ONE TABLET BY MOUTH EVERY NIGHT 90 tablet 3    clopidogrel (PLAVIX) 75 MG tablet TAKE ONE TABLET BY MOUTH DAILY  90 tablet 3    Cyanocobalamin 1000 MCG/ML kit Inject 2 mL as directed Every 30 (Thirty) Days.      fludrocortisone 0.1 MG tablet TAKE ONE TABLET EVERY DAY 90 tablet 3    furosemide (LASIX) 20 MG tablet Take 1 tablet by mouth Daily. 90 tablet 3    loratadine (CLARITIN) 10 MG tablet TAKE ONE TABLET EVERY DAY 90 tablet 3    magnesium oxide 250 MG tablet TAKE ONE TABLET BY MOUTH DAILY 30 tablet 0    nitroglycerin (NITROSTAT) 0.4 MG SL tablet Place 1 tablet under the tongue Every 5 (Five) Minutes As Needed for Chest Pain. Take no more than 3 doses in 15 minutes. 25 tablet 3    omeprazole (priLOSEC) 40 MG capsule TAKE ONE CAPSULE BY MOUTH EVERY DAY 90 capsule 3    oxyCODONE-acetaminophen (PERCOCET)  MG per tablet Take 1 tablet by mouth Every 8 (Eight) Hours As Needed for Moderate Pain.      potassium chloride (K-DUR,KLOR-CON) 20 MEQ CR tablet TAKE ONE TABLET BY MOUTH TWICE DAILY 90 tablet 3    promethazine (PHENERGAN) 25 MG tablet Take 1 tablet by mouth Every 6 (Six) Hours As Needed for Nausea or Vomiting.       No current facility-administered medications on file prior to visit.       Nsaids, Aspirin, Codeine, Ethanolamine, Other, Salicylates, Theophylline, and Triprolidine-pseudoephedrine    Past Medical History:   Diagnosis Date    CAD (coronary artery disease)     COPD (chronic obstructive pulmonary disease)     COVID-19 vaccine administered 03/01/2021 03/29/2021 Moderna - Booster ( 11/02/2021) Moderna     Current every day smoker     1.5-2 PPD    Dizziness     Dyslipidemia     Edema     History of throat cancer     Hyperlipidemia     Hypertension     Ischemic cardiomyopathy     Lightheaded     Myocardial infarction     Orthostatic hypotension     SOB (shortness of breath)     Stroke        Social History     Socioeconomic History    Marital status:    Tobacco Use    Smoking status: Every Day     Current packs/day: 2.00     Average packs/day: 2.0 packs/day for 40.0 years (80.0 ttl pk-yrs)     Types:  Electronic Cigarette, Cigarettes    Smokeless tobacco: Current     Types: Chew   Vaping Use    Vaping status: Never Used   Substance and Sexual Activity    Alcohol use: Yes     Comment: occasionally    Drug use: No    Sexual activity: Defer       Family History   Problem Relation Age of Onset    Diabetes Mother     Hypertension Mother     Hypertension Father     Hyperlipidemia Sister         Familial hypercholesterolemia    Hypertension Sister     Hyperlipidemia Brother         Familial hypercholesterolemia    Heart attack Brother         Acute MI    Stroke Brother         CVA    Hypertension Brother     Heart disease Brother         pacemaker placement    Other Brother         Pacemaker placement       Review of Systems   Constitutional:  Negative for activity change, appetite change, chills, fatigue and fever.   HENT: Negative.  Negative for congestion, sinus pressure and sinus pain.    Eyes: Negative.  Negative for visual disturbance.   Respiratory:  Positive for shortness of breath. Negative for apnea, chest tightness and wheezing.    Cardiovascular: Negative.  Negative for chest pain, palpitations and leg swelling.   Gastrointestinal: Negative.  Negative for blood in stool.   Endocrine: Negative.  Negative for cold intolerance and heat intolerance.   Genitourinary: Negative.  Negative for hematuria.   Musculoskeletal:  Positive for gait problem.   Skin: Negative.  Negative for color change, rash and wound.   Allergic/Immunologic: Negative.  Negative for environmental allergies and food allergies.   Neurological:  Positive for weakness. Negative for dizziness, syncope, light-headedness, numbness and headaches.   Hematological:  Bruises/bleeds easily.   Psychiatric/Behavioral: Negative.  Negative for sleep disturbance.        Objective   Vitals:    03/14/24 1541   BP: 119/77   BP Location: Left arm   Patient Position: Sitting   Cuff Size: Adult   Pulse: 69   SpO2: 96%   Weight: 81.6 kg (180 lb)   Height: 175.3 cm  "(69\")      /77 (BP Location: Left arm, Patient Position: Sitting, Cuff Size: Adult)   Pulse 69   Ht 175.3 cm (69\")   Wt 81.6 kg (180 lb)   SpO2 96%   BMI 26.58 kg/m²     Lab Results (most recent)       None            Physical Exam  Vitals and nursing note reviewed.   Constitutional:       General: He is not in acute distress.     Appearance: Normal appearance. He is well-developed.   HENT:      Head: Normocephalic and atraumatic.   Eyes:      General: No scleral icterus.        Right eye: No discharge.         Left eye: No discharge.      Conjunctiva/sclera: Conjunctivae normal.   Neck:      Vascular: No carotid bruit.   Cardiovascular:      Rate and Rhythm: Normal rate and regular rhythm.      Heart sounds: Normal heart sounds. No murmur heard.     No friction rub. No gallop.   Pulmonary:      Effort: Pulmonary effort is normal. No respiratory distress.      Breath sounds: Normal breath sounds. No wheezing or rales.   Chest:      Chest wall: No tenderness.   Musculoskeletal:      Right lower leg: No edema.      Left lower leg: No edema.   Skin:     General: Skin is warm and dry.      Coloration: Skin is not pale.      Findings: No erythema or rash.   Neurological:      Mental Status: He is alert and oriented to person, place, and time.      Cranial Nerves: No cranial nerve deficit.   Psychiatric:         Behavior: Behavior normal.         Procedure     ECG 12 Lead    Date/Time: 3/14/2024 3:44 PM  Performed by: Joe Neri PA    Authorized by: Joe Neri PA  Comparison: compared with previous ECG from 5/17/2022  Comments: EKG demonstrates sinus rhythm at 68 bpm, left axis deviation, nonspecific ST at baseline             Assessment & Plan     Problems Addressed this Visit          Cardiac and Vasculature    Coronary artery disease involving native coronary artery of native heart without angina pectoris - Primary (Chronic)    Ischemic cardiomyopathy (Chronic)    Chronic systolic heart " failure     Diagnoses         Codes Comments    Coronary artery disease involving native coronary artery of native heart without angina pectoris    -  Primary ICD-10-CM: I25.10  ICD-9-CM: 414.01     Ischemic cardiomyopathy     ICD-10-CM: I25.5  ICD-9-CM: 414.8     Chronic systolic heart failure     ICD-10-CM: I50.22  ICD-9-CM: 428.22               Recommendation  1.  Patient is a 69-year-old male with coronary artery disease that is doing well.  He has no angina no anginal equivalent symptoms.  Patient has a degree of dyspnea that is stable and mild.  For now, we will continue medical therapy.  He has known coronary disease with findings as above.  LAD was diminutive but patent and medical management was recommended at last catheterization.  We will continue the same for now.  I recommend continued statin therapy as patient is on high-dose statin and has labs performed by primary.  We recommend an LDL goal less than 70.    2.  In regards to cardiomyopathy, we would like for him to be on beta-blocker therapy as well as heart failure regimen.  His bottles have been the concern.  Patient with a degree of hypotension at times and even bradycardia.  If his blood pressure will allow in the future, we can try to institute these medications but do not want to institute them and then cause harm to the patient.    3.  Patient with heart failure but euvolemic on medication.  I will make no changes.  We will see him back for follow-up in 6 months or sooner as symptoms discussed.  Follow with pcp as scheduled.         Darnell Abarca  reports that he has been smoking electronic cigarette and cigarettes. He has a 80 pack-year smoking history. His smokeless tobacco use includes chew. I have educated him on the risk of diseases from using tobacco products such as cancer, COPD, and heart disease.     I advised him to quit and he is not willing to quit.    I spent 3  minutes counseling the patient.          Advance Care Planning   ACP  discussion was declined by the patient. Patient does not have an advance directive, declines further assistance.      Electronically signed by:

## 2024-05-20 DIAGNOSIS — E83.42 HYPOMAGNESEMIA: ICD-10-CM

## 2024-05-20 RX ORDER — CALCIUM CARBONATE 300MG(750)
0.5 TABLET,CHEWABLE ORAL DAILY
Qty: 15 TABLET | Refills: 0 | Status: SHIPPED | OUTPATIENT
Start: 2024-05-20

## 2024-09-18 ENCOUNTER — OFFICE VISIT (OUTPATIENT)
Dept: CARDIOLOGY | Facility: CLINIC | Age: 69
End: 2024-09-18
Payer: MEDICARE

## 2024-09-18 VITALS
OXYGEN SATURATION: 97 % | SYSTOLIC BLOOD PRESSURE: 142 MMHG | BODY MASS INDEX: 25.77 KG/M2 | HEART RATE: 62 BPM | DIASTOLIC BLOOD PRESSURE: 89 MMHG | WEIGHT: 174 LBS | HEIGHT: 69 IN

## 2024-09-18 DIAGNOSIS — E78.5 HYPERLIPIDEMIA, UNSPECIFIED HYPERLIPIDEMIA TYPE: ICD-10-CM

## 2024-09-18 DIAGNOSIS — I25.10 CORONARY ARTERY DISEASE INVOLVING NATIVE CORONARY ARTERY OF NATIVE HEART WITHOUT ANGINA PECTORIS: Chronic | ICD-10-CM

## 2024-09-18 DIAGNOSIS — I25.5 ISCHEMIC CARDIOMYOPATHY: Chronic | ICD-10-CM

## 2024-09-18 DIAGNOSIS — R07.9 CHEST PAIN, UNSPECIFIED TYPE: Primary | ICD-10-CM

## 2024-09-18 DIAGNOSIS — E87.6 HYPOKALEMIA: ICD-10-CM

## 2024-09-18 DIAGNOSIS — I95.1 ORTHOSTATIC HYPOTENSION: Chronic | ICD-10-CM

## 2024-09-18 PROCEDURE — 99214 OFFICE O/P EST MOD 30 MIN: CPT | Performed by: PHYSICIAN ASSISTANT

## 2024-09-18 PROCEDURE — 3079F DIAST BP 80-89 MM HG: CPT | Performed by: PHYSICIAN ASSISTANT

## 2024-09-18 PROCEDURE — 3077F SYST BP >= 140 MM HG: CPT | Performed by: PHYSICIAN ASSISTANT

## 2024-09-18 RX ORDER — ATORVASTATIN CALCIUM 80 MG/1
80 TABLET, FILM COATED ORAL
Qty: 90 TABLET | Refills: 3 | Status: SHIPPED | OUTPATIENT
Start: 2024-09-18

## 2024-09-18 RX ORDER — FUROSEMIDE 20 MG
20 TABLET ORAL DAILY
Qty: 90 TABLET | Refills: 3 | Status: SHIPPED | OUTPATIENT
Start: 2024-09-18

## 2024-09-18 RX ORDER — CLOPIDOGREL BISULFATE 75 MG/1
75 TABLET ORAL DAILY
Qty: 90 TABLET | Refills: 3 | Status: SHIPPED | OUTPATIENT
Start: 2024-09-18

## 2024-09-18 RX ORDER — POTASSIUM CHLORIDE 1500 MG/1
20 TABLET, EXTENDED RELEASE ORAL 2 TIMES DAILY
Qty: 90 TABLET | Refills: 3 | Status: SHIPPED | OUTPATIENT
Start: 2024-09-18

## 2024-09-18 RX ORDER — FLUDROCORTISONE ACETATE 0.1 MG/1
0.1 TABLET ORAL DAILY
Qty: 90 TABLET | Refills: 3 | Status: SHIPPED | OUTPATIENT
Start: 2024-09-18

## 2025-03-27 ENCOUNTER — OFFICE VISIT (OUTPATIENT)
Dept: CARDIOLOGY | Facility: CLINIC | Age: 70
End: 2025-03-27
Payer: MEDICARE

## 2025-03-27 VITALS
WEIGHT: 174 LBS | HEART RATE: 62 BPM | SYSTOLIC BLOOD PRESSURE: 145 MMHG | OXYGEN SATURATION: 98 % | DIASTOLIC BLOOD PRESSURE: 88 MMHG | BODY MASS INDEX: 25.77 KG/M2 | HEIGHT: 69 IN

## 2025-03-27 DIAGNOSIS — I25.10 CORONARY ARTERY DISEASE INVOLVING NATIVE CORONARY ARTERY OF NATIVE HEART WITHOUT ANGINA PECTORIS: Primary | ICD-10-CM

## 2025-03-27 DIAGNOSIS — I25.5 ISCHEMIC CARDIOMYOPATHY: ICD-10-CM

## 2025-03-27 DIAGNOSIS — I50.22 CHRONIC SYSTOLIC HEART FAILURE: ICD-10-CM

## 2025-03-27 PROCEDURE — 3079F DIAST BP 80-89 MM HG: CPT | Performed by: PHYSICIAN ASSISTANT

## 2025-03-27 PROCEDURE — 99214 OFFICE O/P EST MOD 30 MIN: CPT | Performed by: PHYSICIAN ASSISTANT

## 2025-03-27 PROCEDURE — 3077F SYST BP >= 140 MM HG: CPT | Performed by: PHYSICIAN ASSISTANT

## 2025-03-27 NOTE — LETTER
March 31, 2025     EARLINE Hogan  [address]  [address]  [place] [address]    Patient: Darnell Abarca   YOB: 1955   Date of Visit: 3/27/2025       Dear EARLINE Hogan    Darnell Abarca was in my office today. Below is a copy of my note.    If you have questions, please do not hesitate to call me. I look forward to following Darnell along with you.         Sincerely,        MIGEL Fish        CC: No Recipients    Problem list     Subjective  Darnell Abarca is a 70 y.o. male     Chief Complaint   Patient presents with   • Follow-up     CAD   Problem List:     1. CAD  1.1 CABG x 3 @ [facility] 2003  1.2 several cardiac catheterizations since that time  1.3 left heart cath 6/17/2022-circumflex totally occluded in proximal third, however has relatively extensive collaterals to the distal right coronary artery which have progressed significantly since the last catheterization, the LIMA was a small caliber conduit which is patent throughout, vein graft to circumflex is large and diffusely irregular, 70% or greater stenosis  the more proximal from the grafted obtuse margin, this is unchanged from prior, EF 45%, mild to moderate anterolateral hypokinesis, 1+ regurgitation, LVEDP 24-26  1.4 nuclear stress test May 2022 with a moderate size defect involving the distal anterior, anteroapical and apical segment compatible with ischemia with possible infarct.  EF estimated at 40%   2. Ischemic Cardiomyopathy   2.1 Echo 12/7/2020-EF 50%, mild LVH, diastolic function 1, trivial MR, physiological TR, dilated proximal aortic root 4.4 cm  2.2 Echo 5/24/2022-EF 35 to 40%, moderate LVH, diastolic dysfunction 1, physiological TR, trivial MR, mildly dilated proximal aortic root measuring 4.1 to 4.3 cm  3. CHF; class II-III symptoms   4. Dyslipidemia   5. Orthostatic hypotension  6. COPD  7. History of throat CA  8. Event Monitor 5/1-5/14/17 - SB - NSR with PVC  9. Carotid Artery Disease   9.1 CTA  of the neck 5/13/2021-scattered calcified atherosclerotic change particular involving the internal carotid arteries right greater than left, no definitive flow-limiting stenosis, paranasal sinus disease suggesting mastoiditis  10. Smoking Habituation   11.  Thoracic aortic aneurysm (NO Aneurysm per most recent CT)  11.1 CT of the chest 1/10/2020 -4 cm ascending aortic aneurysm  11.2 CT of the chest 3/10/2021-normal caliber of a sending thoracic aorta, bilateral lower lobe mild bronchial wall thickening  12.  Chronic renal failure    HPI    Patient is a 70-year-old male presenting back to the office for follow-up.  Patient has above medical history underwent he has underwent bypass grafting and history of cancer as well.    Clinically, he feels stable.  He does not complain of chest pain.  He does have a degree of mild dyspnea that seems at baseline.  He does not describe any progressive or severe exertional dyspnea.  No complaints of PND orthopnea.    He does not describe palpitating nor does he complain of dysrhythmic symptoms.  He is stable otherwise.      Current Outpatient Medications on File Prior to Visit   Medication Sig Dispense Refill   • albuterol (PROVENTIL) (2.5 MG/3ML) 0.083% nebulizer solution Take 2.5 mg by nebulization Every 4 (Four) Hours As Needed for Wheezing. 3 mL 12   • atorvastatin (LIPITOR) 80 MG tablet Take 1 tablet by mouth every night at bedtime. 90 tablet 3   • clopidogrel (PLAVIX) 75 MG tablet Take 1 tablet by mouth Daily. 90 tablet 3   • Cyanocobalamin 1000 MCG/ML kit Inject 2 mL as directed Every 30 (Thirty) Days.     • fludrocortisone 0.1 MG tablet Take 1 tablet by mouth Daily. 90 tablet 3   • furosemide (LASIX) 20 MG tablet Take 1 tablet by mouth Daily. 90 tablet 3   • loratadine (CLARITIN) 10 MG tablet TAKE ONE TABLET EVERY DAY 90 tablet 3   • Magnesium 400 MG tablet take ONE-HALF tablet BY MOUTH DAILY 15 tablet 0   • nitroglycerin (NITROSTAT) 0.4 MG SL tablet Place 1 tablet under  the tongue Every 5 (Five) Minutes As Needed for Chest Pain. Take no more than 3 doses in 15 minutes. 25 tablet 3   • omeprazole (priLOSEC) 40 MG capsule TAKE ONE CAPSULE BY MOUTH EVERY DAY 90 capsule 3   • oxyCODONE-acetaminophen (PERCOCET)  MG per tablet Take 1 tablet by mouth Every 8 (Eight) Hours As Needed for Moderate Pain.     • potassium chloride (KLOR-CON M20) 20 MEQ CR tablet Take 1 tablet by mouth 2 (Two) Times a Day. 90 tablet 3   • promethazine (PHENERGAN) 25 MG tablet Take 1 tablet by mouth Every 6 (Six) Hours As Needed for Nausea or Vomiting.       No current facility-administered medications on file prior to visit.       Nsaids, Aspirin, Codeine, Ethanolamine, Other, Salicylates, Theophylline, and Triprolidine-pseudoephedrine    Past Medical History:   Diagnosis Date   • CAD (coronary artery disease)    • COPD (chronic obstructive pulmonary disease)    • COVID-19 vaccine administered 03/01/2021 03/29/2021 Moderna - Booster ( 11/02/2021) Moderna    • Current every day smoker     1.5-2 PPD   • Dizziness    • Dyslipidemia    • Edema    • History of throat cancer    • Hyperlipidemia    • Hypertension    • Ischemic cardiomyopathy    • Lightheaded    • Myocardial infarction    • Orthostatic hypotension    • SOB (shortness of breath)    • Stroke        Social History     Socioeconomic History   • Marital status:    Tobacco Use   • Smoking status: Every Day     Current packs/day: 2.00     Average packs/day: 2.0 packs/day for 40.0 years (80.0 ttl pk-yrs)     Types: Electronic Cigarette, Cigarettes   • Smokeless tobacco: Current     Types: Chew   Vaping Use   • Vaping status: Never Used   Substance and Sexual Activity   • Alcohol use: Yes     Comment: occasionally   • Drug use: No   • Sexual activity: Defer       Family History   Problem Relation Age of Onset   • Diabetes Mother    • Hypertension Mother    • Hypertension Father    • Hyperlipidemia Sister         Familial hypercholesterolemia   •  "Hypertension Sister    • Hyperlipidemia Brother         Familial hypercholesterolemia   • Heart attack Brother         Acute MI   • Stroke Brother         CVA   • Hypertension Brother    • Heart disease Brother         pacemaker placement   • Other Brother         Pacemaker placement       Review of Systems   Constitutional: Negative.  Negative for activity change, appetite change, chills, fatigue and fever.   HENT: Negative.  Negative for congestion, sinus pressure and sinus pain.    Eyes: Negative.  Negative for visual disturbance.   Respiratory:  Positive for shortness of breath. Negative for apnea, cough, chest tightness and wheezing.    Cardiovascular: Negative.  Negative for chest pain, palpitations and leg swelling.   Gastrointestinal: Negative.  Negative for blood in stool.   Endocrine: Negative.  Negative for cold intolerance and heat intolerance.   Genitourinary: Negative.  Negative for hematuria.   Musculoskeletal: Negative.  Negative for gait problem.   Skin: Negative.  Negative for color change, rash and wound.   Allergic/Immunologic: Negative for environmental allergies and food allergies.   Neurological:  Negative for dizziness, syncope, weakness, light-headedness, numbness and headaches.   Hematological: Negative.  Does not bruise/bleed easily.   Psychiatric/Behavioral: Negative.  Negative for sleep disturbance.        Objective  Vitals:    03/27/25 1309   BP: 145/88   BP Location: Right arm   Patient Position: Sitting   Cuff Size: Adult   Pulse: 62   SpO2: 98%   Weight: 78.9 kg (174 lb)   Height: 175.3 cm (69\")      /88 (BP Location: Right arm, Patient Position: Sitting, Cuff Size: Adult)   Pulse 62   Ht 175.3 cm (69\")   Wt 78.9 kg (174 lb)   SpO2 98%   BMI 25.70 kg/m²     Lab Results (most recent)       None            Physical Exam  Vitals and nursing note reviewed.   Constitutional:       General: He is not in acute distress.     Appearance: Normal appearance. He is well-developed. "   HENT:      Head: Normocephalic and atraumatic.   Eyes:      General: No scleral icterus.        Right eye: No discharge.         Left eye: No discharge.      Conjunctiva/sclera: Conjunctivae normal.   Neck:      Vascular: No carotid bruit.   Cardiovascular:      Rate and Rhythm: Normal rate and regular rhythm.      Heart sounds: Normal heart sounds. No murmur heard.     No friction rub. No gallop.   Pulmonary:      Effort: Pulmonary effort is normal. No respiratory distress.      Breath sounds: Normal breath sounds. No wheezing or rales.   Chest:      Chest wall: No tenderness.   Musculoskeletal:      Right lower leg: No edema.      Left lower leg: No edema.   Skin:     General: Skin is warm and dry.      Coloration: Skin is not pale.      Findings: No erythema or rash.   Neurological:      Mental Status: He is alert and oriented to person, place, and time.      Cranial Nerves: No cranial nerve deficit.   Psychiatric:         Behavior: Behavior normal.         Procedure  Procedures       Assessment & Plan    Problems Addressed this Visit          Cardiac and Vasculature    Coronary artery disease involving native coronary artery of native heart without angina pectoris - Primary (Chronic)    Relevant Medications    sacubitril-valsartan (ENTRESTO) 24-26 MG tablet    Ischemic cardiomyopathy (Chronic)    Relevant Medications    sacubitril-valsartan (ENTRESTO) 24-26 MG tablet    Chronic systolic heart failure    Relevant Medications    sacubitril-valsartan (ENTRESTO) 24-26 MG tablet     Diagnoses         Codes Comments      Coronary artery disease involving native coronary artery of native heart without angina pectoris    -  Primary ICD-10-CM: I25.10  ICD-9-CM: 414.01       Ischemic cardiomyopathy     ICD-10-CM: I25.5  ICD-9-CM: 414.8       Chronic systolic heart failure     ICD-10-CM: I50.22  ICD-9-CM: 428.22             Recommendations  1.  Patient is a 70-year-old male presenting back to the office for routine  assessment with known coronary disease that is doing well without angina.  He feels stable at this time.  He is on antiplatelet therapy as well as statin therapy at high dose.  We recommended LDL goal less than 70 and smoking cessation.  For now, he is stable and we can monitor.  Patient had abnormal stress test 3 years ago but continues to be without symptoms of significance.  We can monitor for now.  2.  Patient with ischemic cardiomyopathy.  Because of heart rate, we are  we will Midodrine regards to beta-blocker therapy.  I would like to try low-dose Entresto  for guideline directed medical therapy.  I would like for him to call us back in 1 week to see if he has any improvement.    3.  Patient appears euvolemic by exam today in regards to her congestive heart failure.  I will make no changes.    4.  We will see patient back for follow-up in 6 months or sooner if needed.  Follow-up with primary as scheduled.           Darnell Abarca  reports that he has been smoking electronic cigarette and cigarettes. He has a 80 pack-year smoking history. His smokeless tobacco use includes chew. I have educated him on the risk of diseases from using tobacco products such as cancer, COPD, and heart disease.     I advised him to quit and he is not willing to quit.    I spent 3  minutes counseling the patient.          Patient did not bring med list or medicine bottles to appointment, med list has been reviewed and updated based on patient's knowledge of their meds.      Advance Care Planning  ACP discussion was declined by the patient. Patient has an advance directive (not in EMR), copy requested.        Electronically signed by:

## 2025-03-27 NOTE — PROGRESS NOTES
Problem list     Subjective   Darnell Abarca is a 70 y.o. male     Chief Complaint   Patient presents with    Follow-up     CAD   Problem List:     1. CAD  1.1 CABG x 3 @ [facility] 2003  1.2 several cardiac catheterizations since that time  1.3 left heart cath 6/17/2022-circumflex totally occluded in proximal third, however has relatively extensive collaterals to the distal right coronary artery which have progressed significantly since the last catheterization, the LIMA was a small caliber conduit which is patent throughout, vein graft to circumflex is large and diffusely irregular, 70% or greater stenosis  the more proximal from the grafted obtuse margin, this is unchanged from prior, EF 45%, mild to moderate anterolateral hypokinesis, 1+ regurgitation, LVEDP 24-26  1.4 nuclear stress test May 2022 with a moderate size defect involving the distal anterior, anteroapical and apical segment compatible with ischemia with possible infarct.  EF estimated at 40%   2. Ischemic Cardiomyopathy   2.1 Echo 12/7/2020-EF 50%, mild LVH, diastolic function 1, trivial MR, physiological TR, dilated proximal aortic root 4.4 cm  2.2 Echo 5/24/2022-EF 35 to 40%, moderate LVH, diastolic dysfunction 1, physiological TR, trivial MR, mildly dilated proximal aortic root measuring 4.1 to 4.3 cm  3. CHF; class II-III symptoms   4. Dyslipidemia   5. Orthostatic hypotension  6. COPD  7. History of throat CA  8. Event Monitor 5/1-5/14/17 - SB - NSR with PVC  9. Carotid Artery Disease   9.1 CTA of the neck 5/13/2021-scattered calcified atherosclerotic change particular involving the internal carotid arteries right greater than left, no definitive flow-limiting stenosis, paranasal sinus disease suggesting mastoiditis  10. Smoking Habituation   11.  Thoracic aortic aneurysm (NO Aneurysm per most recent CT)  11.1 CT of the chest 1/10/2020 -4 cm ascending aortic aneurysm  11.2 CT of the chest 3/10/2021-normal caliber of a sending thoracic aorta,  bilateral lower lobe mild bronchial wall thickening  12.  Chronic renal failure    HPI    Patient is a 70-year-old male presenting back to the office for follow-up.  Patient has above medical history underwent he has underwent bypass grafting and history of cancer as well.    Clinically, he feels stable.  He does not complain of chest pain.  He does have a degree of mild dyspnea that seems at baseline.  He does not describe any progressive or severe exertional dyspnea.  No complaints of PND orthopnea.    He does not describe palpitating nor does he complain of dysrhythmic symptoms.  He is stable otherwise.      Current Outpatient Medications on File Prior to Visit   Medication Sig Dispense Refill    albuterol (PROVENTIL) (2.5 MG/3ML) 0.083% nebulizer solution Take 2.5 mg by nebulization Every 4 (Four) Hours As Needed for Wheezing. 3 mL 12    atorvastatin (LIPITOR) 80 MG tablet Take 1 tablet by mouth every night at bedtime. 90 tablet 3    clopidogrel (PLAVIX) 75 MG tablet Take 1 tablet by mouth Daily. 90 tablet 3    Cyanocobalamin 1000 MCG/ML kit Inject 2 mL as directed Every 30 (Thirty) Days.      fludrocortisone 0.1 MG tablet Take 1 tablet by mouth Daily. 90 tablet 3    furosemide (LASIX) 20 MG tablet Take 1 tablet by mouth Daily. 90 tablet 3    loratadine (CLARITIN) 10 MG tablet TAKE ONE TABLET EVERY DAY 90 tablet 3    Magnesium 400 MG tablet take ONE-HALF tablet BY MOUTH DAILY 15 tablet 0    nitroglycerin (NITROSTAT) 0.4 MG SL tablet Place 1 tablet under the tongue Every 5 (Five) Minutes As Needed for Chest Pain. Take no more than 3 doses in 15 minutes. 25 tablet 3    omeprazole (priLOSEC) 40 MG capsule TAKE ONE CAPSULE BY MOUTH EVERY DAY 90 capsule 3    oxyCODONE-acetaminophen (PERCOCET)  MG per tablet Take 1 tablet by mouth Every 8 (Eight) Hours As Needed for Moderate Pain.      potassium chloride (KLOR-CON M20) 20 MEQ CR tablet Take 1 tablet by mouth 2 (Two) Times a Day. 90 tablet 3    promethazine  (PHENERGAN) 25 MG tablet Take 1 tablet by mouth Every 6 (Six) Hours As Needed for Nausea or Vomiting.       No current facility-administered medications on file prior to visit.       Nsaids, Aspirin, Codeine, Ethanolamine, Other, Salicylates, Theophylline, and Triprolidine-pseudoephedrine    Past Medical History:   Diagnosis Date    CAD (coronary artery disease)     COPD (chronic obstructive pulmonary disease)     COVID-19 vaccine administered 03/01/2021 03/29/2021 Moderna - Booster ( 11/02/2021) Moderna     Current every day smoker     1.5-2 PPD    Dizziness     Dyslipidemia     Edema     History of throat cancer     Hyperlipidemia     Hypertension     Ischemic cardiomyopathy     Lightheaded     Myocardial infarction     Orthostatic hypotension     SOB (shortness of breath)     Stroke        Social History     Socioeconomic History    Marital status:    Tobacco Use    Smoking status: Every Day     Current packs/day: 2.00     Average packs/day: 2.0 packs/day for 40.0 years (80.0 ttl pk-yrs)     Types: Electronic Cigarette, Cigarettes    Smokeless tobacco: Current     Types: Chew   Vaping Use    Vaping status: Never Used   Substance and Sexual Activity    Alcohol use: Yes     Comment: occasionally    Drug use: No    Sexual activity: Defer       Family History   Problem Relation Age of Onset    Diabetes Mother     Hypertension Mother     Hypertension Father     Hyperlipidemia Sister         Familial hypercholesterolemia    Hypertension Sister     Hyperlipidemia Brother         Familial hypercholesterolemia    Heart attack Brother         Acute MI    Stroke Brother         CVA    Hypertension Brother     Heart disease Brother         pacemaker placement    Other Brother         Pacemaker placement       Review of Systems   Constitutional: Negative.  Negative for activity change, appetite change, chills, fatigue and fever.   HENT: Negative.  Negative for congestion, sinus pressure and sinus pain.    Eyes:  "Negative.  Negative for visual disturbance.   Respiratory:  Positive for shortness of breath. Negative for apnea, cough, chest tightness and wheezing.    Cardiovascular: Negative.  Negative for chest pain, palpitations and leg swelling.   Gastrointestinal: Negative.  Negative for blood in stool.   Endocrine: Negative.  Negative for cold intolerance and heat intolerance.   Genitourinary: Negative.  Negative for hematuria.   Musculoskeletal: Negative.  Negative for gait problem.   Skin: Negative.  Negative for color change, rash and wound.   Allergic/Immunologic: Negative for environmental allergies and food allergies.   Neurological:  Negative for dizziness, syncope, weakness, light-headedness, numbness and headaches.   Hematological: Negative.  Does not bruise/bleed easily.   Psychiatric/Behavioral: Negative.  Negative for sleep disturbance.        Objective   Vitals:    03/27/25 1309   BP: 145/88   BP Location: Right arm   Patient Position: Sitting   Cuff Size: Adult   Pulse: 62   SpO2: 98%   Weight: 78.9 kg (174 lb)   Height: 175.3 cm (69\")      /88 (BP Location: Right arm, Patient Position: Sitting, Cuff Size: Adult)   Pulse 62   Ht 175.3 cm (69\")   Wt 78.9 kg (174 lb)   SpO2 98%   BMI 25.70 kg/m²     Lab Results (most recent)       None            Physical Exam  Vitals and nursing note reviewed.   Constitutional:       General: He is not in acute distress.     Appearance: Normal appearance. He is well-developed.   HENT:      Head: Normocephalic and atraumatic.   Eyes:      General: No scleral icterus.        Right eye: No discharge.         Left eye: No discharge.      Conjunctiva/sclera: Conjunctivae normal.   Neck:      Vascular: No carotid bruit.   Cardiovascular:      Rate and Rhythm: Normal rate and regular rhythm.      Heart sounds: Normal heart sounds. No murmur heard.     No friction rub. No gallop.   Pulmonary:      Effort: Pulmonary effort is normal. No respiratory distress.      Breath " sounds: Normal breath sounds. No wheezing or rales.   Chest:      Chest wall: No tenderness.   Musculoskeletal:      Right lower leg: No edema.      Left lower leg: No edema.   Skin:     General: Skin is warm and dry.      Coloration: Skin is not pale.      Findings: No erythema or rash.   Neurological:      Mental Status: He is alert and oriented to person, place, and time.      Cranial Nerves: No cranial nerve deficit.   Psychiatric:         Behavior: Behavior normal.         Procedure   Procedures       Assessment & Plan     Problems Addressed this Visit          Cardiac and Vasculature    Coronary artery disease involving native coronary artery of native heart without angina pectoris - Primary (Chronic)    Relevant Medications    sacubitril-valsartan (ENTRESTO) 24-26 MG tablet    Ischemic cardiomyopathy (Chronic)    Relevant Medications    sacubitril-valsartan (ENTRESTO) 24-26 MG tablet    Chronic systolic heart failure    Relevant Medications    sacubitril-valsartan (ENTRESTO) 24-26 MG tablet     Diagnoses         Codes Comments      Coronary artery disease involving native coronary artery of native heart without angina pectoris    -  Primary ICD-10-CM: I25.10  ICD-9-CM: 414.01       Ischemic cardiomyopathy     ICD-10-CM: I25.5  ICD-9-CM: 414.8       Chronic systolic heart failure     ICD-10-CM: I50.22  ICD-9-CM: 428.22             Recommendations  1.  Patient is a 70-year-old male presenting back to the office for routine assessment with known coronary disease that is doing well without angina.  He feels stable at this time.  He is on antiplatelet therapy as well as statin therapy at high dose.  We recommended LDL goal less than 70 and smoking cessation.  For now, he is stable and we can monitor.  Patient had abnormal stress test 3 years ago but continues to be without symptoms of significance.  We can monitor for now.  2.  Patient with ischemic cardiomyopathy.  Because of heart rate, we are  we will Midodrine  regards to beta-blocker therapy.  I would like to try low-dose Entresto  for guideline directed medical therapy.  I would like for him to call us back in 1 week to see if he has any improvement.    3.  Patient appears euvolemic by exam today in regards to her congestive heart failure.  I will make no changes.    4.  We will see patient back for follow-up in 6 months or sooner if needed.  Follow-up with primary as scheduled.           Darnell Abarca  reports that he has been smoking electronic cigarette and cigarettes. He has a 80 pack-year smoking history. His smokeless tobacco use includes chew. I have educated him on the risk of diseases from using tobacco products such as cancer, COPD, and heart disease.     I advised him to quit and he is not willing to quit.    I spent 3  minutes counseling the patient.          Patient did not bring med list or medicine bottles to appointment, med list has been reviewed and updated based on patient's knowledge of their meds.      Advance Care Planning   ACP discussion was declined by the patient. Patient has an advance directive (not in EMR), copy requested.        Electronically signed by: